# Patient Record
Sex: FEMALE | Race: BLACK OR AFRICAN AMERICAN | Employment: FULL TIME | URBAN - METROPOLITAN AREA
[De-identification: names, ages, dates, MRNs, and addresses within clinical notes are randomized per-mention and may not be internally consistent; named-entity substitution may affect disease eponyms.]

---

## 2017-06-28 ENCOUNTER — TELEPHONE (OUTPATIENT)
Dept: ORTHOPEDIC SURGERY | Age: 57
End: 2017-06-28

## 2017-06-28 ENCOUNTER — OFFICE VISIT (OUTPATIENT)
Dept: ORTHOPEDIC SURGERY | Age: 57
End: 2017-06-28

## 2017-06-28 VITALS
WEIGHT: 219.6 LBS | RESPIRATION RATE: 14 BRPM | HEIGHT: 66 IN | HEART RATE: 85 BPM | SYSTOLIC BLOOD PRESSURE: 140 MMHG | BODY MASS INDEX: 35.29 KG/M2 | DIASTOLIC BLOOD PRESSURE: 93 MMHG

## 2017-06-28 DIAGNOSIS — M54.16 LUMBAR NEURITIS: ICD-10-CM

## 2017-06-28 DIAGNOSIS — M48.061 LUMBAR SPINAL STENOSIS: Primary | ICD-10-CM

## 2017-06-28 DIAGNOSIS — M51.36 OTHER INTERVERTEBRAL DISC DEGENERATION, LUMBAR REGION: ICD-10-CM

## 2017-06-28 RX ORDER — SUMATRIPTAN 6 MG/.5ML
6 INJECTION, SOLUTION SUBCUTANEOUS AS NEEDED
COMMUNITY

## 2017-06-28 RX ORDER — DOCUSATE SODIUM 100 MG/1
100 CAPSULE, LIQUID FILLED ORAL 2 TIMES DAILY
COMMUNITY

## 2017-06-28 RX ORDER — ADAPALENE 45 G/G
GEL TOPICAL
COMMUNITY

## 2017-06-28 RX ORDER — HYDROCODONE BITARTRATE AND ACETAMINOPHEN 10; 325 MG/1; MG/1
1 TABLET ORAL
COMMUNITY
End: 2017-07-21

## 2017-06-28 RX ORDER — GABAPENTIN 300 MG/1
300 CAPSULE ORAL 3 TIMES DAILY
COMMUNITY
End: 2017-07-21

## 2017-06-28 NOTE — PROGRESS NOTES
[facility]  [address]  [address], [address]  Phone: [phone]  Fax: [phone]        INITIAL CONSULTATION      HISTORY OF PRESENT ILLNESS:  Candis Wright is a 49 y.o. female whom is self-referred secondary to lumbar/buttocks pain x 2 years with more recent radicular symptoms into the BLE (R>L) extending to the calf in the RLE and to the knee in the LLE since 4/2017. Pt reports a weakness sensation of the RLE and feels she often has to drag her RLE. No specific injury/trauma. She rates pain 6/10. Pt is a pharmacist. Pt denies h/o lumbar spinal surgery. She underwent lumbar blocks x 2 within the past year without relief as administered by Dr. Camila Ibanez at [address] [place] [address]. Pt received chiropractic treatment in the past without relief. She recently attended physical therapy which provides good relief. Pt completes her HEP regularly. Pt is followed by Dr. Suyapa Melo, neurologist in  [place], who follows her for narcolepsy, insomnia and migraines. He has essentially taken over her chronic pain management. She is currently taking Neurontin 300 mg 2 po qam and 2 po qhs. Pt took Medrol Dosepak in 5/2017 and reports completing two courses of oral steroids prior to that with significant relief. Pt denies change in bowel or bladder habits. Pt denies fever or skin changes. She reports intentional weight-loss. Lumbar spine MRI report dated 8/13/15 was reviewed with the patient. Films were not available for my review. Per report, there was multilevel disc and facet degenerative change with congenital narrowing of the lumbar canal and minimal spondylolisthesis. Severe central canal stenosis and mild right foraminal stenosis at L4-5. Mild central canal stenoses L3-4 and L5-S1. Moderate to severe bilateral foraminal stenoses at L5-S1. Pt is accompanied by her  today.  reviewed.        Past Medical History:   Diagnosis Date    Arthritis     b/l knees, hands,shoulders and feet    Chronic pain     headaches and knees    Depression     Headache     Ill-defined condition     Migraines    Other unknown and unspecified cause of morbidity or mortality     Fibromyalgia per patient          Past Surgical History:   Procedure Laterality Date    HX  SECTION      HX HEENT  1986    [place] Teeth removal    HX MOHS PROCEDURES  May 2009    right    HX ORTHOPAEDIC Right 2017    Torn meniscus repair    HX ORTHOPAEDIC Right 2014    right shoulder surgery         Social History   Substance Use Topics    Smoking status: Never Smoker    Smokeless tobacco: Never Used    Alcohol use No     Work status: The patient is employed. Marital status: The patient is . Current Outpatient Prescriptions   Medication Sig Dispense Refill    SUMAtriptan (IMITREX) 6 mg/0.5 mL injection 6 mg by SubCUTAneous route once.  gabapentin (NEURONTIN) 300 mg capsule Take 300 mg by mouth three (3) times daily.  HYDROcodone-acetaminophen (NORCO)  mg tablet Take 1 Tab by mouth.  docusate sodium (COLACE) 100 mg capsule Take 100 mg by mouth two (2) times a day.  adapalene (DIFFERIN) 0.1 % topical gel Apply  to affected area nightly. use small amount as directed      methylphenidate (RITALIN) 10 mg tablet Take 10 mg by mouth.  buPROPion XL (WELLBUTRIN XL) 300 mg XL tablet Take 300 mg by mouth every morning.  alprazolam (XANAX) 1 mg tablet Take 1 mg by mouth nightly as needed.  valacyclovir (VALTREX) 500 mg tablet Take  by mouth two (2) times a day.  naproxen (NAPROSYN) 500 mg tablet Take 500 mg by mouth two (2) times daily (with meals).  cyclobenzaprine (FLEXERIL) 10 mg tablet Take  by mouth three (3) times daily as needed.  naratriptan (AMERGE) 2.5 mg tablet Take 2.5 mg by mouth once as needed.  2.5 mg at onset of headache, may repeat in 4 hours if needed       ondansetron (ZOFRAN ODT) 8 mg disintegrating tablet Take 8 mg by mouth every eight (8) hours as needed.  simvastatin (ZOCOR) 40 mg tablet Take 1 Tab by mouth nightly. 30 Tab 2    temazepam (RESTORIL) 30 mg capsule Take  by mouth nightly as needed.  omega-3 fatty acids-vitamin e (FISH OIL) 1,000 mg Cap Take 1 Cap by mouth. Allergies   Allergen Reactions    Pcn [Penicillins] Hives, Shortness of Breath and Swelling     itch          History reviewed. No pertinent family history. REVIEW OF SYSTEMS  Constitutional symptoms: Negative  Eyes: Negative  Ears, Nose, Throat, and Mouth: Negative  Cardiovascular: Negative  Respiratory: Negative  Genitourinary: Negative  Integumentary (Skin and/or breast): Negative  Musculoskeletal: Positive for low back/buttock pain into the BLE. Extremities: Negative for edema. Endocrine/Rheumatologic: Negative  Hematologic/Lymphatic: Negative  Allergic/Immunologic: Negative  Psychiatric: Negative       PHYSICAL EXAMINATION    Visit Vitals    BP (!) 140/93    Pulse 85    Resp 14    Ht 5' 6\" (1.676 m)    Wt 219 lb 9.6 oz (99.6 kg)    BMI 35.44 kg/m2       CONSTITUTIONAL: NAD, A&O x 3  HEART: Regular rate and rhythm  ABDOMEN: Positive bowel sounds, soft, nontender, and nondistended  LUNGS: Clear to auscultation bilaterally. SKIN: No rashes noted. RANGE OF MOTION: The patient has full passive range of motion in all four extremities. SENSATION: Sensation is intact to light touch throughout. MOTOR:   Straight Leg Raise: Negative, bilateral  Hoskins: Negative, bilateral  Deep tendon reflexes are 2+ at the brachioradialis, biceps, and triceps. Deep tendon reflexes are 0 at the knees and ankles bilaterally.      Shoulder AB/Flex Elbow Flex Wrist Ext Elbow Ext Wrist Flex Hand Intrin Tone   Right +4/5 +4/5 +4/5 +4/5 +4/5 +4/5 +4/5   Left +4/5 +4/5 +4/5 +4/5 +4/5 +4/5 +4/5              Hip Flex Knee Ext Knee Flex Ankle DF GTE Ankle PF Tone   Right +4/5 +4/5 +4/5 +4/5 +4/5 +4/5 +4/5   Left +4/5 +4/5 +4/5 +4/5 +4/5 +4/5 +4/5 ASSESSMENT   Lon Teran was seen today for back pain, buttocks pain, leg pain and new patient. Diagnoses and all orders for this visit:    Lumbar spinal stenosis  -     MRI LUMB SPINE WO CONT; Future    Lumbar neuritis  -     MRI LUMB SPINE WO CONT; Future    Other intervertebral disc degeneration, lumbar region  -     MRI LUMB SPINE WO CONT; Future           IMPRESSIONS/RECOMMENDATIONS:  The patient presents for low back and buttocks pain with radicular symptoms into the BLE (R>L). I will set her up for a lumbar spine MRI. I advised patient to bring copies of films to next visit. I will have patient sign a release of medical records so I can get her block notes from Dr. Catherine Ochoa at [address] [place] [address]. I recommend she discuss with Dr. Enrique Briseno to possibly increase her Neurontin dose to 600 mg TID if tolerated. I will see the patient back following MRI. Written by Dilip Segundo, as dictated by Angelika Bran MD  I examined the patient, reviewed and agree with the note.

## 2017-06-28 NOTE — MR AVS SNAPSHOT
Visit Information Date & Time Provider Department Dept. Phone Encounter #  
 6/28/2017  3:05 PM Nakia Moctezuma MD [facility] [place] [phone] [id number] Follow-up Instructions Return for following MRI. Upcoming Health Maintenance Date Due Hepatitis C Screening 1960 DTaP/Tdap/Td series (1 - Tdap) 7/29/1983 PAP AKA CERVICAL CYTOLOGY 7/29/1983 FOBT Q 1 YEAR AGE 50-75 7/29/2012 BREAST CANCER SCRN MAMMOGRAM 8/16/2012 INFLUENZA AGE 9 TO ADULT 8/1/2017 Allergies as of 6/28/2017  Review Complete On: 6/28/2017 By: Nakia Moctezuma MD  
  
 Severity Noted Reaction Type Reactions Pcn [Penicillins]  07/27/2011    Hives, Shortness of Breath, Swelling  
 itch Current Immunizations  Never Reviewed No immunizations on file. Not reviewed this visit You Were Diagnosed With   
  
 Codes Comments Lumbar spinal stenosis    -  Primary ICD-10-CM: M48.06 
ICD-9-CM: 724.02 Lumbar neuritis     ICD-10-CM: M54.16 
ICD-9-CM: 724.4 Other intervertebral disc degeneration, lumbar region     ICD-10-CM: M51.36 
ICD-9-CM: 722.52 Vitals BP Pulse Resp Height(growth percentile) Weight(growth percentile) BMI  
 (!) 140/93 85 14 5' 6\" (1.676 m) 219 lb 9.6 oz (99.6 kg) 35.44 kg/m2 OB Status Smoking Status Having regular periods Never Smoker Vitals History BMI and BSA Data Body Mass Index Body Surface Area  
 35.44 kg/m 2 2.15 m 2 Preferred Pharmacy Pharmacy Name Phone [id number] [place], [address] [facility] 1 [phone] Your Updated Medication List  
  
   
This list is accurate as of: 6/28/17  4:43 PM.  Always use your most recent med list.  
  
  
  
  
 AMERGE 2.5 mg Tab Generic drug:  naratriptan Take 2.5 mg by mouth once as needed. 2.5 mg at onset of headache, may repeat in 4 hours if needed COLACE 100 mg capsule Generic drug:  docusate sodium Take 100 mg by mouth two (2) times a day. DIFFERIN 0.1 % topical gel Generic drug:  adapalene Apply  to affected area nightly. use small amount as directed FISH OIL 1,000 mg Cap Generic drug:  omega-3 fatty acids-vitamin e Take 1 Cap by mouth. FLEXERIL 10 mg tablet Generic drug:  cyclobenzaprine Take  by mouth three (3) times daily as needed. gabapentin 300 mg capsule Commonly known as:  NEURONTIN Take 300 mg by mouth three (3) times daily. HYDROcodone-acetaminophen  mg tablet Commonly known as:  Nicole Staple Take 1 Tab by mouth. NAPROSYN 500 mg tablet Generic drug:  naproxen Take 500 mg by mouth two (2) times daily (with meals). RITALIN 10 mg tablet Generic drug:  methylphenidate Take 10 mg by mouth. simvastatin 40 mg tablet Commonly known as:  ZOCOR Take 1 Tab by mouth nightly. SUMAtriptan 6 mg/0.5 mL injection Commonly known as:  IMITREX  
6 mg by SubCUTAneous route once. temazepam 30 mg capsule Commonly known as:  RESTORIL Take  by mouth nightly as needed. VALTREX 500 mg tablet Generic drug:  valACYclovir Take  by mouth two (2) times a day. WELLBUTRIN  mg XL tablet Generic drug:  buPROPion XL Take 300 mg by mouth every morning. XANAX 1 mg tablet Generic drug:  ALPRAZolam  
Take 1 mg by mouth nightly as needed. ZOFRAN ODT 8 mg disintegrating tablet Generic drug:  ondansetron Take 8 mg by mouth every eight (8) hours as needed. Follow-up Instructions Return for following MRI. To-Do List   
 07/05/2017 Imaging:  MRI LUMB SPINE WO CONT Referral Information Referral ID Referred By Referred To  
  
 [id number] DANYEL MCARTHUR III Not Available Visits Status Start Date End Date 1 New Request 6/28/17 6/28/18 If your referral has a status of pending review or denied, additional information will be sent to support the outcome of this decision. Introducing [facility]! [facility] introduces City Notes patient portal. Now you can access parts of your medical record, email your doctor's office, and request medication refills online. 1. In your internet browser, go to [email or web address] 2. Click on the First Time User? Click Here link in the Sign In box. You will see the New Member Sign Up page. 3. Enter your City Notes Access Code exactly as it appears below. You will not need to use this code after youve completed the sign-up process. If you do not sign up before the expiration date, you must request a new code. · City Notes Access Code: [email or web address] Expires: 9/26/2017  2:53 PM 
 
4. Enter the last four digits of your Social Security Number (xxxx) and Date of Birth (mm/dd/yyyy) as indicated and click Submit. You will be taken to the next sign-up page. 5. Create a City Notes ID. This will be your City Notes login ID and cannot be changed, so think of one that is secure and easy to remember. 6. Create a City Notes password. You can change your password at any time. 7. Enter your Password Reset Question and Answer. This can be used at a later time if you forget your password. 8. Enter your e-mail address. You will receive e-mail notification when new information is available in [address]. 9. Click Sign Up. You can now view and download portions of your medical record. 10. Click the Download Summary menu link to download a portable copy of your medical information. If you have questions, please visit the Frequently Asked Questions section of the City Notes website. Remember, City Notes is NOT to be used for urgent needs. For medical emergencies, dial 911. Now available from your iPhone and Android! Please provide this summary of care documentation to your next provider. Your primary care clinician is listed as NONE. If you have any questions after today's visit, please call [phone].

## 2017-06-29 NOTE — TELEPHONE ENCOUNTER
Called and left a message on Dr. Florentino Coronado nurse's VM (Janice) I left my direct line as a call back number.

## 2017-07-07 ENCOUNTER — DOCUMENTATION ONLY (OUTPATIENT)
Dept: ORTHOPEDIC SURGERY | Age: 57
End: 2017-07-07

## 2017-07-07 NOTE — PROGRESS NOTES
MRI Lumbar without is scheduled at [facility] on 07/19/17, arrive 12:30PM, test 1:00PM. [facility] pre-authorization [id number], effective 07/07/17-07/22/17. Patient instructed to obtain MRI disk. I have left a message for Ms Duke Hard to call letting me know she received this information.

## 2017-07-18 NOTE — TELEPHONE ENCOUNTER
Received a VM from Dr. Sharron Rebollar office that it is ok to start the patient on Neurontin 600mg. Called patient to inform her but did not get an answer.

## 2017-07-19 ENCOUNTER — HOSPITAL ENCOUNTER (OUTPATIENT)
Dept: MRI IMAGING | Age: 57
Discharge: HOME | End: 2017-07-19
Payer: COMMERCIAL

## 2017-07-19 DIAGNOSIS — M54.16 LUMBAR NEURITIS: ICD-10-CM

## 2017-07-19 DIAGNOSIS — M51.36 OTHER INTERVERTEBRAL DISC DEGENERATION, LUMBAR REGION: ICD-10-CM

## 2017-07-19 DIAGNOSIS — M48.061 LUMBAR SPINAL STENOSIS: ICD-10-CM

## 2017-07-19 PROCEDURE — 72148 MRI LUMBAR SPINE W/O DYE: CPT

## 2017-07-19 NOTE — TELEPHONE ENCOUNTER
Called patient again. VM is unidentifiable Medication has not been sent to the pharmacy d/t not being able to give the patient the proper ramp instructions. Closing message.

## 2017-07-21 ENCOUNTER — HOSPITAL ENCOUNTER (EMERGENCY)
Age: 57
Discharge: HOME | End: 2017-07-21
Attending: EMERGENCY MEDICINE
Payer: COMMERCIAL

## 2017-07-21 VITALS
HEIGHT: 66 IN | BODY MASS INDEX: 36.32 KG/M2 | WEIGHT: 226 LBS | TEMPERATURE: 98.2 F | RESPIRATION RATE: 16 BRPM | OXYGEN SATURATION: 99 % | DIASTOLIC BLOOD PRESSURE: 89 MMHG | HEART RATE: 87 BPM | SYSTOLIC BLOOD PRESSURE: 131 MMHG

## 2017-07-21 DIAGNOSIS — M48.061 LUMBAR SPINAL STENOSIS: Primary | ICD-10-CM

## 2017-07-21 DIAGNOSIS — M54.31 BILATERAL SCIATICA: ICD-10-CM

## 2017-07-21 DIAGNOSIS — M54.32 BILATERAL SCIATICA: ICD-10-CM

## 2017-07-21 DIAGNOSIS — M51.26 LUMBAR DISC HERNIATION: ICD-10-CM

## 2017-07-21 PROCEDURE — 74011250636 HC RX REV CODE- 250/636: Performed by: EMERGENCY MEDICINE

## 2017-07-21 PROCEDURE — 74011250637 HC RX REV CODE- 250/637: Performed by: EMERGENCY MEDICINE

## 2017-07-21 PROCEDURE — 96372 THER/PROPH/DIAG INJ SC/IM: CPT

## 2017-07-21 PROCEDURE — 99283 EMERGENCY DEPT VISIT LOW MDM: CPT

## 2017-07-21 RX ORDER — DIAZEPAM 5 MG/1
5 TABLET ORAL
Status: COMPLETED | OUTPATIENT
Start: 2017-07-21 | End: 2017-07-21

## 2017-07-21 RX ORDER — PREDNISONE 10 MG/1
TABLET ORAL
Qty: 48 TAB | Refills: 0 | Status: SHIPPED | OUTPATIENT
Start: 2017-07-21 | End: 2017-09-13

## 2017-07-21 RX ORDER — OXYCODONE AND ACETAMINOPHEN 5; 325 MG/1; MG/1
1-2 TABLET ORAL
Qty: 20 TAB | Refills: 0 | Status: SHIPPED | OUTPATIENT
Start: 2017-07-21 | End: 2017-09-13

## 2017-07-21 RX ORDER — HYDROMORPHONE HYDROCHLORIDE 1 MG/ML
1 INJECTION, SOLUTION INTRAMUSCULAR; INTRAVENOUS; SUBCUTANEOUS
Status: COMPLETED | OUTPATIENT
Start: 2017-07-21 | End: 2017-07-21

## 2017-07-21 RX ORDER — KETOROLAC TROMETHAMINE 30 MG/ML
60 INJECTION, SOLUTION INTRAMUSCULAR; INTRAVENOUS
Status: COMPLETED | OUTPATIENT
Start: 2017-07-21 | End: 2017-07-21

## 2017-07-21 RX ADMIN — DIAZEPAM 5 MG: 5 TABLET ORAL at 18:47

## 2017-07-21 RX ADMIN — KETOROLAC TROMETHAMINE 60 MG: 30 INJECTION, SOLUTION INTRAMUSCULAR at 18:47

## 2017-07-21 RX ADMIN — HYDROMORPHONE HYDROCHLORIDE 1 MG: 1 INJECTION, SOLUTION INTRAMUSCULAR; INTRAVENOUS; SUBCUTANEOUS at 18:47

## 2017-07-21 NOTE — ED PROVIDER NOTES
HPI Comments: 48 yo female presents to ER for evaluation of low back pain. Pain has been ongoing since April, 3 months. Pain has slowly gotten worse. Pt has had physical therapy as well as steroid injections to back. Pt taking gabapentin and hydrocodone for pain. No relief. Pain worse with movement and positional change. Pt reports that pain is located across lower back right greater than left. Pain radiates down front of left leg to mid lower leg. No new numbness/tingling of her extremities. Tingling goes to midcalf. No numbness/tingling to feet. No saddle anesthesia, no loss of bowel or bladder control, no difficulty urinating or urinary retention. No fever or chills. No dysuria, frequency or urgency. Pt reports a weakness to right leg. Pain rated 8/10. Pain described as sharp. Last medrol dose ibeth was approximately 1 month ago. Pt with MRI performed on , 2 days ago. Results show:  1. Large central-right paracentral disc herniation at L2-3 with severe canal  stenosis. 2. Mild canal stenosis at L3-4.  3. Moderate to severe canal stenosis at L4-5.  4. Mild canal, moderate to severe left foraminal, and moderate right foraminal  stenosis at L5-S1. Social hx  +  Nonsmoker  No alcohol    The history is provided by the patient. Past Medical History:   Diagnosis Date    Arthritis     b/l knees, hands,shoulders and feet    Chronic pain     headaches and knees    Depression     Headache     Ill-defined condition     Migraines    Other unknown and unspecified cause of morbidity or mortality     Fibromyalgia per patient       Past Surgical History:   Procedure Laterality Date    HX  SECTION      HX HEENT  1986    [place] Teeth removal    HX MOHS PROCEDURES  May 2009    right    HX ORTHOPAEDIC Right 2017    Torn meniscus repair    HX ORTHOPAEDIC Right 2014    right shoulder surgery         No family history on file.     Social History     Social History    Marital status:      Spouse name: N/A    Number of children: N/A    Years of education: N/A     Occupational History    Not on file. Social History Main Topics    Smoking status: Never Smoker    Smokeless tobacco: Never Used    Alcohol use No    Drug use: No    Sexual activity: Not Currently     Other Topics Concern    Not on file     Social History Narrative         ALLERGIES: Pcn [penicillins]    Review of Systems   Constitutional: Negative for fever. Respiratory: Negative for shortness of breath. Cardiovascular: Negative for chest pain. Gastrointestinal: Negative for abdominal distention, abdominal pain, diarrhea, nausea and vomiting. Genitourinary: Negative for decreased urine volume, difficulty urinating, dysuria, flank pain, frequency, hematuria, pelvic pain and urgency. Musculoskeletal: Positive for back pain. Negative for arthralgias, myalgias, neck pain and neck stiffness. Skin: Negative for color change, pallor, rash and wound. Neurological: Positive for numbness. Negative for dizziness and weakness. All other systems reviewed and are negative. Vitals:    07/21/17 1756   BP: 151/86   Pulse: (!) 102   Resp: 16   Temp: 97.8 °F (36.6 °C)   SpO2: 99%   Weight: 102.5 kg (226 lb)   Height: 5' 6\" (1.676 m)            Physical Exam   Constitutional: She is oriented to person, place, and time. She appears well-developed and well-nourished. HENT:   Head: Normocephalic and atraumatic. Eyes: Conjunctivae are normal. Pupils are equal, round, and reactive to light. Neck: Normal range of motion. Neck supple. Cardiovascular: Normal rate, regular rhythm and normal heart sounds. Pulmonary/Chest: Effort normal and breath sounds normal. She has no wheezes. She exhibits no tenderness. Abdominal: Soft. Bowel sounds are normal. She exhibits no distension and no mass. There is no tenderness. There is no rebound and no guarding.    SOFT NO AORTIC BRUITS, NO FEMORAL BRUITS,   2+ FEMORAL PULSES,   Musculoskeletal: Normal range of motion. She exhibits no edema or tenderness. NO TS SPINE PAIN WITH PALPATION. NO REDNESS, RASHES, WARMTH, NO CELLULITIS  MILD L SPINE PAIN WITH PALPATION. NO EDEMA, NO ECCHYMOSIS,   NO STEPOFFS, NO DEFORMITY. NO CVA TENDERNESS BILATERALLY  5/5 FLEXION/EXTENSION OF HIPS BILATERALLY  5/5 GREAT TOE STRENGTH BILATERALLY  2+ PATELLAR REFLEXES BILATERALLY   Neurological: She is alert and oriented to person, place, and time. She has normal reflexes. No cranial nerve deficit. She exhibits normal muscle tone. Coordination normal.   Skin: Skin is warm and dry. No rash noted. No erythema. No pallor. Psychiatric: She has a normal mood and affect. Her behavior is normal. Judgment and thought content normal.   Nursing note and vitals reviewed. MDM  Number of Diagnoses or Management Options  Bilateral sciatica:   Lumbar disc herniation:   Lumbar spinal stenosis:   Diagnosis management comments: 50 yo female with low back pain. no fever. No neuro deficits. P: review MRI, pain control. 7:23 PM  Discussed with Dr. Hunter Mcgraw on call for Dr. Maribel Wilburn. He is ok with pain medicine and another round of steroids. The patient is in overall good health. The patient denies a history of IV drug abuse, skin infections, or chronic back problems. The patient has low back pain without signs of spinal cord compression, cauda equina syndrome, infection, abscess, aneurysm, or other medically serious etiology. The patient is neurologically intact. Given the low risk of these diagnoses further testing and evaluation for these possibilities does not appear to be indicated at this time. The patient has been instructed to return if the symptoms worsen or change in any way. Standard narcotic and sedating medication warnings given  Patient's results have been reviewed with them.   Patient and/or family have verbally conveyed their understanding and agreement of the patient's signs, symptoms, diagnosis, treatment and prognosis and additionally agree to follow up as recommended or return to the Emergency Room should their condition change prior to follow-up. Discharge instructions have also been provided to the patient with some educational information regarding their diagnosis as well a list of reasons why they would want to return to the ER prior to their follow-up appointment should their condition change. Amount and/or Complexity of Data Reviewed  Discuss the patient with other providers: yes (ER attending-Jose)    Patient Progress  Patient progress: stable    ED Course       Procedures         Pt case including HPI, PE, and all available lab and radiology results has been discussed with attending physician. Opportunity to evaluate patient has been provided to ER attending. Discharge and prescription plan has been agreed upon.         \

## 2017-07-21 NOTE — DISCHARGE INSTRUCTIONS
We hope that we have addressed all of your medical concerns. The examination and treatment you received in the Emergency Department were for an emergent problem and were not intended as complete care. It is important that you follow up with your healthcare provider(s) for ongoing care. If your symptoms worsen or do not improve as expected, and you are unable to reach your usual health care provider(s), you should return to the Emergency Department. Today's healthcare is undergoing tremendous change, and patient satisfaction surveys are one of the many tools to assess the quality of medical care. You may receive a survey from the [employer] organization regarding your experience in the Emergency Department. I hope that your experience has been completely positive, particularly the medical care that I provided. As such, please participate in the survey; anything less than excellent does not meet my expectations or intentions. Thank you for allowing us to provide you with medical care today. We realize that you have many choices for your emergency care needs. Please choose us in the future for any continued health care needs. Lance Vilchis, [facility] Albert Herrmann: [phone]            No results found for this or any previous visit (from the past 24 hour(s)). No results found. Herniated Disc: Care Instructions  Your Care Instructions    The bones that form the spine in your back are cushioned by small discs. If a disc is damaged, it may bulge or break open (herniate). A herniated disc can result from normal wear and tear as we age or from an injury or disease. If a herniated disc presses on a nerve, it can cause pain and numbness in your leg (sciatica) and/or back pain. You may be able to heal your herniated disc with a few weeks or months of rest, medicine, and exercises. In some cases, you may need surgery.   Follow-up care is a key part of your treatment and safety. Be sure to make and go to all appointments, and call your doctor if you are having problems. It's also a good idea to know your test results and keep a list of the medicines you take. How can you care for yourself at home? · Take your medicines exactly as prescribed. Call your doctor if you think you are having a problem with your medicine. · Ask your doctor if you can take an over-the-counter pain medicine, such as acetaminophen (Tylenol), ibuprofen (Advil, Motrin), or naproxen (Aleve). Read and follow all instructions on the label. · Do not take two or more pain medicines at the same time unless the doctor told you to. Many pain medicines have acetaminophen, which is Tylenol. Too much acetaminophen (Tylenol) can be harmful. · Rest your back if your pain is severe. · Avoid movements and positions that increase your pain or numbness. · Try taking short walks and doing light activities that do not cause pain. Even if you are feeling some pain, it is important to keep your muscles active and strong. · Use heat or ice to relieve pain. ¨ To apply heat, put a warm water bottle, heating pad set on low, or warm cloth on your back. Do not go to sleep with a heating pad on your skin. ¨ To use ice, put ice or a cold pack on the area for 10 to 20 minutes at a time. Put a thin cloth between the ice and your skin. · Your doctor may recommend a physical therapy program, where you learn exercises to do at home. These exercises strengthen the muscles that support your lower back and prevent reinjury. · Stay at a healthy weight. This may reduce the load on your back. · Quit smoking if you smoke. If you need help quitting, talk to your doctor about stop-smoking programs and medicines. These can increase your chances of quitting for good. · To avoid hurting your back when lifting:  ¨ Lift with your legs, not your back, by squatting and bending your knees.  Avoid bending forward at the waist when lifting. ¨ Rise slowly. ¨ Keep the load as close to your body as possible, at the level of your navel. ¨ Avoid turning or twisting your body while holding a heavy object. ¨ Get help if you need to lift a heavy object. Never lift a heavy object above shoulder level. When should you call for help? Call 911 anytime you think you may need emergency care. For example, call if:  · You are unable to move a leg at all. Call your doctor now or seek immediate medical care if:  · You have new or worse symptoms in your arms, legs, chest, belly, or buttocks. Symptoms may include:  ¨ Numbness or tingling. ¨ Weakness. ¨ Pain. · You lose bladder or bowel control. Watch closely for changes in your health, and be sure to contact your doctor if:  · You are not getting better as expected. Where can you learn more? Go to http://sarai-veena.info/. Enter F534 in the search box to learn more about \"Herniated Disc: Care Instructions. \"  Current as of: March 21, 2017  Content Version: 11.3  © 3298-2264 [employer]. Care instructions adapted under license by [employer] (which disclaims liability or warranty for this information). If you have questions about a medical condition or this instruction, always ask your healthcare professional. [address] any warranty or liability for your use of this information. Sciatica: Care Instructions  Your Care Instructions    Sciatica (say \"ldr-JP-ob-kuh\") is an irritation of one of the sciatic nerves, which come from the spinal cord in the lower back. The sciatic nerves and their branches extend down through the buttock to the foot. Sciatica can develop when an injured disc in the back presses against a spinal nerve root. Its main symptom is pain, numbness, or weakness that is often worse in the leg or foot than in the back. Sciatica often will improve and go away with time.  Early treatment usually includes medicines and exercises to relieve pain. Follow-up care is a key part of your treatment and safety. Be sure to make and go to all appointments, and call your doctor if you are having problems. It's also a good idea to know your test results and keep a list of the medicines you take. How can you care for yourself at home? · Take pain medicines exactly as directed. ¨ If the doctor gave you a prescription medicine for pain, take it as prescribed. ¨ If you are not taking a prescription pain medicine, ask your doctor if you can take an over-the-counter medicine. · Use heat or ice to relieve pain. ¨ To apply heat, put a warm water bottle, heating pad set on low, or warm cloth on your back. Do not go to sleep with a heating pad on your skin. ¨ To use ice, put ice or a cold pack on the area for 10 to 20 minutes at a time. Put a thin cloth between the ice and your skin. · Avoid sitting if possible, unless it feels better than standing. · Alternate lying down with short walks. Increase your walking distance as you are able to without making your symptoms worse. · Do not do anything that makes your symptoms worse. When should you call for help? Call 911 anytime you think you may need emergency care. For example, call if:  · You are unable to move a leg at all. Call your doctor now or seek immediate medical care if:  · You have new or worse symptoms in your legs or buttocks. Symptoms may include:  ¨ Numbness or tingling. ¨ Weakness. ¨ Pain. · You lose bladder or bowel control. Watch closely for changes in your health, and be sure to contact your doctor if:  · You are not getting better as expected. Where can you learn more? Go to http://sarai-veena.info/. Enter [phone] in the search box to learn more about \"Sciatica: Care Instructions. \"  Current as of: March 21, 2017  Content Version: 11.3  © 9827-6536 [employer].  Care instructions adapted under license by [employer] (which disclaims liability or warranty for this information). If you have questions about a medical condition or this instruction, always ask your healthcare professional. [address] any warranty or liability for your use of this information.

## 2017-07-21 NOTE — ED TRIAGE NOTES
Pt states that she has had pain to her lower back into both butt cheeks and into her front and back right leg down to her calf. Pt states that she has pain to both sides of her lower abd and little strength in her right leg with poor balance and falls recently. Pt states that this has been going on since April but the pain has become more severe in the past month. Pt had an MRI done recently.

## 2017-07-24 ENCOUNTER — DOCUMENTATION ONLY (OUTPATIENT)
Dept: ORTHOPEDIC SURGERY | Age: 57
End: 2017-07-24

## 2017-07-28 ENCOUNTER — OFFICE VISIT (OUTPATIENT)
Dept: ORTHOPEDIC SURGERY | Age: 57
End: 2017-07-28

## 2017-07-28 VITALS
SYSTOLIC BLOOD PRESSURE: 139 MMHG | DIASTOLIC BLOOD PRESSURE: 95 MMHG | WEIGHT: 223 LBS | HEIGHT: 66 IN | HEART RATE: 101 BPM | BODY MASS INDEX: 35.84 KG/M2

## 2017-07-28 DIAGNOSIS — M51.26 LUMBAR HERNIATED DISC: Primary | ICD-10-CM

## 2017-07-28 DIAGNOSIS — M51.36 OTHER INTERVERTEBRAL DISC DEGENERATION, LUMBAR REGION: ICD-10-CM

## 2017-07-28 DIAGNOSIS — M54.16 RADICULOPATHY, LUMBAR REGION: ICD-10-CM

## 2017-07-28 DIAGNOSIS — M48.061 LUMBAR SPINAL STENOSIS: ICD-10-CM

## 2017-07-28 NOTE — PROGRESS NOTES
[facility]  [address], [address]  Phone: [phone]  Fax: [phone]        PROGRESS NOTE      HISTORY OF PRESENT ILLNESS:  The patient is a 49 y.o. female and was seen today for follow up of lumbar/buttocks pain x 2 years with more recent radicular symptoms into the BLE (R>L) extending to the calf in the RLE and to the knee in the LLE since 4/2017. Pt reports a weakness sensation of the RLE and feels she often has to drag her RLE. No specific injury/trauma. Pt is a pharmacist. Pt denies h/o lumbar spinal surgery. She underwent lumbar blocks x 1 within the past 12 months (possible in August of 2016) without relief as administered by Dr. Darci Valle at [place], [place]. Pt received chiropractic treatment in the past without relief. She recently attended physical therapy which provided good relief. Pt completes her HEP regularly. Pt is followed by Dr. Zeus Gayel, neurolog[place] in [place], who follows her for narcolepsy, insomnia and migraines. He has essentially taken over her chronic pain management. She is currently taking Neurontin 300 mg 2 po qam and 2 po qhs. Pt took Medrol Dosepak in 5/2017 and reports completing two courses of oral steroids prior to that with significant relief. Pt denies change in bowel or bladder habits. Pt denies fever or skin changes. She reports intentional weight-loss. Lumbar spine MRI report dated 8/13/15 was reviewed with the patient. Films were not available for my review. Per report, there was multilevel disc and facet degenerative change with congenital narrowing of the lumbar canal and minimal spondylolisthesis. Severe central canal stenosis and mild right foraminal stenosis at L4-5. Mild central canal stenoses L3-4 and L5-S1. Moderate to severe bilateral foraminal stenoses at L5-S1. At her last clinical appointment, the patient presented for low back and buttocks pain with radicular symptoms into the BLE (R>L).  I set her up for a lumbar spine MRI. I had patient sign a release of medical records so I could get her block notes from Dr. Nakita Bhandari at [facility]. I recommended she discuss with Dr. John Chahal to possibly increase her Neurontin dose to 600 mg TID if tolerated. The patient returns today with pain location and distribution remain unchanged. Pt called our office on 7/21/17 reporting several falls and a weakness sensation of the BLE. She rates pain 3-6/10, a slight improvement since her last visit (6/10). Pt is accompanied by her  today. She is tolerating increased Neurontin 600 mg TID with slight benefit. ER note from Seema Gamoba PA-C dated 7/21/17 ndicating patient presented for low back pain into the LLE. Her pain was rated to be an 8/10 at that time. At that time, she was prescribed Prednisone and Percocet which provide significant relief. Lumbar spine MRI dated 7/19/18 reviewed. Per report, large cental-right paracentral disc herniation at L2-3 with severe canal stenosis. Mild canal stenosis at L3-4. Moderate to severe canal stenosis at L4-5. Mild, canal, moderate to severe left foraminal, and moderate right foraminal stenosis at L5-S1. Gisela Fischer  reviewed. Past Medical History:   Diagnosis Date    Arthritis     b/l knees, hands,shoulders and feet    Chronic pain     headaches and knees    Depression     Headache     Ill-defined condition     Migraines    Other unknown and unspecified cause of morbidity or mortality     Fibromyalgia per patient        Social History     Social History    Marital status:      Spouse name: N/A    Number of children: N/A    Years of education: N/A     Occupational History    Not on file.      Social History Main Topics    Smoking status: Never Smoker    Smokeless tobacco: Never Used    Alcohol use No    Drug use: No    Sexual activity: Not Currently     Other Topics Concern    Not on file     Social History Narrative       Current Outpatient Prescriptions   Medication Sig Dispense Refill    predniSONE (STERAPRED DS) 10 mg dose pack Take as directed on package 48 Tab 0    oxyCODONE-acetaminophen (PERCOCET) 5-325 mg per tablet Take 1-2 Tabs by mouth every six (6) hours as needed for Pain. Max Daily Amount: 8 Tabs. 20 Tab 0    gabapentin (NEURONTIN) 600 mg tablet Take 1 Tab by mouth three (3) times daily. 90 Tab 1    SUMAtriptan (IMITREX) 6 mg/0.5 mL injection 6 mg by SubCUTAneous route once.  docusate sodium (COLACE) 100 mg capsule Take 100 mg by mouth two (2) times a day.  adapalene (DIFFERIN) 0.1 % topical gel Apply  to affected area nightly. use small amount as directed      methylphenidate (RITALIN) 10 mg tablet Take 10 mg by mouth.  buPROPion XL (WELLBUTRIN XL) 300 mg XL tablet Take 300 mg by mouth every morning.  alprazolam (XANAX) 1 mg tablet Take 1 mg by mouth nightly as needed.  valacyclovir (VALTREX) 500 mg tablet Take  by mouth two (2) times a day.  naproxen (NAPROSYN) 500 mg tablet Take 500 mg by mouth two (2) times daily (with meals).  cyclobenzaprine (FLEXERIL) 10 mg tablet Take  by mouth three (3) times daily as needed.  naratriptan (AMERGE) 2.5 mg tablet Take 2.5 mg by mouth once as needed. 2.5 mg at onset of headache, may repeat in 4 hours if needed       ondansetron (ZOFRAN ODT) 8 mg disintegrating tablet Take 8 mg by mouth every eight (8) hours as needed.  simvastatin (ZOCOR) 40 mg tablet Take 1 Tab by mouth nightly. (Patient not taking: Reported on 7/28/2017) 30 Tab 2    temazepam (RESTORIL) 30 mg capsule Take  by mouth nightly as needed.  omega-3 fatty acids-vitamin e (FISH OIL) 1,000 mg Cap Take 1 Cap by mouth.          Allergies   Allergen Reactions    Pcn [Penicillins] Hives, Shortness of Breath and Swelling     itch          PHYSICAL EXAMINATION    Visit Vitals    BP (!) 139/95    Pulse (!) 101    Ht 5' 6\" (1.676 m)    Wt 223 lb (101.2 kg)    BMI 35.99 kg/m2 CONSTITUTIONAL: NAD, A&O x 3  SENSATION: Decreased sensation to light touch at left anterior thigh. Sensation to light touch otherwise intact. RANGE OF MOTION: The patient has full passive range of motion in all four extremities. MOTOR:  Straight Leg Raise: Negative, bilateral               Hip Flex Knee Ext Knee Flex Ankle DF GTE Ankle PF Tone   Right +4/5 +4/5 +4/5 +4/5 +4/5 +4/5 +4/5   Left +4/5 +4/5 +4/5 +4/5 +4/5 +4/5 +4/5       ASSESSMENT   Diagnoses and all orders for this visit:    1. Lumbar herniated disc    2. Lumbar spinal stenosis    3. Radiculopathy, lumbar region    4. Other intervertebral disc degeneration, lumbar region          IMPRESSION AND PLAN:  There is a large paracentral disc herniation at L3-4 with severe central canal stenosis along with moderate to severe central canal stenosis at L4-5 to account for her pain complaints. Patient is neurologically intact  There were no apparent signs of BLE weakness upon physical examination. Patient is not yet interested in considering surgical intervention or lumbar blocks at this time. She should continue on Neurontin 600 mg TID as it is too early to increase the dose at this point. I will see the patient back in 1 month's time or earlier if needed. Written by Messi Granda, as dictated by Enrique Calix MD  I examined the patient, reviewed and agree with the note.

## 2017-07-28 NOTE — MR AVS SNAPSHOT
Visit Information Date & Time Provider Department Dept. Phone Encounter #  
 7/28/2017  3:15 PM Javi Pitts MD [address] [place] [address], [address] [facility] [phone] [id number] Follow-up Instructions Return in about 4 weeks (around 8/25/2017). Upcoming Health Maintenance Date Due Hepatitis C Screening 1960 DTaP/Tdap/Td series (1 - Tdap) 7/29/1983 PAP AKA CERVICAL CYTOLOGY 7/29/1983 FOBT Q 1 YEAR AGE 50-75 7/29/2012 BREAST CANCER SCRN MAMMOGRAM 8/16/2012 INFLUENZA AGE 9 TO ADULT 8/1/2017 Allergies as of 7/28/2017  Review Complete On: 7/28/2017 By: Javi Pitts MD  
  
 Severity Noted Reaction Type Reactions Pcn [Penicillins]  07/27/2011    Hives, Shortness of Breath, Swelling  
 itch Current Immunizations  Never Reviewed No immunizations on file. Not reviewed this visit You Were Diagnosed With   
  
 Codes Comments Lumbar herniated disc    -  Primary ICD-10-CM: M51.26 
ICD-9-CM: 722.10 Lumbar spinal stenosis     ICD-10-CM: M48.06 
ICD-9-CM: 724.02 Radiculopathy, lumbar region     ICD-10-CM: M54.16 
ICD-9-CM: 724.4 Other intervertebral disc degeneration, lumbar region     ICD-10-CM: M51.36 
ICD-9-CM: 722.52 Vitals BP Pulse Height(growth percentile) Weight(growth percentile) BMI OB Status (!) 139/95 (!) 101 5' 6\" (1.676 m) 223 lb (101.2 kg) 35.99 kg/m2 Having regular periods Smoking Status Never Smoker Vitals History BMI and BSA Data Body Mass Index Body Surface Area 35.99 kg/m 2 2.17 m 2 Preferred Pharmacy Pharmacy Name Phone [id number] [place] [address] [address] [phone] Your Updated Medication List  
  
   
This list is accurate as of: 7/28/17  3:42 PM.  Always use your most recent med list.  
  
  
  
  
 AMERGE 2.5 mg Tab Generic drug:  naratriptan Take 2.5 mg by mouth once as needed. 2.5 mg at onset of headache, may repeat in 4 hours if needed COLACE 100 mg capsule Generic drug:  docusate sodium Take 100 mg by mouth two (2) times a day. DIFFERIN 0.1 % topical gel Generic drug:  adapalene Apply  to affected area nightly. use small amount as directed FISH OIL 1,000 mg Cap Generic drug:  omega-3 fatty acids-vitamin e Take 1 Cap by mouth. FLEXERIL 10 mg tablet Generic drug:  cyclobenzaprine Take  by mouth three (3) times daily as needed. gabapentin 600 mg tablet Commonly known as:  NEURONTIN Take 1 Tab by mouth three (3) times daily. NAPROSYN 500 mg tablet Generic drug:  naproxen Take 500 mg by mouth two (2) times daily (with meals). oxyCODONE-acetaminophen 5-325 mg per tablet Commonly known as:  PERCOCET Take 1-2 Tabs by mouth every six (6) hours as needed for Pain. Max Daily Amount: 8 Tabs. predniSONE 10 mg dose pack Commonly known as:  STERAPRED DS Take as directed on package RITALIN 10 mg tablet Generic drug:  methylphenidate Take 10 mg by mouth. simvastatin 40 mg tablet Commonly known as:  ZOCOR Take 1 Tab by mouth nightly. SUMAtriptan 6 mg/0.5 mL injection Commonly known as:  IMITREX  
6 mg by SubCUTAneous route once. temazepam 30 mg capsule Commonly known as:  RESTORIL Take  by mouth nightly as needed. VALTREX 500 mg tablet Generic drug:  valACYclovir Take  by mouth two (2) times a day. WELLBUTRIN  mg XL tablet Generic drug:  buPROPion XL Take 300 mg by mouth every morning. XANAX 1 mg tablet Generic drug:  ALPRAZolam  
Take 1 mg by mouth nightly as needed. ZOFRAN ODT 8 mg disintegrating tablet Generic drug:  ondansetron Take 8 mg by mouth every eight (8) hours as needed. Follow-up Instructions Return in about 4 weeks (around 8/25/2017). Introducing [facility]! Rachna gabriel Re[facility] patient portal. Now you can access parts of your medical record, email your doctor's office, and request medication refills online. 1. In your internet browser, go to [email or web address] 2. Click on the First Time User? Click Here link in the Sign In box. You will see the New Member Sign Up page. 3. Enter your Reppler Access Code exactly as it appears below. You will not need to use this code after youve completed the sign-up process. If you do not sign up before the expiration date, you must request a new code. · Reppler Access Code: [email or web address] Expires: 9/26/2017  2:53 PM 
 
4. Enter the last four digits of your Social Security Number (xxxx) and Date of Birth (mm/dd/yyyy) as indicated and click Submit. You will be taken to the next sign-up page. 5. Create a Reppler ID. This will be your Reppler login ID and cannot be changed, so think of one that is secure and easy to remember. 6. Create a Reppler password. You can change your password at any time. 7. Enter your Password Reset Question and Answer. This can be used at a later time if you forget your password. 8. Enter your e-mail address. You will receive e-mail notification when new information is available in [address] 9. Click Sign Up. You can now view and download portions of your medical record. 10. Click the Download Summary menu link to download a portable copy of your medical information. If you have questions, please visit the Frequently Asked Questions section of the Reppler website. Remember, Reppler is NOT to be used for urgent needs. For medical emergencies, dial 911. Now available from your iPhone and Android! Please provide this summary of care documentation to your next provider. Your primary care clinician is listed as BARBIE Butler. If you have any questions after today's visit, please call [phone].

## 2017-08-24 ENCOUNTER — TELEPHONE (OUTPATIENT)
Dept: ORTHOPEDIC SURGERY | Age: 57
End: 2017-08-24

## 2017-08-24 NOTE — TELEPHONE ENCOUNTER
According to the last OV note in July per Dr Estee Simmons:   Patient is not yet interested in considering surgical intervention or lumbar blocks at this time.     She can try her PCP

## 2017-08-24 NOTE — TELEPHONE ENCOUNTER
Patient called to request a letter from Dr. Patricio Stone to excuse her from jury duty that starts 9/11 and ends 11/10/17 due to possible upcoming back surgery. Please advise patient at [phone] ( please leave msg if voicemail ) once prepared, she has an 8/28 deadline.

## 2017-08-24 NOTE — TELEPHONE ENCOUNTER
Dr. Mellie Bernheim stated in his last OV not on 07/28 that she was not interested in spinal injections or surgery. She has a follow-up on 09/13 with Dr. Mellie Bernheim. We can write her note to miss jury duty for her appointment.  Otherwise, address this with Dr. Mellie Bernheim

## 2017-09-13 ENCOUNTER — HOSPITAL ENCOUNTER (EMERGENCY)
Age: 57
Discharge: HOME | End: 2017-09-13
Attending: FAMILY MEDICINE

## 2017-09-13 ENCOUNTER — APPOINTMENT (OUTPATIENT)
Dept: GENERAL RADIOLOGY | Age: 57
End: 2017-09-13
Attending: PHYSICIAN ASSISTANT

## 2017-09-13 VITALS
BODY MASS INDEX: 36.19 KG/M2 | TEMPERATURE: 98.9 F | RESPIRATION RATE: 20 BRPM | HEART RATE: 110 BPM | HEIGHT: 66 IN | SYSTOLIC BLOOD PRESSURE: 144 MMHG | DIASTOLIC BLOOD PRESSURE: 92 MMHG | OXYGEN SATURATION: 100 % | WEIGHT: 225.2 LBS

## 2017-09-13 DIAGNOSIS — M54.2 NECK PAIN: ICD-10-CM
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
## 2017-09-13 RX ORDER — CYCLOBENZAPRINE HCL 10 MG
10 TABLET ORAL
Qty: 30 TAB | Refills: 0 | Status: ON HOLD | OUTPATIENT
Start: 2017-09-13 | End: 2017-09-25

## 2017-09-13 RX ORDER — HYDROCODONE BITARTRATE AND ACETAMINOPHEN 7.5; 325 MG/1; MG/1
1 TABLET ORAL
Qty: 20 TAB | Refills: 0 | Status: SHIPPED | OUTPATIENT
Start: 2017-09-13 | End: 2017-09-20
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
## 2017-09-13 NOTE — LETTER
[address] [place] [address] 
[phone] Work/School Note Date: 9/13/2017 To Whom It May concern: 
 
Glo Saravia was seen and treated today in the emergency room by the following provider(s): 
Attending Provider: Kathy Huntley MD 
Physician Assistant: Venkata Bartlett. Glo Saravia may return to work on 09/15/17. Sincerely, Venkata Bartlett
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
## 2017-09-20 ENCOUNTER — HOSPITAL ENCOUNTER (OUTPATIENT)
Dept: GENERAL RADIOLOGY | Age: 57
Discharge: HOME | End: 2017-09-20
Attending: ORTHOPAEDIC SURGERY
Payer: COMMERCIAL

## 2017-09-20 ENCOUNTER — HOSPITAL ENCOUNTER (OUTPATIENT)
Dept: PREADMISSION TESTING | Age: 57
Discharge: HOME | End: 2017-09-20
Attending: ORTHOPAEDIC SURGERY
Payer: COMMERCIAL

## 2017-09-20 VITALS
HEART RATE: 107 BPM | WEIGHT: 229.28 LBS | RESPIRATION RATE: 20 BRPM | TEMPERATURE: 99 F | BODY MASS INDEX: 36.85 KG/M2 | OXYGEN SATURATION: 96 % | SYSTOLIC BLOOD PRESSURE: 146 MMHG | DIASTOLIC BLOOD PRESSURE: 90 MMHG | HEIGHT: 66 IN

## 2017-09-20 LAB
25(OH)D3 SERPL-MCNC: 13.7 NG/ML (ref 30–100)
ALBUMIN SERPL-MCNC: 3.6 G/DL (ref 3.5–5)
ALBUMIN/GLOB SERPL: 0.9 {RATIO} (ref 1.1–2.2)
ALP SERPL-CCNC: 76 U/L (ref 45–117)
ALT SERPL-CCNC: 18 U/L (ref 12–78)
ANION GAP SERPL CALC-SCNC: 5 MMOL/L (ref 5–15)
APPEARANCE UR: CLEAR
AST SERPL-CCNC: 12 U/L (ref 15–37)
ATRIAL RATE: 92 BPM
BACTERIA URNS QL MICRO: ABNORMAL /HPF
BILIRUB SERPL-MCNC: 0.4 MG/DL (ref 0.2–1)
BILIRUB UR QL: NEGATIVE
BUN SERPL-MCNC: 15 MG/DL (ref 6–20)
BUN/CREAT SERPL: 19 (ref 12–20)
CALCIUM SERPL-MCNC: 9 MG/DL (ref 8.5–10.1)
CALCULATED P AXIS, ECG09: 58 DEGREES
CALCULATED R AXIS, ECG10: -4 DEGREES
CALCULATED T AXIS, ECG11: 16 DEGREES
CHLORIDE SERPL-SCNC: 102 MMOL/L (ref 97–108)
CO2 SERPL-SCNC: 31 MMOL/L (ref 21–32)
COLOR UR: ABNORMAL
CREAT SERPL-MCNC: 0.81 MG/DL (ref 0.55–1.02)
DIAGNOSIS, 93000: NORMAL
EPITH CASTS URNS QL MICRO: ABNORMAL /LPF
ERYTHROCYTE [DISTWIDTH] IN BLOOD BY AUTOMATED COUNT: 15.7 % (ref 11.5–14.5)
EST. AVERAGE GLUCOSE BLD GHB EST-MCNC: 120 MG/DL
GLOBULIN SER CALC-MCNC: 4 G/DL (ref 2–4)
GLUCOSE SERPL-MCNC: 80 MG/DL (ref 65–100)
GLUCOSE UR STRIP.AUTO-MCNC: NEGATIVE MG/DL
HBA1C MFR BLD: 5.8 % (ref 4.2–6.3)
HCT VFR BLD AUTO: 42.8 % (ref 35–47)
HGB BLD-MCNC: 14 G/DL (ref 11.5–16)
HGB UR QL STRIP: ABNORMAL
HYALINE CASTS URNS QL MICRO: ABNORMAL /LPF (ref 0–5)
INR PPP: 1 (ref 0.9–1.1)
KETONES UR QL STRIP.AUTO: NEGATIVE MG/DL
LEUKOCYTE ESTERASE UR QL STRIP.AUTO: NEGATIVE
MCH RBC QN AUTO: 28.2 PG (ref 26–34)
MCHC RBC AUTO-ENTMCNC: 32.7 G/DL (ref 30–36.5)
MCV RBC AUTO: 86.1 FL (ref 80–99)
NITRITE UR QL STRIP.AUTO: NEGATIVE
P-R INTERVAL, ECG05: 138 MS
PH UR STRIP: 6 [PH] (ref 5–8)
PLATELET # BLD AUTO: 229 K/UL (ref 150–400)
POTASSIUM SERPL-SCNC: 3.7 MMOL/L (ref 3.5–5.1)
PREALB SERPL-MCNC: 29.1 MG/DL (ref 20–40)
PROT SERPL-MCNC: 7.6 G/DL (ref 6.4–8.2)
PROT UR STRIP-MCNC: NEGATIVE MG/DL
PROTHROMBIN TIME: 10.5 SEC (ref 9–11.1)
Q-T INTERVAL, ECG07: 344 MS
QRS DURATION, ECG06: 84 MS
QTC CALCULATION (BEZET), ECG08: 425 MS
RBC # BLD AUTO: 4.97 M/UL (ref 3.8–5.2)
RBC #/AREA URNS HPF: ABNORMAL /HPF (ref 0–5)
SODIUM SERPL-SCNC: 138 MMOL/L (ref 136–145)
SP GR UR REFRACTOMETRY: 1.02 (ref 1–1.03)
UA: UC IF INDICATED,UAUC: ABNORMAL
UROBILINOGEN UR QL STRIP.AUTO: 0.2 EU/DL (ref 0.2–1)
VENTRICULAR RATE, ECG03: 92 BPM
WBC # BLD AUTO: 5.5 K/UL (ref 3.6–11)
WBC URNS QL MICRO: ABNORMAL /HPF (ref 0–4)

## 2017-09-20 PROCEDURE — 83036 HEMOGLOBIN GLYCOSYLATED A1C: CPT | Performed by: ORTHOPAEDIC SURGERY

## 2017-09-20 PROCEDURE — 85027 COMPLETE CBC AUTOMATED: CPT | Performed by: ORTHOPAEDIC SURGERY

## 2017-09-20 PROCEDURE — 87086 URINE CULTURE/COLONY COUNT: CPT | Performed by: ORTHOPAEDIC SURGERY

## 2017-09-20 PROCEDURE — 80053 COMPREHEN METABOLIC PANEL: CPT | Performed by: ORTHOPAEDIC SURGERY

## 2017-09-20 PROCEDURE — 71020 XR CHEST PA LAT: CPT

## 2017-09-20 PROCEDURE — 86900 BLOOD TYPING SEROLOGIC ABO: CPT | Performed by: ORTHOPAEDIC SURGERY

## 2017-09-20 PROCEDURE — 85610 PROTHROMBIN TIME: CPT | Performed by: ORTHOPAEDIC SURGERY

## 2017-09-20 PROCEDURE — 84134 ASSAY OF PREALBUMIN: CPT | Performed by: ORTHOPAEDIC SURGERY

## 2017-09-20 PROCEDURE — 81001 URINALYSIS AUTO W/SCOPE: CPT | Performed by: ORTHOPAEDIC SURGERY

## 2017-09-20 PROCEDURE — 93005 ELECTROCARDIOGRAM TRACING: CPT

## 2017-09-20 PROCEDURE — 82306 VITAMIN D 25 HYDROXY: CPT | Performed by: ORTHOPAEDIC SURGERY

## 2017-09-20 PROCEDURE — 36415 COLL VENOUS BLD VENIPUNCTURE: CPT | Performed by: ORTHOPAEDIC SURGERY

## 2017-09-20 RX ORDER — ACETAMINOPHEN 500 MG
1000 TABLET ORAL ONCE
Status: CANCELLED | OUTPATIENT
Start: 2017-09-25 | End: 2017-09-25

## 2017-09-20 RX ORDER — METHYLPHENIDATE HYDROCHLORIDE 40 MG/1
40 CAPSULE, EXTENDED RELEASE ORAL
COMMUNITY

## 2017-09-20 RX ORDER — LEVOFLOXACIN 5 MG/ML
500 INJECTION, SOLUTION INTRAVENOUS ONCE
Status: CANCELLED | OUTPATIENT
Start: 2017-09-25 | End: 2017-09-25

## 2017-09-20 RX ORDER — PREGABALIN 150 MG/1
150 CAPSULE ORAL ONCE
Status: CANCELLED | OUTPATIENT
Start: 2017-09-25 | End: 2017-09-25

## 2017-09-20 RX ORDER — HYDROCODONE BITARTRATE AND ACETAMINOPHEN 10; 325 MG/1; MG/1
1 TABLET ORAL AS NEEDED
COMMUNITY

## 2017-09-20 RX ORDER — VANCOMYCIN/0.9 % SOD CHLORIDE 1.5G/250ML
1500 PLASTIC BAG, INJECTION (ML) INTRAVENOUS ONCE
Status: CANCELLED | OUTPATIENT
Start: 2017-09-25 | End: 2017-09-25

## 2017-09-20 RX ORDER — MELATONIN
1000 DAILY
COMMUNITY
End: 2017-09-22

## 2017-09-20 RX ORDER — SODIUM CHLORIDE, SODIUM LACTATE, POTASSIUM CHLORIDE, CALCIUM CHLORIDE 600; 310; 30; 20 MG/100ML; MG/100ML; MG/100ML; MG/100ML
25 INJECTION, SOLUTION INTRAVENOUS CONTINUOUS
Status: CANCELLED | OUTPATIENT
Start: 2017-09-25

## 2017-09-20 NOTE — PERIOP NOTES
[facility]  Preoperative Instructions        Surgery Date 09/25/17          Time of Arrival 1030  Contact # [phone] cell    1. On the day of your surgery, please report to the Surgical Services Registration Desk and sign in at your designated time. The Surgery Center is located to the right of the Emergency Room. 2. You must have someone with you to drive you home. You should not drive a car for 24 hours following surgery. Please make arrangements for a friend or family member to stay with you for the first 24 hours after your surgery. 3. Do not have anything to eat or drink (including water, gum, mints, coffee, juice) after midnight ?09/24/17  . ? This may not apply to medications prescribed by your physician. ?(Please note below the special instructions with medications to take the morning of your procedure.)    4. We recommend you do not drink any alcoholic beverages for 24 hours before and after your surgery. 5. Contact your surgeons office for instructions on the following medications: non-steroidal anti-inflammatory drugs (i.e. Advil, Aleve), vitamins, and supplements. (Some surgeons will want you to stop these medications prior to surgery and others may allow you to take them)  **If you are currently taking Plavix, Coumadin, Aspirin and/or other blood-thinning agents, contact your surgeon for instructions. ** Your surgeon will partner with the physician prescribing these medications to determine if it is safe to stop or if you need to continue taking. Please do not stop taking these medications without instructions from your surgeon    6. Wear comfortable clothes. Wear glasses instead of contacts. Do not bring any money or jewelry. Please bring picture ID, insurance card, and any prearranged co-payment or hospital payment. Do not wear make-up, particularly mascara the morning of your surgery.   Do not wear nail polish, particularly if you are having foot /hand surgery. Wear your hair loose or down, no ponytails, buns, alma pins or clips. All body piercings must be removed. Please shower with antibacterial soap for three consecutive days before and on the morning of surgery, but do not apply any lotions, powders or deodorants after the shower on the day of surgery. Please use a fresh towels after each shower. Please sleep in clean clothes and change bed linens the night before surgery. Please do not shave for 48 hours prior to surgery. Shaving of the face is acceptable. 7. You should understand that if you do not follow these instructions your surgery may be cancelled. If your physical condition changes (I.e. fever, cold or flu) please contact your surgeon as soon as possible. 8. It is important that you be on time. If a situation occurs where you may be late, please call [phone] (OR Holding Area). 9. If you have any questions and or problems, please call [phone] (Pre-admission Testing). 10. Your surgery time may be subject to change. You will receive a phone call the evening prior if your time changes. 11.  If having outpatient surgery, you must have someone to drive you here, stay with you during the duration of your stay, and to drive you home at time of discharge. 12.   In an effort to improve the efficiency, privacy, and safety for all of our Pre-op patients visitors are not allowed in the Holding area. Once you arrive and are registered your family/visitors will be asked to remain in the waiting room. The Pre-op staff will get you from the Surgical Waiting Area and will explain to you and your family/visitors that the Pre-op phase is beginning. The staff will answer any questions and provide instructions for tracking of the patient, by use of the existing tracking number and color-coded status board in the waiting room.   At this time the staff will also ask for your designated spokesperson information in the event that the physician or staff need to provide an update or obtain any pertinent information. The designated spokesperson will be notified if the physician needs to speak to family during the pre-operative phase. If at any time your family/visitors has questions or concerns they may approach the volunteer desk in the waiting area for assistance. Special Instructions: Follow instructions for hibiclens/antibacterial soap given with pre-op appointment    MEDICATIONS TO TAKE THE MORNING OF SURGERY WITH A SIP OF WATER:wellbutrin,xanax,flexeril as needed,gabapentin,hydrocodone as needed--migraine medication as needed--zofran as needed      I understand a pre-operative phone call will be made to verify my surgery time. In the event that I am not available, I give permission for a message to be left on my answering service and/or with another person?   yes          ___________________      __________   _________    (Signature of Patient)             (Witness)                (Date and Time)

## 2017-09-20 NOTE — ADVANCED PRACTICE NURSE
ESTEVAN score of 3 in PAT. Pt denies snoring or having been advised that she has periods of apnea while sleeping. Has had 2 prior sleep studies for sleep disorder (\"increased daytime somnolence\") but has not been advised that she requires treatment for sleep apnea. Has no decreased cervical ROM, no removable or loose teeth.

## 2017-09-21 LAB
ABO + RH BLD: NORMAL
BACTERIA SPEC CULT: NORMAL
BACTERIA SPEC CULT: NORMAL
BLOOD GROUP ANTIBODIES SERPL: NORMAL
SERVICE CMNT-IMP: NORMAL
SPECIMEN EXP DATE BLD: NORMAL

## 2017-09-22 LAB
BACTERIA SPEC CULT: NORMAL
CC UR VC: NORMAL
SERVICE CMNT-IMP: NORMAL

## 2017-09-22 RX ORDER — CHOLECALCIFEROL (VITAMIN D3) 125 MCG
1 CAPSULE ORAL DAILY
COMMUNITY

## 2017-09-25 ENCOUNTER — APPOINTMENT (OUTPATIENT)
Dept: GENERAL RADIOLOGY | Age: 57
DRG: 460 | End: 2017-09-25
Attending: ORTHOPAEDIC SURGERY
Payer: COMMERCIAL

## 2017-09-25 ENCOUNTER — ANESTHESIA (OUTPATIENT)
Dept: SURGERY | Age: 57
DRG: 460 | End: 2017-09-25
Payer: COMMERCIAL

## 2017-09-25 ENCOUNTER — HOSPITAL ENCOUNTER (INPATIENT)
Age: 57
LOS: 2 days | Discharge: HOME HEALTH | DRG: 460 | End: 2017-09-27
Attending: ORTHOPAEDIC SURGERY | Admitting: ORTHOPAEDIC SURGERY
Payer: COMMERCIAL

## 2017-09-25 ENCOUNTER — ANESTHESIA EVENT (OUTPATIENT)
Dept: SURGERY | Age: 57
DRG: 460 | End: 2017-09-25
Payer: COMMERCIAL

## 2017-09-25 PROBLEM — M48.062 SPINAL STENOSIS OF LUMBAR REGION WITH NEUROGENIC CLAUDICATION: Status: ACTIVE | Noted: 2017-09-25

## 2017-09-25 LAB — HCG UR QL: NEGATIVE

## 2017-09-25 PROCEDURE — 77030035129: Performed by: ORTHOPAEDIC SURGERY

## 2017-09-25 PROCEDURE — 74011000250 HC RX REV CODE- 250

## 2017-09-25 PROCEDURE — 76001 XR FLUOROSCOPY OVER 60 MINUTES: CPT

## 2017-09-25 PROCEDURE — 77030029099 HC BN WAX SSPC -A: Performed by: ORTHOPAEDIC SURGERY

## 2017-09-25 PROCEDURE — 74011000250 HC RX REV CODE- 250: Performed by: ORTHOPAEDIC SURGERY

## 2017-09-25 PROCEDURE — 77030016570 HC BLNKT BAIR HGGR 3M -B: Performed by: ORTHOPAEDIC SURGERY

## 2017-09-25 PROCEDURE — 0SG10AJ FUSION OF 2 OR MORE LUMBAR VERTEBRAL JOINTS WITH INTERBODY FUSION DEVICE, POSTERIOR APPROACH, ANTERIOR COLUMN, OPEN APPROACH: ICD-10-PCS | Performed by: ORTHOPAEDIC SURGERY

## 2017-09-25 PROCEDURE — 77030008467 HC STPLR SKN COVD -B: Performed by: ORTHOPAEDIC SURGERY

## 2017-09-25 PROCEDURE — P9045 ALBUMIN (HUMAN), 5%, 250 ML: HCPCS

## 2017-09-25 PROCEDURE — 65270000029 HC RM PRIVATE

## 2017-09-25 PROCEDURE — 77030018846 HC SOL IRR STRL H20 ICUM -A: Performed by: ORTHOPAEDIC SURGERY

## 2017-09-25 PROCEDURE — 74011250637 HC RX REV CODE- 250/637

## 2017-09-25 PROCEDURE — 77030014007 HC SPNG HEMSTAT J&J -B: Performed by: ORTHOPAEDIC SURGERY

## 2017-09-25 PROCEDURE — 77030003029 HC SUT VCRL J&J -B: Performed by: ORTHOPAEDIC SURGERY

## 2017-09-25 PROCEDURE — 0SG3071 FUSION OF LUMBOSACRAL JOINT WITH AUTOLOGOUS TISSUE SUBSTITUTE, POSTERIOR APPROACH, POSTERIOR COLUMN, OPEN APPROACH: ICD-10-PCS | Performed by: ORTHOPAEDIC SURGERY

## 2017-09-25 PROCEDURE — 74011250636 HC RX REV CODE- 250/636

## 2017-09-25 PROCEDURE — 0SB20ZZ EXCISION OF LUMBAR VERTEBRAL DISC, OPEN APPROACH: ICD-10-PCS | Performed by: ORTHOPAEDIC SURGERY

## 2017-09-25 PROCEDURE — 77030019908 HC STETH ESOPH SIMS -A: Performed by: NURSE ANESTHETIST, CERTIFIED REGISTERED

## 2017-09-25 PROCEDURE — 77030003666 HC NDL SPINAL BD -A: Performed by: ORTHOPAEDIC SURGERY

## 2017-09-25 PROCEDURE — 77030033138 HC SUT PGA STRATFX J&J -B: Performed by: ORTHOPAEDIC SURGERY

## 2017-09-25 PROCEDURE — C1713 ANCHOR/SCREW BN/BN,TIS/BN: HCPCS | Performed by: ORTHOPAEDIC SURGERY

## 2017-09-25 PROCEDURE — 77030022704 HC SUT VLOC COVD -B: Performed by: ORTHOPAEDIC SURGERY

## 2017-09-25 PROCEDURE — 07DR3ZZ EXTRACTION OF ILIAC BONE MARROW, PERCUTANEOUS APPROACH: ICD-10-PCS | Performed by: ORTHOPAEDIC SURGERY

## 2017-09-25 PROCEDURE — 72100 X-RAY EXAM L-S SPINE 2/3 VWS: CPT

## 2017-09-25 PROCEDURE — 0SB40ZZ EXCISION OF LUMBOSACRAL DISC, OPEN APPROACH: ICD-10-PCS | Performed by: ORTHOPAEDIC SURGERY

## 2017-09-25 PROCEDURE — 77030032490 HC SLV COMPR SCD KNE COVD -B: Performed by: ORTHOPAEDIC SURGERY

## 2017-09-25 PROCEDURE — 77030011266 HC ELECTRD BLD INSL COVD -A: Performed by: ORTHOPAEDIC SURGERY

## 2017-09-25 PROCEDURE — 0SG0071 FUSION OF LUMBAR VERTEBRAL JOINT WITH AUTOLOGOUS TISSUE SUBSTITUTE, POSTERIOR APPROACH, POSTERIOR COLUMN, OPEN APPROACH: ICD-10-PCS | Performed by: ORTHOPAEDIC SURGERY

## 2017-09-25 PROCEDURE — 77030034849: Performed by: ORTHOPAEDIC SURGERY

## 2017-09-25 PROCEDURE — 74011250637 HC RX REV CODE- 250/637: Performed by: ORTHOPAEDIC SURGERY

## 2017-09-25 PROCEDURE — 74011000258 HC RX REV CODE- 258: Performed by: ORTHOPAEDIC SURGERY

## 2017-09-25 PROCEDURE — 77030034479 HC ADH SKN CLSR PRINEO J&J -B: Performed by: ORTHOPAEDIC SURGERY

## 2017-09-25 PROCEDURE — 76060000038 HC ANESTHESIA 3.5 TO 4 HR: Performed by: ORTHOPAEDIC SURGERY

## 2017-09-25 PROCEDURE — 77030008684 HC TU ET CUF COVD -B: Performed by: NURSE ANESTHETIST, CERTIFIED REGISTERED

## 2017-09-25 PROCEDURE — 77030037914 HC SPCR SPN ALTERA GLBM -K1: Performed by: ORTHOPAEDIC SURGERY

## 2017-09-25 PROCEDURE — 77030018836 HC SOL IRR NACL ICUM -A: Performed by: ORTHOPAEDIC SURGERY

## 2017-09-25 PROCEDURE — 76210000016 HC OR PH I REC 1 TO 1.5 HR: Performed by: ORTHOPAEDIC SURGERY

## 2017-09-25 PROCEDURE — 77030034475 HC MISC IMPL SPN: Performed by: ORTHOPAEDIC SURGERY

## 2017-09-25 PROCEDURE — 74011250636 HC RX REV CODE- 250/636: Performed by: ORTHOPAEDIC SURGERY

## 2017-09-25 PROCEDURE — 77030036946 HC GRFT BN FBR CORT 3DEMIN 10CC BACT -G: Performed by: ORTHOPAEDIC SURGERY

## 2017-09-25 PROCEDURE — 74011250636 HC RX REV CODE- 250/636: Performed by: ANESTHESIOLOGY

## 2017-09-25 PROCEDURE — 77030004391 HC BUR FLUT MEDT -C: Performed by: ORTHOPAEDIC SURGERY

## 2017-09-25 PROCEDURE — 77030012961 HC IRR KT CYSTO/TUR ICUM -A: Performed by: ORTHOPAEDIC SURGERY

## 2017-09-25 PROCEDURE — 77030013079 HC BLNKT BAIR HGGR 3M -A: Performed by: NURSE ANESTHETIST, CERTIFIED REGISTERED

## 2017-09-25 PROCEDURE — 76010000134 HC OR TIME 3.5 TO 4 HR: Performed by: ORTHOPAEDIC SURGERY

## 2017-09-25 PROCEDURE — 77030026438 HC STYL ET INTUB CARD -A: Performed by: NURSE ANESTHETIST, CERTIFIED REGISTERED

## 2017-09-25 PROCEDURE — 74011000272 HC RX REV CODE- 272: Performed by: ORTHOPAEDIC SURGERY

## 2017-09-25 PROCEDURE — 81025 URINE PREGNANCY TEST: CPT

## 2017-09-25 DEVICE — GRAFT BNE SUB M W20XH7XL30MM COMPRESSIBLE SPNG STRP PROVIDE: Type: IMPLANTABLE DEVICE | Site: SPINE LUMBAR | Status: FUNCTIONAL

## 2017-09-25 DEVICE — ALTERA SPACER, 10 X 31, 8-12MM, 8&DEG;
Type: IMPLANTABLE DEVICE | Site: SPINE LUMBAR | Status: FUNCTIONAL
Brand: ALTERA

## 2017-09-25 DEVICE — ROD SPNL L40MM DIA55MM RAD 100MM POST PEDCL PURE TI HRD: Type: IMPLANTABLE DEVICE | Site: SPINE LUMBAR | Status: FUNCTIONAL

## 2017-09-25 DEVICE — SCREW SPNL L40MM DIA7MM PEDCL TI ALLY POLYAX CANN THRD MTRX: Type: IMPLANTABLE DEVICE | Site: SPINE LUMBAR | Status: FUNCTIONAL

## 2017-09-25 DEVICE — IMPLANTABLE DEVICE: Type: IMPLANTABLE DEVICE | Site: SPINE LUMBAR | Status: FUNCTIONAL

## 2017-09-25 RX ORDER — HYDROXYZINE HYDROCHLORIDE 10 MG/1
10 TABLET, FILM COATED ORAL
Status: DISCONTINUED | OUTPATIENT
Start: 2017-09-25 | End: 2017-09-27 | Stop reason: HOSPADM

## 2017-09-25 RX ORDER — LIDOCAINE HYDROCHLORIDE 10 MG/ML
0.1 INJECTION, SOLUTION EPIDURAL; INFILTRATION; INTRACAUDAL; PERINEURAL AS NEEDED
Status: DISCONTINUED | OUTPATIENT
Start: 2017-09-25 | End: 2017-09-25 | Stop reason: HOSPADM

## 2017-09-25 RX ORDER — SODIUM CHLORIDE 0.9 % (FLUSH) 0.9 %
5-10 SYRINGE (ML) INJECTION AS NEEDED
Status: DISCONTINUED | OUTPATIENT
Start: 2017-09-25 | End: 2017-09-27 | Stop reason: HOSPADM

## 2017-09-25 RX ORDER — VALACYCLOVIR HYDROCHLORIDE 500 MG/1
500 TABLET, FILM COATED ORAL DAILY
Status: DISCONTINUED | OUTPATIENT
Start: 2017-09-26 | End: 2017-09-27 | Stop reason: HOSPADM

## 2017-09-25 RX ORDER — VANCOMYCIN/0.9 % SOD CHLORIDE 1.5G/250ML
1500 PLASTIC BAG, INJECTION (ML) INTRAVENOUS ONCE
Status: COMPLETED | OUTPATIENT
Start: 2017-09-25 | End: 2017-09-25

## 2017-09-25 RX ORDER — HYDROMORPHONE HYDROCHLORIDE 2 MG/ML
1 INJECTION, SOLUTION INTRAMUSCULAR; INTRAVENOUS; SUBCUTANEOUS
Status: ACTIVE | OUTPATIENT
Start: 2017-09-25 | End: 2017-09-26

## 2017-09-25 RX ORDER — SUMATRIPTAN 6 MG/.5ML
6 INJECTION, SOLUTION SUBCUTANEOUS AS NEEDED
Status: DISCONTINUED | OUTPATIENT
Start: 2017-09-25 | End: 2017-09-27 | Stop reason: HOSPADM

## 2017-09-25 RX ORDER — ONDANSETRON 4 MG/1
8 TABLET, ORALLY DISINTEGRATING ORAL
Status: DISCONTINUED | OUTPATIENT
Start: 2017-09-25 | End: 2017-09-27 | Stop reason: HOSPADM

## 2017-09-25 RX ORDER — SODIUM CHLORIDE 0.9 % (FLUSH) 0.9 %
5-10 SYRINGE (ML) INJECTION EVERY 8 HOURS
Status: DISCONTINUED | OUTPATIENT
Start: 2017-09-26 | End: 2017-09-27 | Stop reason: HOSPADM

## 2017-09-25 RX ORDER — HYDROMORPHONE HYDROCHLORIDE 1 MG/ML
.2-.5 INJECTION, SOLUTION INTRAMUSCULAR; INTRAVENOUS; SUBCUTANEOUS
Status: DISCONTINUED | OUTPATIENT
Start: 2017-09-25 | End: 2017-09-25 | Stop reason: HOSPADM

## 2017-09-25 RX ORDER — SODIUM CHLORIDE, SODIUM LACTATE, POTASSIUM CHLORIDE, CALCIUM CHLORIDE 600; 310; 30; 20 MG/100ML; MG/100ML; MG/100ML; MG/100ML
25 INJECTION, SOLUTION INTRAVENOUS CONTINUOUS
Status: DISCONTINUED | OUTPATIENT
Start: 2017-09-25 | End: 2017-09-25 | Stop reason: HOSPADM

## 2017-09-25 RX ORDER — DEXAMETHASONE SODIUM PHOSPHATE 4 MG/ML
INJECTION, SOLUTION INTRA-ARTICULAR; INTRALESIONAL; INTRAMUSCULAR; INTRAVENOUS; SOFT TISSUE AS NEEDED
Status: DISCONTINUED | OUTPATIENT
Start: 2017-09-25 | End: 2017-09-25 | Stop reason: HOSPADM

## 2017-09-25 RX ORDER — METHOCARBAMOL 750 MG/1
750 TABLET, FILM COATED ORAL 4 TIMES DAILY
Status: DISCONTINUED | OUTPATIENT
Start: 2017-09-25 | End: 2017-09-27 | Stop reason: HOSPADM

## 2017-09-25 RX ORDER — FACIAL-BODY WIPES
10 EACH TOPICAL DAILY PRN
Status: DISCONTINUED | OUTPATIENT
Start: 2017-09-27 | End: 2017-09-27 | Stop reason: HOSPADM

## 2017-09-25 RX ORDER — OXYCODONE HYDROCHLORIDE 5 MG/1
5 TABLET ORAL
Status: DISCONTINUED | OUTPATIENT
Start: 2017-09-25 | End: 2017-09-26

## 2017-09-25 RX ORDER — SIMVASTATIN 40 MG/1
TABLET, FILM COATED ORAL
COMMUNITY

## 2017-09-25 RX ORDER — MIDAZOLAM HYDROCHLORIDE 1 MG/ML
INJECTION, SOLUTION INTRAMUSCULAR; INTRAVENOUS AS NEEDED
Status: DISCONTINUED | OUTPATIENT
Start: 2017-09-25 | End: 2017-09-25 | Stop reason: HOSPADM

## 2017-09-25 RX ORDER — DIPHENHYDRAMINE HYDROCHLORIDE 50 MG/ML
12.5 INJECTION, SOLUTION INTRAMUSCULAR; INTRAVENOUS AS NEEDED
Status: DISCONTINUED | OUTPATIENT
Start: 2017-09-25 | End: 2017-09-25 | Stop reason: HOSPADM

## 2017-09-25 RX ORDER — METHYLPHENIDATE HYDROCHLORIDE 40 MG/1
40 CAPSULE, EXTENDED RELEASE ORAL
Status: DISCONTINUED | OUTPATIENT
Start: 2017-09-26 | End: 2017-09-26

## 2017-09-25 RX ORDER — ROCURONIUM BROMIDE 10 MG/ML
INJECTION, SOLUTION INTRAVENOUS AS NEEDED
Status: DISCONTINUED | OUTPATIENT
Start: 2017-09-25 | End: 2017-09-25 | Stop reason: HOSPADM

## 2017-09-25 RX ORDER — METHYLPHENIDATE HYDROCHLORIDE 10 MG/1
10 TABLET ORAL
COMMUNITY

## 2017-09-25 RX ORDER — PROPOFOL 10 MG/ML
INJECTION, EMULSION INTRAVENOUS AS NEEDED
Status: DISCONTINUED | OUTPATIENT
Start: 2017-09-25 | End: 2017-09-25 | Stop reason: HOSPADM

## 2017-09-25 RX ORDER — ATORVASTATIN CALCIUM 40 MG/1
40 TABLET, FILM COATED ORAL
Status: DISCONTINUED | OUTPATIENT
Start: 2017-09-25 | End: 2017-09-27 | Stop reason: HOSPADM

## 2017-09-25 RX ORDER — METHYLPHENIDATE HYDROCHLORIDE 5 MG/1
10 TABLET ORAL
Status: DISCONTINUED | OUTPATIENT
Start: 2017-09-25 | End: 2017-09-27 | Stop reason: HOSPADM

## 2017-09-25 RX ORDER — ALBUMIN HUMAN 50 G/1000ML
SOLUTION INTRAVENOUS AS NEEDED
Status: DISCONTINUED | OUTPATIENT
Start: 2017-09-25 | End: 2017-09-25 | Stop reason: HOSPADM

## 2017-09-25 RX ORDER — PREGABALIN 75 MG/1
150 CAPSULE ORAL ONCE
Status: COMPLETED | OUTPATIENT
Start: 2017-09-25 | End: 2017-09-25

## 2017-09-25 RX ORDER — FENTANYL CITRATE 50 UG/ML
25 INJECTION, SOLUTION INTRAMUSCULAR; INTRAVENOUS
Status: DISCONTINUED | OUTPATIENT
Start: 2017-09-25 | End: 2017-09-25 | Stop reason: HOSPADM

## 2017-09-25 RX ORDER — ALPRAZOLAM 0.5 MG/1
1 TABLET ORAL
Status: DISCONTINUED | OUTPATIENT
Start: 2017-09-25 | End: 2017-09-27 | Stop reason: HOSPADM

## 2017-09-25 RX ORDER — POLYETHYLENE GLYCOL 3350 17 G/17G
17 POWDER, FOR SOLUTION ORAL DAILY
Status: DISCONTINUED | OUTPATIENT
Start: 2017-09-26 | End: 2017-09-27 | Stop reason: HOSPADM

## 2017-09-25 RX ORDER — ONDANSETRON 2 MG/ML
4 INJECTION INTRAMUSCULAR; INTRAVENOUS AS NEEDED
Status: DISCONTINUED | OUTPATIENT
Start: 2017-09-25 | End: 2017-09-25 | Stop reason: HOSPADM

## 2017-09-25 RX ORDER — ONDANSETRON 2 MG/ML
4 INJECTION INTRAMUSCULAR; INTRAVENOUS
Status: ACTIVE | OUTPATIENT
Start: 2017-09-25 | End: 2017-09-26

## 2017-09-25 RX ORDER — AMOXICILLIN 250 MG
1 CAPSULE ORAL 2 TIMES DAILY
Status: DISCONTINUED | OUTPATIENT
Start: 2017-09-25 | End: 2017-09-27 | Stop reason: HOSPADM

## 2017-09-25 RX ORDER — ACETAMINOPHEN 500 MG
1000 TABLET ORAL ONCE
Status: COMPLETED | OUTPATIENT
Start: 2017-09-25 | End: 2017-09-25

## 2017-09-25 RX ORDER — VANCOMYCIN/0.9 % SOD CHLORIDE 1.5G/250ML
1500 PLASTIC BAG, INJECTION (ML) INTRAVENOUS EVERY 12 HOURS
Status: COMPLETED | OUTPATIENT
Start: 2017-09-26 | End: 2017-09-26

## 2017-09-25 RX ORDER — FAMOTIDINE 20 MG/1
20 TABLET, FILM COATED ORAL 2 TIMES DAILY
Status: DISCONTINUED | OUTPATIENT
Start: 2017-09-25 | End: 2017-09-27 | Stop reason: HOSPADM

## 2017-09-25 RX ORDER — OXYCODONE HYDROCHLORIDE 5 MG/1
10-15 TABLET ORAL
Status: DISCONTINUED | OUTPATIENT
Start: 2017-09-25 | End: 2017-09-26

## 2017-09-25 RX ORDER — ACETAMINOPHEN 500 MG
1000 TABLET ORAL EVERY 6 HOURS
Status: DISCONTINUED | OUTPATIENT
Start: 2017-09-25 | End: 2017-09-26

## 2017-09-25 RX ORDER — ONDANSETRON 2 MG/ML
INJECTION INTRAMUSCULAR; INTRAVENOUS AS NEEDED
Status: DISCONTINUED | OUTPATIENT
Start: 2017-09-25 | End: 2017-09-25 | Stop reason: HOSPADM

## 2017-09-25 RX ORDER — LEVOFLOXACIN 5 MG/ML
500 INJECTION, SOLUTION INTRAVENOUS ONCE
Status: COMPLETED | OUTPATIENT
Start: 2017-09-25 | End: 2017-09-25

## 2017-09-25 RX ORDER — SODIUM CHLORIDE 9 MG/ML
125 INJECTION, SOLUTION INTRAVENOUS CONTINUOUS
Status: DISPENSED | OUTPATIENT
Start: 2017-09-25 | End: 2017-09-26

## 2017-09-25 RX ORDER — DIAZEPAM 5 MG/1
5 TABLET ORAL
Status: DISCONTINUED | OUTPATIENT
Start: 2017-09-25 | End: 2017-09-27 | Stop reason: HOSPADM

## 2017-09-25 RX ORDER — MELATONIN
2000 DAILY
Status: DISCONTINUED | OUTPATIENT
Start: 2017-09-26 | End: 2017-09-27 | Stop reason: HOSPADM

## 2017-09-25 RX ORDER — DOCUSATE SODIUM 100 MG/1
100 CAPSULE, LIQUID FILLED ORAL 2 TIMES DAILY
Status: DISCONTINUED | OUTPATIENT
Start: 2017-09-25 | End: 2017-09-27 | Stop reason: HOSPADM

## 2017-09-25 RX ORDER — BUPROPION HYDROCHLORIDE 150 MG/1
300 TABLET ORAL
Status: DISCONTINUED | OUTPATIENT
Start: 2017-09-26 | End: 2017-09-27 | Stop reason: HOSPADM

## 2017-09-25 RX ORDER — PHENYLEPHRINE HCL IN 0.9% NACL 0.4MG/10ML
SYRINGE (ML) INTRAVENOUS AS NEEDED
Status: DISCONTINUED | OUTPATIENT
Start: 2017-09-25 | End: 2017-09-25 | Stop reason: HOSPADM

## 2017-09-25 RX ORDER — HYDROMORPHONE HYDROCHLORIDE 2 MG/ML
INJECTION, SOLUTION INTRAMUSCULAR; INTRAVENOUS; SUBCUTANEOUS AS NEEDED
Status: DISCONTINUED | OUTPATIENT
Start: 2017-09-25 | End: 2017-09-25 | Stop reason: HOSPADM

## 2017-09-25 RX ORDER — MIDAZOLAM HYDROCHLORIDE 1 MG/ML
1 INJECTION, SOLUTION INTRAMUSCULAR; INTRAVENOUS AS NEEDED
Status: DISCONTINUED | OUTPATIENT
Start: 2017-09-25 | End: 2017-09-25 | Stop reason: HOSPADM

## 2017-09-25 RX ORDER — CYCLOBENZAPRINE HCL 10 MG
10 TABLET ORAL
Status: DISCONTINUED | OUTPATIENT
Start: 2017-09-25 | End: 2017-09-27 | Stop reason: HOSPADM

## 2017-09-25 RX ORDER — DIAZEPAM 5 MG/1
TABLET ORAL
Status: COMPLETED
Start: 2017-09-25 | End: 2017-09-25

## 2017-09-25 RX ORDER — VANCOMYCIN HYDROCHLORIDE 1 G/20ML
INJECTION, POWDER, LYOPHILIZED, FOR SOLUTION INTRAVENOUS AS NEEDED
Status: DISCONTINUED | OUTPATIENT
Start: 2017-09-25 | End: 2017-09-25 | Stop reason: HOSPADM

## 2017-09-25 RX ORDER — NALOXONE HYDROCHLORIDE 0.4 MG/ML
0.4 INJECTION, SOLUTION INTRAMUSCULAR; INTRAVENOUS; SUBCUTANEOUS AS NEEDED
Status: DISCONTINUED | OUTPATIENT
Start: 2017-09-25 | End: 2017-09-27 | Stop reason: HOSPADM

## 2017-09-25 RX ORDER — DEXAMETHASONE SODIUM PHOSPHATE 4 MG/ML
10 INJECTION, SOLUTION INTRA-ARTICULAR; INTRALESIONAL; INTRAMUSCULAR; INTRAVENOUS; SOFT TISSUE ONCE
Status: COMPLETED | OUTPATIENT
Start: 2017-09-26 | End: 2017-09-26

## 2017-09-25 RX ORDER — FENTANYL CITRATE 50 UG/ML
INJECTION, SOLUTION INTRAMUSCULAR; INTRAVENOUS AS NEEDED
Status: DISCONTINUED | OUTPATIENT
Start: 2017-09-25 | End: 2017-09-25 | Stop reason: HOSPADM

## 2017-09-25 RX ORDER — LIDOCAINE HYDROCHLORIDE 20 MG/ML
INJECTION, SOLUTION EPIDURAL; INFILTRATION; INTRACAUDAL; PERINEURAL AS NEEDED
Status: DISCONTINUED | OUTPATIENT
Start: 2017-09-25 | End: 2017-09-25 | Stop reason: HOSPADM

## 2017-09-25 RX ORDER — METHOCARBAMOL 750 MG/1
750 TABLET, FILM COATED ORAL 4 TIMES DAILY
COMMUNITY

## 2017-09-25 RX ORDER — FENTANYL CITRATE 50 UG/ML
50 INJECTION, SOLUTION INTRAMUSCULAR; INTRAVENOUS AS NEEDED
Status: DISCONTINUED | OUTPATIENT
Start: 2017-09-25 | End: 2017-09-25 | Stop reason: HOSPADM

## 2017-09-25 RX ORDER — SUCCINYLCHOLINE CHLORIDE 20 MG/ML
INJECTION INTRAMUSCULAR; INTRAVENOUS AS NEEDED
Status: DISCONTINUED | OUTPATIENT
Start: 2017-09-25 | End: 2017-09-25 | Stop reason: HOSPADM

## 2017-09-25 RX ADMIN — FENTANYL CITRATE 25 MCG: 50 INJECTION, SOLUTION INTRAMUSCULAR; INTRAVENOUS at 17:00

## 2017-09-25 RX ADMIN — SUCCINYLCHOLINE CHLORIDE 160 MG: 20 INJECTION INTRAMUSCULAR; INTRAVENOUS at 13:12

## 2017-09-25 RX ADMIN — Medication 80 MCG: at 15:30

## 2017-09-25 RX ADMIN — ACETAMINOPHEN 1000 MG: 500 TABLET, FILM COATED ORAL at 20:33

## 2017-09-25 RX ADMIN — FENTANYL CITRATE 25 MCG: 50 INJECTION, SOLUTION INTRAMUSCULAR; INTRAVENOUS at 17:10

## 2017-09-25 RX ADMIN — SODIUM CHLORIDE 125 ML/HR: 900 INJECTION, SOLUTION INTRAVENOUS at 17:01

## 2017-09-25 RX ADMIN — Medication 80 MCG: at 15:35

## 2017-09-25 RX ADMIN — LEVOFLOXACIN 500 MG: 5 INJECTION, SOLUTION INTRAVENOUS at 13:43

## 2017-09-25 RX ADMIN — FAMOTIDINE 20 MG: 20 TABLET ORAL at 20:35

## 2017-09-25 RX ADMIN — SODIUM CHLORIDE, SODIUM LACTATE, POTASSIUM CHLORIDE, AND CALCIUM CHLORIDE: 600; 310; 30; 20 INJECTION, SOLUTION INTRAVENOUS at 15:06

## 2017-09-25 RX ADMIN — PROPOFOL 150 MG: 10 INJECTION, EMULSION INTRAVENOUS at 13:12

## 2017-09-25 RX ADMIN — VANCOMYCIN HYDROCHLORIDE 1500 MG: 10 INJECTION, POWDER, LYOPHILIZED, FOR SOLUTION INTRAVENOUS at 11:49

## 2017-09-25 RX ADMIN — HYDROMORPHONE HYDROCHLORIDE 1 MG: 2 INJECTION, SOLUTION INTRAMUSCULAR; INTRAVENOUS; SUBCUTANEOUS at 13:44

## 2017-09-25 RX ADMIN — ATORVASTATIN CALCIUM 40 MG: 40 TABLET, FILM COATED ORAL at 22:27

## 2017-09-25 RX ADMIN — DEXAMETHASONE SODIUM PHOSPHATE 4 MG: 4 INJECTION, SOLUTION INTRA-ARTICULAR; INTRALESIONAL; INTRAMUSCULAR; INTRAVENOUS; SOFT TISSUE at 14:26

## 2017-09-25 RX ADMIN — Medication 120 MCG: at 13:58

## 2017-09-25 RX ADMIN — ROCURONIUM BROMIDE 20 MG: 10 INJECTION, SOLUTION INTRAVENOUS at 13:23

## 2017-09-25 RX ADMIN — LIDOCAINE HYDROCHLORIDE 60 MG: 20 INJECTION, SOLUTION EPIDURAL; INFILTRATION; INTRACAUDAL; PERINEURAL at 13:12

## 2017-09-25 RX ADMIN — ACETAMINOPHEN 1000 MG: 500 TABLET ORAL at 11:52

## 2017-09-25 RX ADMIN — OXYCODONE HYDROCHLORIDE 10 MG: 5 TABLET ORAL at 20:33

## 2017-09-25 RX ADMIN — DIAZEPAM 5 MG: 5 TABLET ORAL at 17:05

## 2017-09-25 RX ADMIN — PREGABALIN 150 MG: 75 CAPSULE ORAL at 11:52

## 2017-09-25 RX ADMIN — FENTANYL CITRATE 25 MCG: 50 INJECTION, SOLUTION INTRAMUSCULAR; INTRAVENOUS at 16:55

## 2017-09-25 RX ADMIN — FENTANYL CITRATE 100 MCG: 50 INJECTION, SOLUTION INTRAMUSCULAR; INTRAVENOUS at 13:12

## 2017-09-25 RX ADMIN — FENTANYL CITRATE 25 MCG: 50 INJECTION, SOLUTION INTRAMUSCULAR; INTRAVENOUS at 17:05

## 2017-09-25 RX ADMIN — ONDANSETRON 4 MG: 2 INJECTION INTRAMUSCULAR; INTRAVENOUS at 15:56

## 2017-09-25 RX ADMIN — ROCURONIUM BROMIDE 5 MG: 10 INJECTION, SOLUTION INTRAVENOUS at 13:47

## 2017-09-25 RX ADMIN — METHOCARBAMOL 750 MG: 750 TABLET ORAL at 22:30

## 2017-09-25 RX ADMIN — MIDAZOLAM HYDROCHLORIDE 2 MG: 1 INJECTION, SOLUTION INTRAMUSCULAR; INTRAVENOUS at 13:02

## 2017-09-25 RX ADMIN — Medication 120 MCG: at 13:56

## 2017-09-25 RX ADMIN — HYDROMORPHONE HYDROCHLORIDE 0.5 MG: 2 INJECTION, SOLUTION INTRAMUSCULAR; INTRAVENOUS; SUBCUTANEOUS at 15:14

## 2017-09-25 RX ADMIN — ALBUMIN HUMAN 250 ML: 50 SOLUTION INTRAVENOUS at 15:30

## 2017-09-25 RX ADMIN — SODIUM CHLORIDE, SODIUM LACTATE, POTASSIUM CHLORIDE, AND CALCIUM CHLORIDE 25 ML/HR: 600; 310; 30; 20 INJECTION, SOLUTION INTRAVENOUS at 11:45

## 2017-09-25 RX ADMIN — ALBUMIN HUMAN 250 ML: 50 SOLUTION INTRAVENOUS at 14:39

## 2017-09-25 RX ADMIN — DOCUSATE SODIUM 100 MG: 100 CAPSULE, LIQUID FILLED ORAL at 20:35

## 2017-09-25 RX ADMIN — HYDROMORPHONE HYDROCHLORIDE 0.5 MG: 1 INJECTION, SOLUTION INTRAMUSCULAR; INTRAVENOUS; SUBCUTANEOUS at 16:55

## 2017-09-25 RX ADMIN — HYDROMORPHONE HYDROCHLORIDE 0.5 MG: 1 INJECTION, SOLUTION INTRAMUSCULAR; INTRAVENOUS; SUBCUTANEOUS at 17:15

## 2017-09-25 NOTE — PERIOP NOTES
TRANSFER - OUT REPORT:    Verbal report given to [facility] RN(name) on Jessica Hickman  being transferred to OR(unit) for routine progression of care       Report consisted of patients Situation, Background, Assessment and   Recommendations(SBAR). Information from the following report(s) OR Summary, Procedure Summary, Intake/Output and MAR was reviewed with the receiving nurse. Opportunity for questions and clarification was provided.       Patient transported with:   O2 @ 3 liters  Tech

## 2017-09-25 NOTE — BRIEF OP NOTE
BRIEF OPERATIVE NOTE    Date of Procedure: 9/25/2017   Preoperative Diagnosis: LUMBAGO, RADICULOPATHY, STENOSIS, SPONDYLISIS  Postoperative Diagnosis: LUMBAGO, RADICULOPATHY, STENOSIS, SPONDYLISIS    Procedure(s):  L2-3 DISCECTOMY, L3-5 DECOMPRESSION, L4-5 FUSION, BILATERAL FORAMINAL DECOMPRESSION Cheryl Ortiz  Surgeon(s) and Role:     * Yennifer Fischer MD - Primary         Assistant Staff:  Physician Assistant: Venkata Beckwith    Surgical Staff:  Circ-1: Scott Raphael RN  Circ-Relief: Chrissy Rodney RN; Adryan Matias RN  Physician Assistant: Julisa Beckwithma  Radiology Technician: Doni Lopez RT; RT Lindsay  Scrub Tech-1: Judith Cardenas  Scrub Tech-2: Navid Jackson  Scrub Tech-Relief: Mora Lua  Event Time In   Incision Start 1352   Incision Close      Anesthesia: General   Estimated Blood Loss: 500cc  Specimens:   ID Type Source Tests Collected by Time Destination   1 : lumbar disc and vertebral material Other   Yennifer Fischer MD 9/25/2017 1408 Discarded      Findings: stenosis/hnp/spondylolisthesis   Complications: none  Implants:   Implant Name Type Inv.  Item Serial No.  Lot No. LRB No. Used Action   GRAFT FIBERS BNE KIP 10CC -- 3DEMIN - [id number]  GRAFT FIBERS BNE KIP 10CC -- 3DEMIN [id number] [employer] NA N/A 1 Implanted   GRAFT BNE SPG STRP MED [id number] -- OSTEOAMP - [id number]  GRAFT BNE SPG STRP MED [id number] -- OSTEOAMP [id number] [facility] NA N/A 1 Implanted   SCR SPNE LETI THRD 7X40MM TI -- [employer] - [id number]  SCR SPNE LETI THRD 7X40MM TI -- [employer]X [id number] [employer] [place] NA N/A 4 Implanted   reduction head   [id number] [employer] SPINAL NA N/A 4 Implanted   locking cap   [id number] [employer] SPINAL NA N/A 4 Implanted   SPACER SPNE 8D [id number] -- ALTERA - [id number]  SPACER SPNE 8D [id number] -- Luis Ludell [id number] [employer]  N/A 1 Implanted   ZO SPNE [id number] RAD/40MM -- TI MATRIX MIS - [id number]   ZO SPNE [id number] RAD/40MM -- TI MATRIX MIS [id number] [facility] [place]   N/A 2 Implanted

## 2017-09-25 NOTE — ANESTHESIA POSTPROCEDURE EVALUATION
Post-Anesthesia Evaluation and Assessment    Patient: Eletha Brunner MRN: [id number]  SSN: [id number]    YOB: 1960  Age: 56 y.o. Sex: female       Cardiovascular Function/Vital Signs  Visit Vitals    /61    Pulse 88    Temp 36.8 °C (98.2 °F)    Resp 13    Ht 5' 6\" (1.676 m)    Wt 104.3 kg (229 lb 15 oz)    SpO2 98%    BMI 37.11 kg/m2       Patient is status post general anesthesia for Procedure(s):  L2-3 DISCECTOMY, L3-5 DECOMPRESSION, L4-5 FUSION, BILATERAL FORAMINAL DECOMPRESSION Jair Brazen Solution). Nausea/Vomiting: None    Postoperative hydration reviewed and adequate. Pain:  Pain Scale 1: Numeric (0 - 10) (09/25/17 1715)  Pain Intensity 1: 4 (09/25/17 1715)   Managed    Neurological Status:   Neuro (WDL): Exceptions to WDL (09/25/17 1645)  Neuro  Neurologic State: Drowsy; Eyes open spontaneously;Sleeping (09/25/17 1645)  Orientation Level: Oriented to person;Oriented to place;Oriented to situation (09/25/17 1645)  Cognition: Follows commands (09/25/17 1645)  Speech: Clear (09/25/17 1645)  LUE Motor Response: Purposeful (09/25/17 1645)  LLE Motor Response: Purposeful (09/25/17 1645)  RUE Motor Response: Purposeful (09/25/17 1645)  RLE Motor Response: Purposeful (09/25/17 1645)   At baseline    Mental Status and Level of Consciousness: Arousable    Pulmonary Status:   O2 Device: Nasal cannula (09/25/17 1655)   Adequate oxygenation and airway patent    Complications related to anesthesia: None    Post-anesthesia assessment completed.  No concerns    Signed By: Kirsty Smith MD     September 25, 2017

## 2017-09-25 NOTE — ROUTINE PROCESS
Bedside and Verbal shift change report given to Lorin Malin RN (oncoming nurse) by Heather Wagner RN (offgoing nurse). Report included the following information SBAR, Kardex, Intake/Output, MAR, Recent Results and Med Rec Status.

## 2017-09-25 NOTE — OP NOTES
[facility]    [address]   OP NOTE       Name:  Keyur Bhatt   MR#:  [id number]   :  1960   Account #:  [de-identified]    Surgery Date:  2017   Date of Adm:  2017       PREOPERATIVE DIAGNOSES   1. L2-L3 lumbar disk herniation. 2. L3 through L5 spinal stenosis with claudication. 3. L4-L5 degenerative spondylolisthesis. 4. L5-S1 foraminal stenosis. 5. Bilateral lower extremity sciatica. 6. Lumbago. 7. Obesity with a body mass index (BMI) of 37. POSTOPERATIVE DIAGNOSES   1. L2-L3 lumbar disk herniation. 2. L3 through L5 spinal stenosis with claudication. 3. L4-L5 degenerative spondylolisthesis. 4. L5-S1 foraminal stenosis. 5. Bilateral lower extremity sciatica. 6. Lumbago. 7. Obesity with a body mass index (BMI) of 37. PROCEDURES PERFORMED   1. L2-L3 left-sided microdiskectomy. 2. L3-L4 laminectomy, facetectomy, foraminotomy for purposes of   neural decompression of the thecal sac and the traversing and exiting   nerve roots at L3-L4. 3. L4-L5 laminectomy, facetectomy, foraminotomy for purposes of   neural decompression of the exiting and traversing nerve roots at L4-  L5.   4. L4-L5 posterior fusion with a transforaminal lumbar interbody fusion. 5. L4-L5 insertion of interbody biomechanical device. 6. L4-L5 posterior instrumentation. 7. L5-S1 far lateral decompression on the left side. 8. Use of local autograft bone for spine fusion. 9. Use of allograft bone for spine fusion. 10. Bone marrow aspirate from the right posterior superior iliac spine   for spinal fusion augmentation. SURGEON: Samanta Ren MD.    FIRST ASSISTANT: Cecile Buerger. ESTIMATED BLOOD LOSS: 500 mL. COMPLICATIONS: None. SPECIMENS REMOVED: None. ANESTHESIA: General.    DRAINS: x1. IMPLANTS: The [employer] Matrix pedicle screw system and the   [employer] 64 TLIF cage.     INDICATIONS FOR PROCEDURE: The patient is a very pleasant 56  year-old lady with the above-listed diagnoses resulting in lumbago and   sciatica that has failed to improve with nonoperative treatment. At this   point, she would like to proceed with surgical intervention. I have given   her warnings about possible complications, including but not limited to   pain, scar, bleeding, infection, nonunion, damage to surrounding   structures, death, paralysis, blindness, stroke. She understands and   wants to proceed. DESCRIPTION OF PROCEDURE: After informed consent was   obtained and the operative site was properly marked, the patient was   moved back in the operating room and underwent general   endotracheal anesthesia. She was positioned prone on the operating   room table using the [employer] frame. Her arms were   placed in a 90/90 position and knees were gently bent with pillows. Fluoroscopy was used to gilmar the level of the incision. We then   proceeded to prep and drape in the usual manner. A time-out was   obtained, verifying that this was the correct patient, the correct surgery,   the correct site, as well as that she had received IV antibiotics within   30 minutes of the incision. I then proceeded to perform a standard posterior approach to the   lumbar spine, exposing the area between L2 and S1 on the left and L4-  L5 on the right. Once that area was exposed and hemostasis was   obtained, we used fluoroscopy to verify that we were in the correct   level and then we began placing pedicle screws at L4-L5 with the   following technique. Under fluoroscopy on the AP view, the entry hole   for the pedicle was marked at the 3 o'clock and 9 o'clock positions. I   was able then to enter the pedicle with an awl tap device with   Fluoroscopy on the AP view, hit the medial wall of the pedicle. At that point, the awl   tap was stimulated with electrocautery.  With no response noted it was   removed and the premeasured screw was inserted to the same depth. With this screw in the same location it was stimulated again with   electrocautery with no response noted. I was able then to leave it in   place and once all of them were placed at L4 and L5, fluoroscopy was   turned to the lateral view. We verified that the screw tips were within   the vertebral body, avoiding the medial bridge of the pedicle and the   screw height was then adjusted. Once the screw height was adjusted, I   then turned my attention to the TLIF part of the procedure. At L4-L5 on   the left side I proceeded to drill a small partial facetectomy above the   L5 pedicle screw between the 1 o'clock and 11 o'clock position using   the following borders: The superior border was the superior aspect of   the disk, the inferior border was the superior aspect of the pedicle, the   medial border was the medial aspect of the pedicle, and the lateral   border was the lateral aspect of the pars. Once that window was   completed and we were able to find epidural fat I was able then to   begin instrumenting the disk initially with an opening wedge, followed   by blunt nicho and sharp nicho, straight and curved curettes,   straight and curved pituitaries. Once the disk was prepared and leaving   behind punctate bleeding bone, I aspirated 60 mL of bone marrow from   the right posterior superior iliac spine and used it to augment all the   bone graft used in the surgery from this point forward. This was done   using a Jamshidi needle. I was able then to place my OsteoAMP   sponge in the anterior half of the vertebral body after I soaked it in   bone marrow aspirate and then I proceeded to place my TLIF cage   In the anterior half of the vertebral body vertebral body going across the midline, turning it 90 degrees. Once   it was in the final position, I deployed it to its final height.      At this point, I then turned my attention to the decompression as all   neural structures were visualized. I began by performing a left-sided   laminotomy at L2-L3 and I removed the intervening bone and saved it   for local autograft bone. I removed the ligamentum flavum with a   Kerrison #3 and a Kerrison #4, and I gently retracted the thecal sac,   finding the large disk herniation. I used a 15 blade to perform a linear   annulotomy and I removed the disk in its entirety, removing all the free   fragments and fully decompressing the thecal sac. Once that area was   fully decompressed, I inspected the area with a Aldana ball and   verified that there was no further impingement. Once this was   completed, I proceeded to perform a decompression at L3-L4 and L4-  L5 using a Midas Herminio. I performed a U cut laminectomy at L3, a J cut   at L4, removed the intervening bone with a pituitary and then removed   the ligamentum flavum with the Kerrison, removing also the medial   overhang of the facet, freeing the nerve root in the lateral recess and   following into the neural foramina. This was done on the left at L3-L4   and bilateral at L4-L5 and then I proceeded to move down to L5-S1. Through that L4-L5 decompression I noticed that the neural foramina   on the left was still severely compressed. I proceeded then to drill a far   lateral decompression by drilling with a Midas Herminio inferior lateral to the   L5 pedicle screw, removing the lateral aspect of the pars and the   superior aspect of the facet. Once that area was fully decompressed   with a Midas Herminio, I detached the ligamentum flavum with a curette and   a Kerrison #3 was used to find the nerve root as it wrapped around the   pedicle and follow it distally, fully decompressing the nerve roots at that   level. Once this was completed, the wound was irrigated with a liter of   normal saline. Hemostasis was obtained.  I decorticated the posterior   elements at L4-L5 bilaterally then placed local autograft bone mixed   with cortical bone fibers and bone marrow aspirate. I placed in my hlaey   and locking caps and final tightened the construct in place with good   reduction of the spondylolisthesis. I proceeded then to place a gram of   vancomycin powder in the wound, closed the fascia with #1 Vicryl   figure-of-eight interrupted sutures, irrigated the subcutaneous, closed   subcutaneous with 2-0 Vicryl, and the skin with a 3-0 running   Monocryl. Dermabond was applied. The patient then awakened,   transferred to the PACU in stable condition. Please note that a modifier 22 was used for all codes in this procedure,   as the surgery took 50% longer to complete due to the patient's obesity   with a BMI of 37, requiring 50% more time to complete all aspects of   the procedure. POSTOPERATIVE PLAN: She is going to remain here for 1 or 2 days. We are going to give her SCDs and BERTHA hose for DVT prophylaxis and   Ancef for infection prophylaxis.         MD ALBERTO Nieto / Luis Enrique Mojica   D:  09/25/2017   16:27   T:  09/25/2017   18:44   Job #:  [id number]

## 2017-09-25 NOTE — ROUTINE PROCESS
Patient log rolled from the open frame Obadi Severe table to a stretcher with the assistance of five personnel. Galindo catheter discontinued. Patient transferred to PACU with a patent IV, and wound drain, head of bed elevated.  Patient on [email or web address] nasal cannula at 4L/min., accompanied by OCTAVIO Moser and JenniferCRNA

## 2017-09-25 NOTE — PROGRESS NOTES
Ortho/ NeuroSurgery NP Note    POD# 0  s/p L2-3 DISCECTOMY, L3-5 DECOMPRESSION, L4-5 FUSION, BILATERAL FORAMINAL DECOMPRESSION Courtney Perales)   This note serves as a record of a chart review. Most Recent Labs:   Lab Results   Component Value Date/Time    HGB 14.0 09/20/2017 01:38 PM    INR 1.0 09/20/2017 01:38 PM    Hemoglobin A1c 5.8 09/20/2017 01:38 PM       MRSA Screen Pre-op Negative  U/A Screen Pre-Op Positive No significant growth on culture. Body mass index is 37.11 kg/(m^2). BMI greater than 30 is classified as obesity. STOP BANG Score: 3    Social History: No significant history. Plan:  1) PT BID starting tomorrow. 2) Zelda-op Antibiotics Vancomycin  3) Discharge planning will begin on POD #1.      Hesham Grewal NP

## 2017-09-25 NOTE — ANESTHESIA PREPROCEDURE EVALUATION
Anesthetic History   No history of anesthetic complications            Review of Systems / Medical History  Patient summary reviewed, nursing notes reviewed and pertinent labs reviewed    Pulmonary  Within defined limits                 Neuro/Psych         Headaches (migraines) and psychiatric history     Cardiovascular  Within defined limits                Exercise tolerance: >4 METS     GI/Hepatic/Renal  Within defined limits              Endo/Other        Obesity and arthritis     Other Findings   Comments: Fibromyalgia     Spinal stenosis          Physical Exam    Airway  Mallampati: I  TM Distance: 4 - 6 cm  Neck ROM: normal range of motion   Mouth opening: Normal     Cardiovascular  Regular rate and rhythm,  S1 and S2 normal,  no murmur, click, rub, or gallop             Dental  No notable dental hx       Pulmonary  Breath sounds clear to auscultation               Abdominal  GI exam deferred       Other Findings            Anesthetic Plan    ASA: 3  Anesthesia type: general    Monitoring Plan: BIS      Induction: Intravenous  Anesthetic plan and risks discussed with: Patient      Glide Scope in room

## 2017-09-25 NOTE — H&P
Patient ID: Rob Richmond is a 56 y.o. female.     Chief Complaint: Pain of the Lower Back        HPI:  Rob Richmond is a 56 y.o. female with complaints of lower back pain and left buttock that causes radiating pain, tingling, cramping down right leg diffusely. She feels weakness on the right side. She has fallen, has a raised toilet seat and accommodations at home. The pain has been present over a year. It is described as deep sharp tingling stabbing and is there constantly. It is worse with lying stairs bending and standing and is not better with anything. Rob Richmond has tried prednisone 12 day pack and gabapentin. Oral steroids provided short term relief. The pain is rated 6 out of 10 on the VAS. Went to ER recently. Treated by a physician in [place].  Has done PT and seen a chiropractor in the past.             Patient Active Problem List     Diagnosis Date Noted    Acquired spondylolisthesis 08/30/2017    Spinal stenosis, lumbar region, with neurogenic claudication 08/30/2017    Lumbosacral spondylosis without myelopathy 08/30/2017    Displacement of lumbar intervertebral disc without myelopathy 08/30/2017    Low back pain 08/30/2017    Foraminal stenosis of lumbar region 08/30/2017    Bilateral sciatica 08/30/2017               Family History   Problem Relation Age of Onset    Diabetes Mother                Social History   Substance Use Topics    Smoking status: Never Smoker    Smokeless tobacco: Never Used    Alcohol use Yes          Medical History         Past Medical History:   Diagnosis Date    Anxiety      Depression      Fibromyalgia, primary      Hyperlipidemia      Osteoarthritis               Surgical History          Past Surgical History:   Procedure Laterality Date    KNEE SURGERY        NO RELEVANT SURGERIES        SHOULDER SURGERY                   Current Outpatient Prescriptions:     ALPRAZolam (XANAX) 1 MG tablet, Take 1 mg by mouth 3 (three) times a day as needed. , Disp: , Rfl: 0    cyclobenzaprine (FLEXERIL) 10 MG tablet, TAKE 1 TABLET 3 TIMES A DAY AS NEEDED, Disp: , Rfl: 0    gabapentin (NEURONTIN) 600 MG tablet, TAKE 1 TAB BY MOUTH THREE (3) TIMES DAILY. , Disp: , Rfl: 1    HYDROcodone-acetaminophen (LORCET PLUS)  MG per tablet, TAKE 1/2 TO 1 TABLET BY MOUTH EVERY 4-6 HOURS AS NEEDED, Disp: , Rfl: 0    methylphenidate (RITALIN) 10 MG tablet, Take 10 mg by mouth 3 (three) times a day as needed. , Disp: , Rfl: 0    methylphenidate LA (RITALIN LA) 40 MG 24 hr capsule, Take 40 mg by mouth once daily. , Disp: , Rfl: 0    naratriptan (AMERGE) 2.5 MG tablet, 1 TAB BY MOUTH AT ONSET OF MIGRAINE,REPEAT IN 4 HRS IF NEEDED ONLY 2 PER 24 HOURS AND 2X A WEEK, Disp: , Rfl: 2           Allergies   Allergen Reactions    Penicillins Hives, Shortness of breath and Swelling       itch         ROS:   No new bowel or bladder incontinence. No fever. No saddle anesthesia.          Objective: There were no vitals filed for this visit.     Body mass index is 37.77 kg/(m^2). , a BMI over 30 is considered obese and a BMI over 40 has been associated with a higher risk of surgical complications.     Constitutional: No acute distress. Well nourished. HEENT: Normocephalic. Respiratory:  No labored breathing. Cardiovascular:  No marked cyanosis. Skin:  No marked skin ulcers/lesions on bilateral upper or lower extremities. Psychiatric: Alert and oriented x3. Inspection: No gross deformity of bilateral upper or lower extremities. Musculoskeletal/Neurological:   Gait/balance:   -wobbly  Thoracolumbar spine:  - No tenderness to palpation  - Full range of motion.   Right lower extremity:  - No tenderness to palpation   - Full range of motion  - No pain with internal/external rotation of the hip  - Strength:  - 5 out of 5 to hip flexors  - 5 out of 5 to knee extensors  - 5 out of 5 to ankle dorsiflexors  - 5 out of 5 to great toe extensors  - 5 out of 5 to ankle plantar flexors  - Negative straight leg raise  Left lower extremity:  - No tenderness to palpation   - Full range of motion  - No pain with internal/external rotation of the hip  - Strength:  - 5 out of 5 to hip flexors  - 5 out of 5 to knee extensors  - 5 out of 5 to ankle dorsiflexors  - 5 out of 5 to great toe extensors  - 5 out of 5 to ankle plantar flexors  - Negative straight leg raise  Sensation:  - Intact to light touch  Reflexes:  - +2 Patellar tendon   - +2 Achilles tendon   Downgoing Babinski's            Radiographs:        X-ray Lumbar Spine Complete 4+ Views     Result Date: 8/30/2017  Standing. AP, Flexion, Lat, Extension. Notes Pregnant: no Indication(s): Pain Impression: Degenerative changes and L4-L5 spondylolisthesis No fracture dislocation       I independently reviewed an MRI for lumbar spine in June 2017 which showed Mild degenerative changes through large disc herniation at L2-L3 with severe stenosis   Sevever stenosis at L4-L5 moderate stenosis at L3-L4  foraminal stenosis at L5-S1 bilaterally and L4-L5 spondylolistesis   Assessment:      1. Low back pain, unspecified back pain laterality, unspecified chronicity, with sciatica presence unspecified    2. Spinal stenosis, lumbar region, with neurogenic claudication    3. Displacement of lumbar intervertebral disc without myelopathy    4. Lumbosacral spondylosis without myelopathy    5. Acquired spondylolisthesis    6. Foraminal stenosis of lumbar region    7. Bilateral sciatica            Plan:      She has a known hx of spinal stenosis and an L4-L5 spondylolisthesis. Recent increase in pain is a result of a large disc herniation at L2-L3 causing severe stenosis. Discussed surgical treatment options with her today including a L2-L3 microdiskectomy possibly with a L3-S1 decompression and L4-L5 fusion to address the stenosis at those levels.   She understands that including L3-S1 would be a more invasive surgery requiring a longer recovery whereas the L2-L3 microdiskectomy will involve less pain and a quicker recovery. She wants to proceed with surgery to address the disc herniation and stenosis. Today she was scheduled for a L2-L3 microdiskectomy, L3-L5 decompression with L4-L5 fusion and L5-S1 bilateral foraminal decompression.      I have discussed the procedure in detail with the patient and mentioned complications, including but not limited to: death, permanent disability, heart attack, stroke, lung injury or infection, blindness, ileus, bladder or bowel problems, ureter injury, bleeding, nerve injury (including numbness, pain and weakness), paralysis (which may be permanent), failure to heal, failure to fuse bone together in fusion procedures, failure to relief symptoms, failure to relief pain, increased pain, need for further surgeries, failure or breakage or hardware, malpositioning of hardware, need to fuse or operate on additional levels determined either during or after surgery, destabilization of the spine (which may require fusion or later surgery), infections (which may or may not require additional surgery), dural tears (tears of the sac holding in nerves and spinal fluid), meningitis, voice changes, vocal cord injury, hoarseness, blood clots, pulmonary embolus, Les syndrome, recurrent disc herniation, diaphragm paralysis, and anesthetic complications. Comorbidities such as obesity, smoking, rheumatoid arthritis, chronic steroid use and diabetes increase these risks. The patient understands and wants to proceed.      The patient has been prescribed a LSO spinal orthosis. The orthosis is medically necessary to reduce pain by restricting mobility of the trunk and to otherwise support weak spinal muscles and/or deformed spine.   The patient will meet with our bracing coordinator to be fit for the brace.                   Orders Placed This Encounter    X-ray lumbar spine complete 4+ views      No Follow-up on file.           Charting performed by Carmencita Moore in the presence of Janeen Aase, MD.     I, Janeen Aase, MD, personally performed the services described in this documentation, as recorded by the scribe in my presence and it accurately and completely records my words and actions.     This note has been transcribed electronically using voice recognition and is believed to be accurate, but may contain errors secondary to technological limitations

## 2017-09-25 NOTE — IP AVS SNAPSHOT
[address] [facility] 
[phone] Patient: Josesito Sanders MRN: [id number] YLP:[id number] You are allergic to the following Allergen Reactions Pcn (Penicillins) Hives Shortness of Breath Swelling  
 itch Immunizations Administered for This Admission Name Date Influenza Vaccine (Quad) PF 9/26/2017 Recent Documentation Height Weight BMI OB Status Smoking Status 1.676 m 104.3 kg 37.11 kg/m2 Having regular periods Never Smoker Emergency Contacts Name Discharge Info Relation Home Work Mobile Jr Mariama Landa DISCHARGE CAREGIVER [3] Father [15] [phone] Thalia Maher     [phone] About your hospitalization You were admitted on:  September 25, 2017 You last received care in the:  [facility] ORTHOPEDICS You were discharged on:  September 27, 2017 Unit phone number:  [phone] Why you were hospitalized Your primary diagnosis was:  Not on File Your diagnoses also included:  Spinal Stenosis Of Lumbar Region With Neurogenic Claudication Providers Seen During Your Hospitalizations Provider Role Specialty Primary office phone Shaq Grant MD Attending Provider Orthopedic Surgery [phone] Your Primary Care Physician (PCP) Primary Care Physician Office Phone Office Fax Rochelle Sims  Follow-up Information Follow up With Details Comments Contact Info Shaq Grant MD Schedule an appointment as soon as possible for a visit in 2 weeks  [address] 
[phone] AT HOME CARE On 9/27/2017 This is your home health provider. If you do not hear from them with in 24 hourse please contact them. [address] [place] [address] 
[phone] [place] [facility] [place] This is your rolling walker provider. [address] [place] [address] [facility] [address] 
[phone] Gely Krishnan MD   Isid[address] Starkey[address] [place] [address] 
[phone] Current Discharge Medication List  
  
START taking these medications Dose & Instructions Dispensing Information Comments Morning Noon Evening Bedtime  
 cyclobenzaprine 10 mg tablet Commonly known as:  FLEXERIL Your last dose was: Your next dose is:    
   
   
 Dose:  10 mg Take 1 Tab by mouth two (2) times daily as needed for Muscle Spasm(s). Quantity:  20 Tab Refills:  0  
     
   
   
   
  
 naloxone 4 mg/actuation nasal spray Commonly known as:  [employer] Your last dose was: Your next dose is:    
   
   
 Dose:  1 Spray 1 [place] by IntraNASal route as needed for Other (Respiratory depression). Give single spray into one nostril. Call 911. Give doses every 2 to 3 minutes, alternating nostrils, until assistance arrives using a new nasal spray with each dose, if patient does not respond or responds and then relapses Quantity:  1 Each Refills:  1  
     
   
   
   
  
 polyethylene glycol 17 gram/dose powder Commonly known as:  Samantha May Your last dose was: Your next dose is:    
   
   
 Dose:  17 g Take 17 g by mouth daily for 15 days. Quantity:  255 g Refills:  0 CONTINUE these medications which have CHANGED Dose & Instructions Dispensing Information Comments Morning Noon Evening Bedtime * HYDROcodone-acetaminophen  mg tablet Commonly known as:  Eliza Portillo What changed:  Another medication with the same name was added. Make sure you understand how and when to take each. Your last dose was: Your next dose is:    
   
   
 Dose:  1 Tab Take 1 Tab by mouth as needed for Pain. Refills:  0  
     
   
   
   
  
 * HYDROcodone-acetaminophen  mg tablet Commonly known as:  [address]  
 What changed: You were already taking a medication with the same name, and this prescription was added. Make sure you understand how and when to take each. Your last dose was: Your next dose is:    
   
   
 Dose:  1 Tab Take 1 Tab by mouth every four (4) hours. Max Daily Amount: 6 Tabs. Quantity:  60 Tab Refills:  0  
     
   
   
   
  
 * Notice: This list has 2 medication(s) that are the same as other medications prescribed for you. Read the directions carefully, and ask your doctor or other care provider to review them with you. CONTINUE these medications which have NOT CHANGED Dose & Instructions Dispensing Information Comments Morning Noon Evening Bedtime AMERGE 2.5 mg Tab Generic drug:  naratriptan Your last dose was: Your next dose is:    
   
   
 Dose:  2.5 mg Take 2.5 mg by mouth once as needed. 2.5 mg at onset of headache, may repeat in 4 hours if needed Refills:  0  
     
   
   
   
  
 CALCIUM CARBONATE PO Your last dose was: Your next dose is:    
   
   
 Dose:  750 mg Take 750 mg by mouth daily. Refills:  0  
     
   
   
   
  
 COLACE 100 mg capsule Generic drug:  docusate sodium Your last dose was: Your next dose is:    
   
   
 Dose:  100 mg Take 100 mg by mouth two (2) times a day. Refills:  0 DIFFERIN 0.1 % topical gel Generic drug:  adapalene Your last dose was: Your next dose is:    
   
   
 Apply  to affected area nightly. use small amount as directed Refills:  0  
     
   
   
   
  
 gabapentin 600 mg tablet Commonly known as:  NEURONTIN Your last dose was: Your next dose is:    
   
   
 Dose:  600 mg Take 1 Tab by mouth three (3) times daily. Quantity:  90 Tab Refills:  1  
     
   
   
   
  
 * RITALIN LA 40 mg LA capsule Generic drug:  methylphenidate HCl Your last dose was: Your next dose is:    
   
   
 Dose:  40 mg Take 40 mg by mouth every morning. Refills:  0  
     
   
   
   
  
 * RITALIN 10 mg tablet Generic drug:  methylphenidate HCl Your last dose was: Your next dose is:    
   
   
 Dose:  10 mg Take 10 mg by mouth three (3) times daily as needed. Refills:  0  
     
   
   
   
  
 ROBAXIN-750 750 mg tablet Generic drug:  methocarbamol Your last dose was: Your next dose is:    
   
   
 Dose:  750 mg Take 750 mg by mouth four (4) times daily. Refills:  0  
     
   
   
   
  
 simvastatin 40 mg tablet Commonly known as:  ZOCOR Your last dose was: Your next dose is: Take  by mouth nightly. Refills:  0 SUMAtriptan 6 mg/0.5 mL injection Commonly known as:  IMITREX Your last dose was: Your next dose is:    
   
   
 Dose:  6 mg  
6 mg by SubCUTAneous route as needed. Refills:  0 VALTREX 500 mg tablet Generic drug:  valACYclovir Your last dose was: Your next dose is:    
   
   
 Dose:  500 mg Take 500 mg by mouth daily. Refills:  0  
     
   
   
   
  
 VITAMIN D3 2,000 unit Tab Generic drug:  cholecalciferol (vitamin D3) Your last dose was: Your next dose is:    
   
   
 Dose:  1 Tab Take 1 Tab by mouth daily. Refills:  0 WELLBUTRIN  mg XL tablet Generic drug:  buPROPion XL Your last dose was: Your next dose is:    
   
   
 Dose:  300 mg Take 300 mg by mouth every morning. Refills:  0  
     
   
   
   
  
 XANAX 1 mg tablet Generic drug:  ALPRAZolam  
   
Your last dose was: Your next dose is:    
   
   
 Dose:  1 mg Take 1 mg by mouth nightly as needed. Refills:  0 ZOFRAN ODT 8 mg disintegrating tablet Generic drug:  ondansetron Your last dose was: Your next dose is: Dose:  8 mg Take 8 mg by mouth every eight (8) hours as needed. Refills:  0  
     
   
   
   
  
 * Notice: This list has 2 medication(s) that are the same as other medications prescribed for you. Read the directions carefully, and ask your doctor or other care provider to review them with you. Where to Get Your Medications Information on where to get these meds will be given to you by the nurse or doctor. ! Ask your nurse or doctor about these medications  
  cyclobenzaprine 10 mg tablet HYDROcodone-acetaminophen  mg tablet  
 naloxone 4 mg/actuation nasal spray  
 polyethylene glycol 17 gram/dose powder Discharge Instructions [address] [facility] Discharge Instruction Sheet: [address] [facility] 
 
DR. Thelma Reyes Pain control: 
 Typically, we will prescribe a narcotic usually 1-2 tabs every four hours is  
 sufficient for the pain. Most patients need this only for the first few weeks. You 
 should discontinue this as the pain decreases. You should not drive while taking any narcotic pain medications. Constipation Pain medicines and anesthesia can be constipating-this can be prevented by gentle physical activity and drinking plenty of fluid. It should be treated with over-the-counter medications such as Miralax or suppositories, and/or Fleets enema. You should have a bowel movement at least every other day following surgery. Incision care Keep this area clean and dry. Your dressing is designed to stay in place for 5-7 days. You will be sent home with one additional dressing to change at that time. Leave this new dressing in place until our follow up visit in the office in about 10-14 days. If staples are in place, they should be removed about 14-20 days after surgery. You may shower with this impermeable dressing in place. DO NOT take a tub bath or go swimming until cleared by your doctor. DO NOT apply lotions, oils, or creams to incision. To increase and promote healing: 
? Stop Smoking (or at least cut back on smoking). ? Eat a well-balanced diet (high in protein and vitamin C) ? If your appetite is poor, consider nutritional supplements like Ensure, Glucerna, or [place] Instant Breakfast. 
? If you are diabetic, controlling you blood sugars is very important to prevent infection and promote wound healing. Nutrition: ? If you were on a supplement such as Ensure or Glucerna) while in the hospital, please continue using them with each meal for the next 30 days. ? Eat a well-balanced diet - High in protein, high in vitamins and minerals, especially vitamin C and zinc.  
 
Restrictions: 
 Remember your \"BLT's\" 1. Limited bending at waist 
  2. Lift no more than 10 pounds 3. No Twisting If you were given a brace, wear it when out of bed. Warning signs : Please call your physician immediately at [phone] if you have ? Bleeding from incision that is constant. ? Change in mental status (unusual behavior or confusion) ? If your incision develops redness or swelling 
? Change in wound drainage (increase in amount, color, or foul odor) ? [place] over 101.5 degrees Fahrenheit  
? Headache that is not relieved with pain medication ? Tenderness or redness in the calf of your leg Emergency: CALL 911 if you have ? Shortness of breath ? Chest pain ? Localized chest pain when coughing or taking a deep breath Follow-up Please call Dr. Dimitrios Luna office for a follow up appointment in 2 weeks at [phone]. You can return to work when cleared by a physician. During normal business hours you may reach Dr. Becky Owens' team directly at [phone] if you have concerns or questions. Edmar Vizcarra Discharge Orders None [facility] Announcement We are excited to announce that we are making your provider's discharge notes available to you in [employer]. You will see these notes when they are completed and signed by the physician that discharged you from your recent hospital stay. If you have any questions or concerns about any information you see in [employer], please call the Health Information Department where you were seen or reach out to your Primary Care Provider for more information about your plan of care. Introducing [facility]! Marlo Murcia introduces [employer] patient portal. Now you can access parts of your medical record, email your doctor's office, and request medication refills online. 1. In your internet browser, go to [email or web address] 2. Click on the First Time User? Click Here link in the Sign In box. You will see the New Member Sign Up page. 3. Enter your [employer] Access Code exactly as it appears below. You will not need to use this code after youve completed the sign-up process. If you do not sign up before the expiration date, you must request a new code. · [employer] Access Code: [email or web address] Expires: 12/26/2017 11:26 AM 
 
4. Enter the last four digits of your Social Security Number (xxxx) and Date of Birth (mm/dd/yyyy) as indicated and click Submit. You will be taken to the next sign-up page. 5. Create a [employer] ID. This will be your [employer] login ID and cannot be changed, so think of one that is secure and easy to remember. 6. Create a [employer] password. You can change your password at any time. 7. Enter your Password Reset Question and Answer. This can be used at a later time if you forget your password. 8. Enter your e-mail address. You will receive e-mail notification when new information is available in [address] 9. Click Sign Up. You can now view and download portions of your medical record.  
10. Click the Download Summary menu link to download a portable copy of your medical information. If you have questions, please visit the Frequently Asked Questions section of the [employer] website. Remember, MyChart is NOT to be used for urgent needs. For medical emergencies, dial 911. Now available from your iPhone and Android! General Information Please provide this summary of care documentation to your next provider. Patient Signature:  ____________________________________________________________ Date:  ____________________________________________________________  
  
Sissy Lapine Provider Signature:  ____________________________________________________________ Date:  ____________________________________________________________

## 2017-09-26 LAB — HGB BLD-MCNC: 10.5 G/DL (ref 11.5–16)

## 2017-09-26 PROCEDURE — 74011250637 HC RX REV CODE- 250/637: Performed by: ORTHOPAEDIC SURGERY

## 2017-09-26 PROCEDURE — 90686 IIV4 VACC NO PRSV 0.5 ML IM: CPT | Performed by: ORTHOPAEDIC SURGERY

## 2017-09-26 PROCEDURE — 97161 PT EVAL LOW COMPLEX 20 MIN: CPT

## 2017-09-26 PROCEDURE — 65270000029 HC RM PRIVATE

## 2017-09-26 PROCEDURE — G8988 SELF CARE GOAL STATUS: HCPCS | Performed by: OCCUPATIONAL THERAPIST

## 2017-09-26 PROCEDURE — 97165 OT EVAL LOW COMPLEX 30 MIN: CPT | Performed by: OCCUPATIONAL THERAPIST

## 2017-09-26 PROCEDURE — 97535 SELF CARE MNGMENT TRAINING: CPT | Performed by: OCCUPATIONAL THERAPIST

## 2017-09-26 PROCEDURE — G8987 SELF CARE CURRENT STATUS: HCPCS | Performed by: OCCUPATIONAL THERAPIST

## 2017-09-26 PROCEDURE — 85018 HEMOGLOBIN: CPT | Performed by: ORTHOPAEDIC SURGERY

## 2017-09-26 PROCEDURE — 74011250637 HC RX REV CODE- 250/637: Performed by: NURSE PRACTITIONER

## 2017-09-26 PROCEDURE — 97116 GAIT TRAINING THERAPY: CPT

## 2017-09-26 PROCEDURE — 90471 IMMUNIZATION ADMIN: CPT

## 2017-09-26 PROCEDURE — 74011250636 HC RX REV CODE- 250/636: Performed by: ORTHOPAEDIC SURGERY

## 2017-09-26 PROCEDURE — 97530 THERAPEUTIC ACTIVITIES: CPT

## 2017-09-26 RX ORDER — HYDROCODONE BITARTRATE AND ACETAMINOPHEN 10; 325 MG/1; MG/1
1 TABLET ORAL EVERY 4 HOURS
Qty: 60 TAB | Refills: 0 | Status: SHIPPED | OUTPATIENT
Start: 2017-09-26

## 2017-09-26 RX ORDER — OXYCODONE HYDROCHLORIDE 5 MG/1
5-15 TABLET ORAL
Qty: 90 TAB | Refills: 0 | Status: SHIPPED | OUTPATIENT
Start: 2017-09-26 | End: 2017-09-26

## 2017-09-26 RX ORDER — SUMATRIPTAN 25 MG/1
25 TABLET, FILM COATED ORAL
Status: COMPLETED | OUTPATIENT
Start: 2017-09-26 | End: 2017-09-26

## 2017-09-26 RX ORDER — CYCLOBENZAPRINE HCL 10 MG
10 TABLET ORAL
Qty: 20 TAB | Refills: 0 | Status: SHIPPED | OUTPATIENT
Start: 2017-09-26

## 2017-09-26 RX ORDER — AMOXICILLIN 250 MG
1 CAPSULE ORAL 2 TIMES DAILY
Qty: 60 TAB | Refills: 0 | Status: SHIPPED | OUTPATIENT
Start: 2017-09-26 | End: 2017-09-26

## 2017-09-26 RX ORDER — NALOXONE HYDROCHLORIDE 4 MG/.1ML
1 SPRAY NASAL AS NEEDED
Qty: 1 EACH | Refills: 1 | Status: SHIPPED | OUTPATIENT
Start: 2017-09-26

## 2017-09-26 RX ORDER — ACETAMINOPHEN 500 MG
1000 TABLET ORAL EVERY 6 HOURS
Qty: 112 TAB | Refills: 0 | Status: SHIPPED | OUTPATIENT
Start: 2017-09-26 | End: 2017-09-26

## 2017-09-26 RX ORDER — HYDROCODONE BITARTRATE AND ACETAMINOPHEN 10; 325 MG/1; MG/1
1 TABLET ORAL
Status: DISCONTINUED | OUTPATIENT
Start: 2017-09-26 | End: 2017-09-27 | Stop reason: HOSPADM

## 2017-09-26 RX ORDER — GABAPENTIN 300 MG/1
600 CAPSULE ORAL 3 TIMES DAILY
Status: DISCONTINUED | OUTPATIENT
Start: 2017-09-26 | End: 2017-09-27 | Stop reason: HOSPADM

## 2017-09-26 RX ORDER — POLYETHYLENE GLYCOL 3350 17 G/17G
17 POWDER, FOR SOLUTION ORAL DAILY
Qty: 255 G | Refills: 0 | Status: SHIPPED | OUTPATIENT
Start: 2017-09-26 | End: 2017-10-11

## 2017-09-26 RX ORDER — CALCIUM CARBONATE 500(1250)
750 TABLET ORAL DAILY
Status: DISCONTINUED | OUTPATIENT
Start: 2017-09-27 | End: 2017-09-27 | Stop reason: HOSPADM

## 2017-09-26 RX ADMIN — METHOCARBAMOL 750 MG: 750 TABLET ORAL at 08:25

## 2017-09-26 RX ADMIN — OXYCODONE HYDROCHLORIDE 15 MG: 5 TABLET ORAL at 08:21

## 2017-09-26 RX ADMIN — ACETAMINOPHEN 1000 MG: 500 TABLET, FILM COATED ORAL at 11:47

## 2017-09-26 RX ADMIN — METHOCARBAMOL 750 MG: 750 TABLET ORAL at 13:39

## 2017-09-26 RX ADMIN — Medication 10 ML: at 21:24

## 2017-09-26 RX ADMIN — DIAZEPAM 5 MG: 5 TABLET ORAL at 18:31

## 2017-09-26 RX ADMIN — ATORVASTATIN CALCIUM 40 MG: 40 TABLET, FILM COATED ORAL at 21:19

## 2017-09-26 RX ADMIN — VALACYCLOVIR HYDROCHLORIDE 500 MG: 500 TABLET, FILM COATED ORAL at 08:25

## 2017-09-26 RX ADMIN — DIAZEPAM 5 MG: 5 TABLET ORAL at 00:32

## 2017-09-26 RX ADMIN — METHOCARBAMOL 750 MG: 750 TABLET ORAL at 21:19

## 2017-09-26 RX ADMIN — VANCOMYCIN HYDROCHLORIDE 1500 MG: 10 INJECTION, POWDER, LYOPHILIZED, FOR SOLUTION INTRAVENOUS at 00:19

## 2017-09-26 RX ADMIN — METHOCARBAMOL 750 MG: 750 TABLET ORAL at 17:43

## 2017-09-26 RX ADMIN — Medication 10 ML: at 21:19

## 2017-09-26 RX ADMIN — OXYCODONE HYDROCHLORIDE 15 MG: 5 TABLET ORAL at 11:47

## 2017-09-26 RX ADMIN — OXYCODONE HYDROCHLORIDE 15 MG: 5 TABLET ORAL at 00:32

## 2017-09-26 RX ADMIN — POLYETHYLENE GLYCOL 3350 17 G: 17 POWDER, FOR SOLUTION ORAL at 08:24

## 2017-09-26 RX ADMIN — FAMOTIDINE 20 MG: 20 TABLET ORAL at 17:44

## 2017-09-26 RX ADMIN — DEXAMETHASONE SODIUM PHOSPHATE 10 MG: 4 INJECTION, SOLUTION INTRAMUSCULAR; INTRAVENOUS at 13:39

## 2017-09-26 RX ADMIN — GABAPENTIN 600 MG: 300 CAPSULE ORAL at 15:43

## 2017-09-26 RX ADMIN — GABAPENTIN 600 MG: 300 CAPSULE ORAL at 11:46

## 2017-09-26 RX ADMIN — ACETAMINOPHEN 1000 MG: 500 TABLET, FILM COATED ORAL at 00:19

## 2017-09-26 RX ADMIN — FAMOTIDINE 20 MG: 20 TABLET ORAL at 08:26

## 2017-09-26 RX ADMIN — ACETAMINOPHEN 1000 MG: 500 TABLET, FILM COATED ORAL at 07:51

## 2017-09-26 RX ADMIN — DOCUSATE SODIUM 100 MG: 100 CAPSULE, LIQUID FILLED ORAL at 17:43

## 2017-09-26 RX ADMIN — Medication 10 ML: at 13:40

## 2017-09-26 RX ADMIN — INFLUENZA VIRUS VACCINE 0.5 ML: 15; 15; 15; 15 SUSPENSION INTRAMUSCULAR at 13:40

## 2017-09-26 RX ADMIN — HYDROCODONE BITARTRATE AND ACETAMINOPHEN 1 TABLET: 10; 325 TABLET ORAL at 15:43

## 2017-09-26 RX ADMIN — HYDROCODONE BITARTRATE AND ACETAMINOPHEN 1 TABLET: 10; 325 TABLET ORAL at 19:37

## 2017-09-26 RX ADMIN — BUPROPION HYDROCHLORIDE 300 MG: 150 TABLET, FILM COATED, EXTENDED RELEASE ORAL at 08:23

## 2017-09-26 RX ADMIN — DOCUSATE SODIUM 100 MG: 100 CAPSULE, LIQUID FILLED ORAL at 08:25

## 2017-09-26 RX ADMIN — VITAMIN D, TAB 1000IU (100/BT) 2000 UNITS: 25 TAB at 08:26

## 2017-09-26 RX ADMIN — Medication 10 ML: at 07:52

## 2017-09-26 RX ADMIN — SUMATRIPTAN SUCCINATE 25 MG: 25 TABLET ORAL at 11:48

## 2017-09-26 RX ADMIN — GABAPENTIN 600 MG: 300 CAPSULE ORAL at 21:18

## 2017-09-26 RX ADMIN — ALPRAZOLAM 1 MG: 0.5 TABLET ORAL at 21:19

## 2017-09-26 NOTE — PROGRESS NOTES
Visited by Rachel Verde Partner on 9/25/17.     TATIANNA Donato, [facility], [address] [facility] [address]     Paging Service  [phone])

## 2017-09-26 NOTE — DISCHARGE INSTRUCTIONS
[address]    Discharge Instruction Sheet: POSTERIOR SPINAL FUSION    DR. Vahe Carrion    Pain control:   Typically, we will prescribe a narcotic usually 1-2 tabs every four hours is    sufficient for the pain. Most patients need this only for the first few weeks. You   should discontinue this as the pain decreases. You should not drive while taking any narcotic pain medications. Constipation  Pain medicines and anesthesia can be constipating-this can be prevented by gentle physical activity and drinking plenty of fluid. It should be treated with over-the-counter medications such as Miralax or suppositories, and/or Fleets enema. You should have a bowel movement at least every other day following surgery. Incision care     Keep this area clean and dry. Your dressing is designed to stay in place for 5-7 days. You will be sent home with one additional dressing to change at that time. Leave this new dressing in place until our follow up visit in the office in about 10-14 days. If staples are in place, they should be removed about 14-20 days after surgery. You may shower with this impermeable dressing in place. DO NOT take a tub bath or go swimming until cleared by your doctor. DO NOT apply lotions, oils, or creams to incision. To increase and promote healing:   Stop Smoking (or at least cut back on smoking).  Eat a well-balanced diet (high in protein and vitamin C)   If your appetite is poor, consider nutritional supplements like Ensure, Glucerna, or [place] Instant Breakfast.   If you are diabetic, controlling you blood sugars is very important to prevent infection and promote wound healing. Nutrition:   If you were on a supplement such as Ensure or Glucerna) while in the hospital, please continue using them with each meal for the next 30 days.    Eat a well-balanced diet - High in protein, high in vitamins and minerals, especially vitamin C and zinc.     Restrictions:   Remember your \"BLT's\"    1. Limited bending at waist    2. Lift no more than 10 pounds    3. No Twisting     If you were given a brace, wear it when out of bed. Warning signs : Please call your physician immediately at [phone] if you have   Bleeding from incision that is constant.  Change in mental status (unusual behavior or confusion)   If your incision develops redness or swelling   Change in wound drainage (increase in amount, color, or foul odor)   [place] over 101.5 degrees Fahrenheit    Headache that is not relieved with pain medication   Tenderness or redness in the calf of your leg    Emergency: CALL 911 if you have   Shortness of breath   Chest pain   Localized chest pain when coughing or taking a deep breath    Follow-up  Please call Dr. Terrance Anderson office for a follow up appointment in 2 weeks at [phone]. You can return to work when cleared by a physician. During normal business hours you may reach Dr. Gilmar Ogden' team directly at [phone] if you have concerns or questions.     Rosaline Acuna

## 2017-09-26 NOTE — PROGRESS NOTES
Ortho/ NeuroSurgery NP Note    Patient with expected post-operative acute pain. Patient will be discharged from the hospital with opioid pain medication. Because the patient has a new or previous prescription for a benzodiazepine, the  was queried and a Naloxone prescription was provided. Patient was educated on this. 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  returned \"No matching patient identified\" although patient has several prescriptions that should reflect here.      Hesham Grewal, MADELIN

## 2017-09-26 NOTE — PROGRESS NOTES
Problem: Mobility Impaired (Adult and Pediatric)  Goal: *Acute Goals and Plan of Care (Insert Text)  Physical Therapy Goals  Initiated 9/26/2017    1. Patient will move from supine to sit and sit to supine and roll side to side in bed with modified independence within 4 days. 2. Patient will perform sit to stand with modified independence within 4 days. 3. Patient will ambulate with modified independence for 500 feet with the least restrictive device within 4 days. 4. Patient will ascend/descend 4 stairs with 2 handrail(s) with modified independence within 4 days. 5. Patient will verbalize and demonstrate understanding of spinal precautions (No bending, lifting greater than 5 lbs, or twisting; log-roll technique; frequent repositioning as instructed) within 4 days. PHYSICAL THERAPY EVALUATION  Patient: Nesha Pena (81 y.o. female)  Date: 9/26/2017  Primary Diagnosis: LUMBAGO, RADICULOPATHY, STENOSIS, SPONDYLISIS  Spinal stenosis of lumbar region with neurogenic claudication  Procedure(s) (LRB):  L2-3 DISCECTOMY, L3-5 DECOMPRESSION, L4-5 FUSION, BILATERAL FORAMINAL DECOMPRESSION Lalla Bhatti Solution) (N/A) 1 Day Post-Op   Precautions: BAck         ASSESSMENT :  Based on the objective data described below, the patient presents with deficits in mobility and independence, primarily due to decreased dynamic balance, activity tolerance, and generalized weakness following discectomy, decompression and fusion POD#1. 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 Pt lives with her , who works long hours and will been gone during the day. Pt slow with all mobility requiring additional time and cueing for transfers. Initial gait with RW progressed to no AD. Pt with increased path deviations and trunk sway. Pt very stiff and in high guard position without AD.  Returned to room and spent extensive time discussing back precautions, brace use and activity modification at home. She will benefit from brief skilled PT treatment to maximize independence and safety in mobility upon returning home. Plan to complete steps this afternoon     Patient will benefit from skilled intervention to address the above impairments. Patients rehabilitation potential is considered to be Good  Factors which may influence rehabilitation potential include:   [ ]         None noted  [ ]         Mental ability/status  [ ]         Medical condition  [ ]         Home/family situation and support systems  [ ]         Safety awareness  [ ]         Pain tolerance/management  [ ]         Other:        PLAN :  Recommendations and Planned Interventions:  [ ]           Bed Mobility Training             [ ]    Neuromuscular Re-Education  [ ]           Transfer Training                   [ ]    Orthotic/Prosthetic Training  [ ]           Gait Training                         [ ]    Modalities  [ ]           Therapeutic Exercises           [ ]    Edema Management/Control  [ ]           Therapeutic Activities            [ ]    Patient and Family Training/Education  [ ]           Other (comment):     Frequency/Duration: Patient will be followed by physical therapy  twice daily to address goals. Discharge Recommendations: Home Health  Further Equipment Recommendations for Discharge: rolling walker - possibly       SUBJECTIVE:   Patient stated Hes gone during the day.       OBJECTIVE DATA SUMMARY:   HISTORY:    Past Medical History:   Diagnosis Date    Arthritis       b/l knees, hands,shoulders and feet    Chronic pain       headaches and knees    Depression      Fibromyalgia      Headache      Narcolepsy      Thromboembolus ([facility] [address]       RLE as a child after impact injury     Past Surgical History:   Procedure Laterality Date    ENDOMETRIAL CRYOABLATION        HX  SECTION       HX HEENT   1986     Gideon Teeth removal    HX ORTHOPAEDIC Right 2017     Torn meniscus repair    HX ORTHOPAEDIC Right 2014     Bone spur removal & scope of shoulder     Prior Level of Function/Home Situation: Independent. No use of AD. Has an elevated toilet seat. Pt works full time at a desk job. Personal factors and/or comorbidities impacting plan of care: chronic pain, arthritis     Home Situation  Home Environment: Private residence  # Steps to Enter: 2  Rails to Enter: Yes  Hand Rails : Left  One/Two Story Residence: One story  Living Alone: No  Support Systems: Spouse/Significant Other/Partner, Family member(s)  Patient Expects to be Discharged to[de-identified] Private residence  Current DME Used/Available at Home: None     EXAMINATION/PRESENTATION/DECISION MAKING:   Critical Behavior:  Neurologic State: Alert  Orientation Level: Oriented X4  Cognition: Appropriate for age attention/concentration, Follows commands  Safety/Judgement: Awareness of environment, Insight into deficits, Good awareness of safety precautions  Hearing: Auditory  Auditory Impairment: None  Hearing Aids/Status: Does not own     Range Of Motion:  AROM: Within functional limits                       Strength:    Strength: Generally decreased, functional                    Tone & Sensation:   Tone: Normal              Sensation: Impaired (c/o tingling/numbness in R LE)               Coordination:     Vision:      Functional Mobility:  Bed Mobility:              Transfers:  Sit to Stand: Contact guard assistance  Stand to Sit: Contact guard assistance                       Balance:   Sitting: Intact  Standing: Impaired  Standing - Static: Good  Standing - Dynamic : Fair  Ambulation/Gait Training:  Distance (ft): 225 Feet (ft)  Assistive Device: Gait belt;Walker, rolling; Other (comment) (None)  Ambulation - Level of Assistance: Contact guard assistance        Gait Abnormalities: Antalgic;Decreased step clearance;Trunk sway increased (high guard without AD)        Base of Support: Widened     Speed/Beba: Pace decreased (<100 feet/min); Fluctuations  Step Length: Right shortened;Left shortened  Swing Pattern: Left asymmetrical;Right asymmetrical           Functional Measure:  Tinetti test:      Sitting Balance: 1  Arises: 1  Attempts to Rise: 1  Immediate Standing Balance: 1  Standing Balance: 1  Nudged: 1  Eyes Closed: 0  Turn 360 Degrees - Continuous/Discontinuous: 1  Turn 360 Degrees - Steady/Unsteady: 1  Sitting Down: 1  Balance Score: 9  Indication of Gait: 1  R Step Length/Height: 1  L Step Length/Height: 1  R Foot Clearance: 1  L Foot Clearance: 1  Step Symmetry: 0  Step Continuity: 0  Path: 1  Trunk: 1  Walking Time: 1  Gait Score: 8  Total Score: 17         Tinetti Test and G-code impairment scale:  Percentage of Impairment CH     0%    CI     1-19% CJ     20-39% CK     40-59% CL     60-79% CM     80-99% CN      100%   Tinetti  Score 0-28 28 23-27 17-22 12-16 6-11 1-5 0          Tinetti Tool Score Risk of Falls  <19 = High Fall Risk  19-24 = Moderate Fall Risk  25-28 = Low Fall Risk  Tinetti ME. Performance-Oriented Assessment of Mobility Problems in Elderly Patients. [facility] 66; [id number]. (Scoring Description: PT Bulletin Feb. 10, 1993)     Older adults: Raina Carpenter et al, 2009; n = 1000 [facility] elderly evaluated with ABC, GABRIEL, ADL, and IADL)  · Mean GABRIEL score for males aged 69-68 years = 26.21(3.40)  · Mean GARBIEL score for females age 69-68 years = 25.16(4.30)  · Mean GABRIEL score for males over 80 years = 23.29(6.02)  · Mean GABRIEL score for females over 80 years = 17.20(8.32)            G codes: In compliance with CMSs Claims Based Outcome Reporting, the following G-code set was chosen for this patient based on their primary functional limitation being treated: The outcome measure chosen to determine the severity of the functional limitation was the Tinetti with a score of 17/28 which was correlated with the impairment scale.       · Mobility - Walking and Moving Around:               - CURRENT STATUS:    CJ - 20%-39% impaired, limited or restricted               - GOAL STATUS:           CI - 1%-19% impaired, limited or restricted               - D/C STATUS:                       ---------------To be determined---------------      Physical Therapy Evaluation Charge Determination   History Examination Presentation Decision-Making   MEDIUM  Complexity : 1-2 comorbidities / personal factors will impact the outcome/ POC  LOW Complexity : 1-2 Standardized tests and measures addressing body structure, function, activity limitation and / or participation in recreation  LOW Complexity : Stable, uncomplicated  LOW Complexity : FOTO score of       Based on the above components, the patient evaluation is determined to be of the following complexity level: LOW         Activity Tolerance:   Good  Please refer to the flowsheet for vital signs taken during this treatment. After treatment:   [X]         Patient left in no apparent distress sitting up in chair  [ ]         Patient left in no apparent distress in bed  [X]         Call bell left within reach  [X]         Nursing notified  [ ]         Caregiver present  [ ]         Bed alarm activated      COMMUNICATION/EDUCATION:   The patients plan of care was discussed with: Registered Nurse.  [X]         Fall prevention education was provided and the patient/caregiver indicated understanding. [X]         Patient/family have participated as able in goal setting and plan of care. [X]         Patient/family agree to work toward stated goals and plan of care. [ ]         Patient understands intent and goals of therapy, but is neutral about his/her participation. [ ]         Patient is unable to participate in goal setting and plan of care.      Thank you for this referral.  Giselle Mancera, PT   Time Calculation: 32 mins

## 2017-09-26 NOTE — PROGRESS NOTES
CM met pt to discuss PT recommended for HH/PT. Pt was alert and oriented at the time of assessment. Pt demographic info reviewed. Pt had previous OP experience though [facility]. HH referral send to [facility]. HH accepted pt. RW referral send to [facility] through [facility]. RW will deliver to pt room tomorrow by noon by [facility]%. Pt is independent with ADLs and IADLs prior to her surgery. Pt will transport by her friend Eric Rain tomorrow. Provider info updated to pt AVS.    Care Management Interventions  PCP Verified by CM: Yes  Mode of Transport at Discharge: Self (Pt friend will transport pt in a private vehicle.)  Transition of Care Consult (CM Consult): [address]: No  Reason Outside [place]: Physician referred to specific agency  Discharge Durable Medical Equipment: Yes (Rolling walker)  Health Maintenance Reviewed: Yes  Physical Therapy Consult: Yes  Occupational Therapy Consult: Yes  Speech Therapy Consult: No  Current Support Network: Lives Alone, Lives with Caregiver (Single story home. has 4 steps at entrance.  Sarah Nix lives is with pt.)  Confirm Follow Up Transport: Family  Plan discussed with Pt/Family/Caregiver: Yes  Freedom of Choice Offered: Yes  Discharge Location  Discharge Placement: Home with home health     Matt Jules  Ext [phone]

## 2017-09-26 NOTE — PROGRESS NOTES
Bedside and Verbal shift change report given to Saba (oncoming nurse) by Vane Barrera (offgoing nurse). Report included the following information SBAR, Kardex, MAR and Recent Results   .

## 2017-09-26 NOTE — PROGRESS NOTES
Problem: Mobility Impaired (Adult and Pediatric)  Goal: *Acute Goals and Plan of Care (Insert Text)  Physical Therapy Goals  Initiated 9/26/2017    1. Patient will move from supine to sit and sit to supine and roll side to side in bed with modified independence within 4 days. 2. Patient will perform sit to stand with modified independence within 4 days. 3. Patient will ambulate with modified independence for 500 feet with the least restrictive device within 4 days. 4. Patient will ascend/descend 4 stairs with 2 handrail(s) with modified independence within 4 days. 5. Patient will verbalize and demonstrate understanding of spinal precautions (No bending, lifting greater than 5 lbs, or twisting; log-roll technique; frequent repositioning as instructed) within 4 days. PHYSICAL THERAPY TREATMENT  Patient: Rosaline Acuna (83 y.o. female)  Date: 9/26/2017  Precautions:        ASSESSMENT:  Pt received supine in bed reporting back pain 8/10, had already received pain medication, however agreeable to work with therapy. Educated on log roll to get in/out of bed with good demonstration from pt, bed flat and no use of bed rails. Completed repeated trials for independent performance. Ambulated with slow gait reporting pain in right hip(shooting). Pt walks more sure-footed with RW and would benefit from one at discharge. Cleared on steps this afternoon with supervision. Pt is anxious about returning home today as her pain is not under control, per her report and her \"friend\" will not be available, due to his work schedule. Mobility wise pt is moving at a satisfactory level for discharge, but could benefit from one more PT session tomorrow if not discharged. PT is not a factor holding back discharge at this time.    Progression toward goals:  [X]      Improving appropriately and progressing toward goals  [ ]      Improving slowly and progressing toward goals  [ ]      Not making progress toward goals and plan of care will be adjusted       PLAN:  Patient continues to benefit from skilled intervention to address the above impairments. Continue treatment per established plan of care. Discharge Recommendations:  Home Health  Further Equipment Recommendations for Discharge:  rolling walker       SUBJECTIVE:   Patient stated Im trying to walk as straight as I can.    The patient stated 3/3 back precautions. Reviewed all 3 with patient. OBJECTIVE DATA SUMMARY:   Functional Mobility Training:  Bed Mobility:  Log Rolling: Supervision  Supine to Sit: Stand-by asssistance  Sit to Supine: Stand-by asssistance           Brace donned with  minimal assistance/contact guard assist   Transfers:  Sit to Stand: Supervision  Stand to Sit: Supervision                             Ambulation/Gait Training:  Distance (ft): 350 Feet (ft)  Assistive Device: Gait belt;Walker, rolling; Other (comment)  Ambulation - Level of Assistance: Supervision        Gait Abnormalities:  (stiff)        Base of Support: Widened     Speed/Beba: Slow;Delayed  Step Length: Left shortened;Right shortened  Swing Pattern: Left asymmetrical;Right asymmetrical                            Stairs:  Number of Stairs Trained: 4  Stairs - Level of Assistance: Supervision  Rail Use: Right   Activity Tolerance:   Good  Please refer to the flowsheet for vital signs taken during this treatment.   After treatment:   [ ]  Patient left in no apparent distress sitting up in chair  [X]  Patient left in no apparent distress in bed  [X]  Call bell left within reach  [X]  Nursing notified  [ ]  Caregiver present  [ ]  Bed alarm activated      COMMUNICATION/COLLABORATION:   The patients plan of care was discussed with: Registered Nurse     Leah Huston, PT   Time Calculation: 36 mins

## 2017-09-26 NOTE — PROGRESS NOTES
ORTHO POST OP SPINE PROGRESS NOTE    2017  Admit Date: 2017  Admit Diagnosis: LUMBAGO, RADICULOPATHY, STENOSIS, SPONDYLISIS  Spinal stenosis of lumbar region with neurogenic claudication  Procedure: Procedure(s):  L2-3 DISCECTOMY, L3-5 DECOMPRESSION, L4-5 FUSION, BILATERAL FORAMINAL DECOMPRESSION Jaunita Gaucher Solution)  Post Op day: 1 Day Post-Op    Subjective:     Bernice Nova is a patient who has complaints of back pain s/p L2-3 discectomy, L3-5 decomp, L4-5 PSF, L5-S1 lateral decomp. no c/o LE pain. tolerating po and able to void. c/o hand numbness . had c/o facial numbness last night although that has resolved. .     Review of Systems: Pertinent items are noted in HPI. Objective:     PT/OT:   Distance Ambulated:           Time Ambulated (min):        Ambulation Response: Activity Response: Fairly tolerated  Assistive Device:              Assistive Device: Fall prevention device, Walker (comment)    Vital Signs:    Blood pressure 132/74, pulse 95, temperature 99.2 °F (37.3 °C), resp. rate 16, height 5' 6\" (1.676 m), weight 104.3 kg (229 lb 15 oz), last menstrual period 09/10/2017, SpO2 96 %. Temp (24hrs), Av.4 °F (36.9 °C), Min:98.2 °F (36.8 °C), Max:99.2 °F (37.3 °C)      701 -  190  In: -   Out: 621 [Urine:600; Drains:55]  1901 -  0700  In: 9026 [P.O.:200;  I.V.:2100]  Out: 1410 [Urine:800; Drains:110]    LAB:    Recent Labs      17   0418   HGB  10.5*       Wound/Drain Assessment:  Drain:      Dressing:     Physical Exam:  Neurological: no deficit  Incision clean, dry, and intact and Prineo intact  5/5 BLE    Assessment:      Patient Active Problem List   Diagnosis Code    Advice or immunization for travel [id number]    Lumbar spinal stenosis M48.06    Lumbar neuritis M54.16    Other intervertebral disc degeneration, lumbar region M51.36    Lumbar herniated disc M51.26    Radiculopathy, lumbar region M54.16    Spinal stenosis of lumbar region with neurogenic claudication M48.06       Plan:     Continue PT/OT/Rehab  Discontinue: IV and drain (lumbar)  Consult: PT  and OT    Discharge To: home likely. ? [facility] pending progress       Signed By: BARON Vora

## 2017-09-26 NOTE — PROGRESS NOTES
Problem: Self Care Deficits Care Plan (Adult)  Goal: *Acute Goals and Plan of Care (Insert Text)  Occupational Therapy Goals  Initiated 9/26/2017  1. Patient will perform grooming while standing at the sink with modified independence within 7 day(s). 2. Patient will perform bathing with modified independence within 7 day(s). 3. Patient will perform lower body dressing with modified independence within 7 day(s). 4. Patient will perform toilet transfers with modified independence within 7 day(s). 5. Patient will perform all aspects of toileting with modified independence within 7 day(s). OCCUPATIONAL THERAPY EVALUATION  Patient: Kar Doll (89 y.o. female)  Date: 9/26/2017  Primary Diagnosis: LUMBAGO, RADICULOPATHY, STENOSIS, SPONDYLISIS  Spinal stenosis of lumbar region with neurogenic claudication  Procedure(s) (LRB):  L2-3 DISCECTOMY, L3-5 DECOMPRESSION, L4-5 FUSION, BILATERAL FORAMINAL DECOMPRESSION [place] [facility]) (N/A) 1 Day Post-Op   Precautions: spinal         ASSESSMENT :  Based on the objective data described below, the patient presents with deficits in self-care, primarily due to decreased dynamic balance, activity tolerance, and general weakness. She will benefit from brief skilled OT treatment to maximize independence and safety in ADL. Patient will benefit from skilled intervention to address the above impairments.   Patients rehabilitation potential is considered to be Excellent  Factors which may influence rehabilitation potential include:   [X]             None noted  [ ]             Mental ability/status  [ ]             Medical condition  [ ]             Home/family situation and support systems  [ ]             Safety awareness  [ ]             Pain tolerance/management  [ ]             Other:        PLAN :  Recommendations and Planned Interventions:  [X]               Self Care Training                  [X]        Therapeutic Activities  [X]               Functional Mobility Training    [ ]        Cognitive Retraining  [X]               Therapeutic Exercises           [X]        Endurance Activities  [X]               Balance Training                   [ ]        Neuromuscular Re-Education  [ ]               Visual/Perceptual Training     [X]   Home Safety Training  [X]               Patient Education                 [X]        Family Training/Education  [ ]               Other (comment):     Frequency/Duration: Patient will be followed by occupational therapy 4 times a week to address goals. Discharge Recommendations: Home Health  Further Equipment Recommendations for Discharge: To be determined - may benefit from hip kit       SUBJECTIVE:   Patient stated I'll be by myself most of the day.       OBJECTIVE DATA SUMMARY:   HISTORY:   Past Medical History:   Diagnosis Date    Arthritis       b/l knees, hands,shoulders and feet    Chronic pain       headaches and knees    Depression      Fibromyalgia      Headache      Narcolepsy      Thromboembolus (HCC)       RLE as a child after impact injury     Past Surgical History:   Procedure Laterality Date    ENDOMETRIAL CRYOABLATION        HX  SECTION       HX HEENT   1986     Strong Teeth removal    HX ORTHOPAEDIC Right 2017     Torn meniscus repair    HX ORTHOPAEDIC Right      Bone spur removal & scope of shoulder        Prior Level of Function/Home Situation: ; working and driving; has been having some difficulty with lower body ADL since April;  works  Expanded or extensive additional review of patient history:      Home Situation  Home Environment: Private residence  # Steps to Enter: 2  Rails to Enter: Yes  Hand Rails : Left  One/Two Story Residence: One story  Living Alone: No  Support Systems: Family member(s)  Patient Expects to be Discharged to[de-identified] Private residence  Current DME Used/Available at Home: None  [X]  Right hand dominant             [ ]  Left hand dominant     EXAMINATION OF PERFORMANCE DEFICITS:  Cognitive/Behavioral Status:  Neurologic State: Alert  Orientation Level: Oriented X4  Cognition: Appropriate for age attention/concentration; Follows commands  Perception: Appears intact  Perseveration: No perseveration noted  Safety/Judgement: Awareness of environment; Insight into deficits;Good awareness of safety precautions        Range of Motion:  AROM: Within functional limits                          Strength:  Strength: Generally decreased, functional                 Coordination:     Fine Motor Skills-Upper: Left Intact; Right Intact    Gross Motor Skills-Upper: Left Intact; Right Intact     Tone & Sensation:  Tone: Normal  Sensation: Impaired (c/o tingling/numbness in R LE)                       Balance:  Sitting: Intact  Standing: Impaired  Standing - Static: Good  Standing - Dynamic : Fair     Functional Mobility and Transfers for ADLs:  Bed Mobility:        Transfers:  Sit to Stand: Contact guard assistance  Stand to Sit: Contact guard assistance  Toilet Transfer : Contact guard assistance     ADL Assessment:  Feeding: Independent     Oral Facial Hygiene/Grooming: Contact guard assistance     Bathing: Minimum assistance     Upper Body Dressing: Minimum assistance (for brace)     Lower Body Dressing: Minimum assistance     Toileting: Minimum assistance                 ADL Intervention and task modifications:  Educated patient on back precautions and how they will impact ADL. Discussed adaptive equipment use for lower body ADL; will attempt next treatment session. Cognitive Retraining  Safety/Judgement: Awareness of environment; Insight into deficits;Good awareness of safety precautions     Functional Measure:  Barthel Index:      Bathin  Bladder: 10  Bowels: 10  Groomin  Dressin  Feeding: 10  Mobility: 10  Stairs: 0  Toilet Use: 5  Transfer (Bed to Chair and Back): 10  Total: 65         Barthel and G-code impairment scale:  Percentage of impairment CH  0% CI  1-19% CJ  20-39% CK  40-59% CL  60-79% CM  80-99% CN  100%   Barthel Score 0-100 100 99-80 79-60 59-40 20-39 1-19    0   Barthel Score 0-20 20 17-19 13-16 9-12 5-8 1-4 0      The Barthel ADL Index: Guidelines  1. The index should be used as a record of what a patient does, not as a record of what a patient could do. 2. The main aim is to establish degree of independence from any help, physical or verbal, however minor and for whatever reason. 3. The need for supervision renders the patient not independent. 4. A patient's performance should be established using the best available evidence. Asking the patient, friends/relatives and nurses are the usual sources, but direct observation and common sense are also important. However direct testing is not needed. 5. Usually the patient's performance over the preceding 24-48 hours is important, but occasionally longer periods will be relevant. 6. Middle categories imply that the patient supplies over 50 per cent of the effort. 7. Use of aids to be independent is allowed. Red Nunez., Barthel, DMarlineW. ([id number]). Functional evaluation: the Barthel Index. [address] [facility] (14)2. [facility] sina BetoSaint John[facility] Medical CenterMECCA quigleyF, Sandy Tejada., Ricky Hardy., [facility], [address] [place] [address] (1999). Measuring the change indisability after inpatient rehabilitation; comparison of the responsiveness of the Barthel Index and Functional [place] Measure. Journal of Neurology, Neurosurgery, and Psychiatry, 66(4), 795-316. Hubert Henry, N.J.A, NIKOLAY Salmon, & Ewelina Martinez MDELMA. (2004.) Assessment of post-stroke quality of life in cost-effectiveness studies: The usefulness of the Barthel Index and the EuroQoL-5D. Quality of Life Research, 13, 836-15         G codes: In compliance with CMSs Claims Based Outcome Reporting, the following G-code set was chosen for this patient based on their primary functional limitation being treated:      The outcome measure chosen to determine the severity of the functional limitation was the Barthel Index with a score of 65/100 which was correlated with the impairment scale. · Self Care:               - CURRENT STATUS:    CJ - 20%-39% impaired, limited or restricted               - GOAL STATUS:           CI - 1%-19% impaired, limited or restricted               - D/C STATUS:                       ---------------To be determined---------------      Occupational Therapy Evaluation Charge Determination   History Examination Decision-Making   LOW Complexity : Brief history review  LOW Complexity : 1-3 performance deficits relating to physical, cognitive , or psychosocial skils that result in activity limitations and / or participation restrictions  LOW Complexity : No comorbidities that affect functional and no verbal or physical assistance needed to complete eval tasks       Based on the above components, the patient evaluation is determined to be of the following complexity level: LOW   Pain:  Pain Scale 1: Numeric (0 - 10)  Pain Intensity 1: 2  Pain Location 1: Back  Pain Orientation 1: Lower  Pain Description 1: Aching     Activity Tolerance:   Good to fair  After treatment:   [X] Patient left in no apparent distress sitting up in chair  [ ] Patient left in no apparent distress in bed  [X] Call bell left within reach  [X] Nursing notified  [X] Caregiver present  [ ] Bed alarm activated      COMMUNICATION/EDUCATION:   The patients plan of care was discussed with: Physical Therapist and Registered Nurse.  [X] Home safety education was provided and the patient/caregiver indicated understanding. [ ] Patient/family have participated as able in goal setting and plan of care. [X] Patient/family agree to work toward stated goals and plan of care. [ ] Patient understands intent and goals of therapy, but is neutral about his/her participation. [ ] Patient is unable to participate in goal setting and plan of care.   This patients plan of care is appropriate for delegation to DANIELA.      Thank you for this referral.  Brittney Thapa, OTR/L  Time Calculation: 25 mins

## 2017-09-27 VITALS
OXYGEN SATURATION: 100 % | WEIGHT: 229.94 LBS | RESPIRATION RATE: 16 BRPM | DIASTOLIC BLOOD PRESSURE: 78 MMHG | HEIGHT: 66 IN | HEART RATE: 92 BPM | SYSTOLIC BLOOD PRESSURE: 137 MMHG | BODY MASS INDEX: 36.95 KG/M2 | TEMPERATURE: 98.8 F

## 2017-09-27 LAB — HGB BLD-MCNC: 10.6 G/DL (ref 11.5–16)

## 2017-09-27 PROCEDURE — 36415 COLL VENOUS BLD VENIPUNCTURE: CPT | Performed by: ORTHOPAEDIC SURGERY

## 2017-09-27 PROCEDURE — 97530 THERAPEUTIC ACTIVITIES: CPT

## 2017-09-27 PROCEDURE — 97535 SELF CARE MNGMENT TRAINING: CPT

## 2017-09-27 PROCEDURE — 74011250637 HC RX REV CODE- 250/637: Performed by: ORTHOPAEDIC SURGERY

## 2017-09-27 PROCEDURE — 97116 GAIT TRAINING THERAPY: CPT

## 2017-09-27 PROCEDURE — 85018 HEMOGLOBIN: CPT | Performed by: ORTHOPAEDIC SURGERY

## 2017-09-27 PROCEDURE — 74011250637 HC RX REV CODE- 250/637: Performed by: NURSE PRACTITIONER

## 2017-09-27 PROCEDURE — 97110 THERAPEUTIC EXERCISES: CPT

## 2017-09-27 RX ADMIN — DIAZEPAM 5 MG: 5 TABLET ORAL at 10:48

## 2017-09-27 RX ADMIN — DOCUSATE SODIUM 100 MG: 100 CAPSULE, LIQUID FILLED ORAL at 08:52

## 2017-09-27 RX ADMIN — METHOCARBAMOL 750 MG: 750 TABLET ORAL at 08:53

## 2017-09-27 RX ADMIN — FAMOTIDINE 20 MG: 20 TABLET ORAL at 08:54

## 2017-09-27 RX ADMIN — VITAMIN D, TAB 1000IU (100/BT) 2000 UNITS: 25 TAB at 08:54

## 2017-09-27 RX ADMIN — CALCIUM CARBONATE 750 MG: 1250 TABLET ORAL at 08:54

## 2017-09-27 RX ADMIN — DOCUSATE SODIUM AND SENNOSIDES 1 TABLET: 8.6; 5 TABLET, FILM COATED ORAL at 08:52

## 2017-09-27 RX ADMIN — VALACYCLOVIR HYDROCHLORIDE 500 MG: 500 TABLET, FILM COATED ORAL at 08:53

## 2017-09-27 RX ADMIN — POLYETHYLENE GLYCOL 3350 17 G: 17 POWDER, FOR SOLUTION ORAL at 08:50

## 2017-09-27 RX ADMIN — METHOCARBAMOL 750 MG: 750 TABLET ORAL at 12:11

## 2017-09-27 RX ADMIN — HYDROCODONE BITARTRATE AND ACETAMINOPHEN 1 TABLET: 10; 325 TABLET ORAL at 12:11

## 2017-09-27 RX ADMIN — HYDROCODONE BITARTRATE AND ACETAMINOPHEN 1 TABLET: 10; 325 TABLET ORAL at 08:51

## 2017-09-27 RX ADMIN — HYDROCODONE BITARTRATE AND ACETAMINOPHEN 1 TABLET: 10; 325 TABLET ORAL at 02:42

## 2017-09-27 RX ADMIN — BUPROPION HYDROCHLORIDE 300 MG: 150 TABLET, FILM COATED, EXTENDED RELEASE ORAL at 08:52

## 2017-09-27 RX ADMIN — DIAZEPAM 5 MG: 5 TABLET ORAL at 02:47

## 2017-09-27 RX ADMIN — GABAPENTIN 600 MG: 300 CAPSULE ORAL at 08:53

## 2017-09-27 NOTE — PROGRESS NOTES
ORTHO POST OP SPINE PROGRESS NOTE    2017  Admit Date: 2017  Admit Diagnosis: LUMBAGO, RADICULOPATHY, STENOSIS, SPONDYLISIS  Spinal stenosis of lumbar region with neurogenic claudication  Procedure: Procedure(s):  L2-3 DISCECTOMY, L3-5 DECOMPRESSION, L4-5 FUSION, BILATERAL FORAMINAL DECOMPRESSION [facility])  Post Op day: 2 Days Post-Op    Subjective:     Josesito Sanders is a patient who has complaints of back pain, improved LE pain s/p L2-3 discectomy, L3-5 decomp, L4-5 fusion, L5-S1 far lat decomp. tolerating po and able to void. states she was able to sleep last night after medication changed. states the oxy will keep her awake. .     Review of Systems: Pertinent items are noted in HPI. Objective:     PT/OT:   Distance Ambulated:           Time Ambulated (min):        Ambulation Response: Activity Response: Tolerated well  Assistive Device:              Assistive Device: Walker (comment)    Vital Signs:    Blood pressure 125/58, pulse 87, temperature 99.1 °F (37.3 °C), resp. rate 16, height 5' 6\" (1.676 m), weight 104.3 kg (229 lb 15 oz), last menstrual period 09/10/2017, SpO2 100 %.     Temp (24hrs), Av.9 °F (37.2 °C), Min:98.6 °F (37 °C), Max:99.2 °F (37.3 °C)          190 -  0700  In: 720 [P.O.:720]  Out: 0772 [Urine:1400; Drains:180]    LAB:    Recent Labs      17   0258   HGB  10.6*       Wound/Drain Assessment:  Drain:      Dressing:     Physical Exam:  Neurological: no deficit  Incision clean, dry, and intact and PRINEO intact  5/5 BLE    Assessment:      Patient Active Problem List   Diagnosis Code    Advice or immunization for travel [id number]    Lumbar spinal stenosis M48.06    Lumbar neuritis M54.16    Other intervertebral disc degeneration, lumbar region M51.36    Lumbar herniated disc M51.26    Radiculopathy, lumbar region M54.16    Spinal stenosis of lumbar region with neurogenic claudication M48.06       Plan:     Continue PT/OT/Rehab  Discontinue: IV  Consult: PT  and OT    Discharge To: HOMEtoday       Signed By: BARON Ventura

## 2017-09-27 NOTE — PROGRESS NOTES
Problem: Mobility Impaired (Adult and Pediatric)  Goal: *Acute Goals and Plan of Care (Insert Text)  Physical Therapy Goals  Initiated 9/26/2017    1. Patient will move from supine to sit and sit to supine and roll side to side in bed with modified independence within 4 days. 2. Patient will perform sit to stand with modified independence within 4 days. 3. Patient will ambulate with modified independence for 500 feet with the least restrictive device within 4 days. 4. Patient will ascend/descend 4 stairs with 2 handrail(s) with modified independence within 4 days. 5. Patient will verbalize and demonstrate understanding of spinal precautions (No bending, lifting greater than 5 lbs, or twisting; log-roll technique; frequent repositioning as instructed) within 4 days. PHYSICAL THERAPY TREATMENT  Patient: Amaya Ann (89 y.o. female)  Date: 9/27/2017  Diagnosis: LUMBAGO, RADICULOPATHY, STENOSIS, SPONDYLISIS  Spinal stenosis of lumbar region with neurogenic claudication <principal problem not specified>  Procedure(s) (LRB):  L2-3 DISCECTOMY, L3-5 DECOMPRESSION, L4-5 FUSION, BILATERAL FORAMINAL DECOMPRESSION [facility]) (N/A) 2 Days Post-Op  Precautions:        ASSESSMENT:  Patient did great today, reporting only 3/10 pain with activity. Pt ambulated 350-400 ft today with RW and S with no resting periods. Reviewed getting into and out of car - practiced in gym and pt was amble to do this independently. Pt also donning brace independently. Reviewed all precautins. Taught chair push ups with straingght spine. Excellent progress. Very little pain - DC from PT. Progression toward goals:  [X]      Improving appropriately and progressing toward goals  [ ]      Improving slowly and progressing toward goals  [ ]      Not making progress toward goals and plan of care will be adjusted       PLAN:  Patient continues to benefit from skilled intervention to address the above impairments.   Continue treatment per established plan of care. Discharge Recommendations:  None  Further Equipment Recommendations for Discharge:  rolling walker (Has)       SUBJECTIVE:   Patient stated I am going home around noon. [place]   The patient stated 3/3 back precautions. OBJECTIVE DATA SUMMARY:   Critical Behavior:  Neurologic State: Alert, Appropriate for age  Orientation Level: Oriented X4  Cognition: Appropriate decision making  Safety/Judgement: Awareness of environment, Insight into deficits, Good awareness of safety precautions  Functional Mobility Training:  Bed Mobility:  Log         (not observed today - pt in chair)           Brace donned with  independence in standing at chair  Transfers:  Sit to Stand: Supervision  Stand to Sit: Supervision                     Car transfer instruction. Pt able to get in and out of simulated car with VC only. Balance:  Sitting: Intact  Sitting - Static: Good (unsupported)  Standing: Intact (with S )  Standing - Static: Good  Standing - Dynamic : Good  Ambulation/Gait Training:  Distance (ft): 350 Feet (ft)  Assistive Device: Gait belt;Walker, rolling  Ambulation - Level of Assistance: Supervision                 Base of Support: Widened     Speed/Beba: Slow                                  Stairs: did 4 steps yesterday           Therapeutic Exercises:   Knee extensions in sitting in chair  Chair push ups x 5 with straight spine  (Pt requested exercises to build up her UE and LE strength)  Pain:  Pain Scale 1: Numeric (0 - 10)  Pain Intensity 1: 6 yesterday, 3 today - feeling much better walking today  Pain Location 1: Back;Head  Pain Orientation 1: Lower  Pain Description 1: Aching  Pain Intervention(s) 1: Medication (see MAR)  Activity Tolerance:   Good, no SOB  Please refer to the flowsheet for vital signs taken during this treatment.   After treatment:   [X]  Patient left in no apparent distress sitting up in chair  [ ]  Patient left in no apparent distress in bed  [ ]  Call cisneros left within reach  [ ]  Nursing notified  [ ]  Caregiver present  [ ]  Bed alarm activated      COMMUNICATION/COLLABORATION:   The patients plan of care was discussed with: Occupational Therapist     Daniel Hernandes, PT   Time Calculation: 36 mins

## 2017-09-27 NOTE — DISCHARGE SUMMARY
Spine Discharge Summary    Patient ID:  Rupal Prajapati  [id number]  female  56 y.o.  1960    Admit date: 2017    Discharge date: 2017    Admitting Physician: Zenovia Phoenix, MD     Consulting Physician(s):   Treatment Team: Attending Provider: Zenovia Phoenix, MD; Utilization Review: Libby Deng; Care Manager: Gardenia Stein    Date of Surgery:   2017     Preoperative Diagnosis:  LUMBAGO, RADICULOPATHY, STENOSIS, SPONDYLISIS    Postoperative Diagnosis:   LUMBAGO, RADICULOPATHY, STENOSIS, SPONDYLISIS    Procedure(s):  L2-3 DISCECTOMY, L3-5 DECOMPRESSION, L4-5 FUSION, BILATERAL FORAMINAL DECOMPRESSION Arva Balint Solution)     Anesthesia Type:   General     Surgeon: Zenovia Phoenix, MD                            HPI:  Pt is a 56 y.o. female who has a history of LUMBAGO, RADICULOPATHY, STENOSIS, SPONDYLISIS  with pain and limitations of activities of daily living who presents at this time for a L2-3 DISCECTOMY, L3-5 DECOMPRESSION, L4-5 FUSION, BILATERAL FORAMINAL DECOMPRESSION  following the failure of conservative management. PMH:   Past Medical History:   Diagnosis Date    Arthritis     b/l knees, hands,shoulders and feet    Chronic pain     headaches and knees    Depression     Fibromyalgia     Headache     Narcolepsy     Thromboembolus ([facility] [address]     RLE as a child after impact injury       Body mass index is 37.11 kg/(m^2). BMI greater than 30 is classified as obesity. Medications upon admission :   Prior to Admission Medications   Prescriptions Last Dose Informant Patient Reported? Taking? CALCIUM CARBONATE PO 2017 at Unknown time  Yes Yes   Sig: Take 750 mg by mouth daily. HYDROcodone-acetaminophen (NORCO)  mg tablet 2017 at Unknown time  Yes Yes   Sig: Take 1 Tab by mouth as needed for Pain. SUMAtriptan (IMITREX) 6 mg/0.5 mL injection 2017 at 1800  Yes Yes   Si mg by SubCUTAneous route as needed.    adapalene (DIFFERIN) 0.1 % topical gel 2017  Yes No Sig: Apply  to affected area nightly. use small amount as directed   alprazolam [place]) 1 mg tablet 9/25/2017 at 0830  Yes Yes   Sig: Take 1 mg by mouth nightly as needed. buPROPion XL (WELLBUTRIN XL) 300 mg XL tablet 9/25/2017 at 0830  Yes Yes   Sig: Take 300 mg by mouth every morning. cholecalciferol, vitamin D3, (VITAMIN D3) 2,000 unit tab 9/24/2017 at Unknown time  Yes Yes   Sig: Take 1 Tab by mouth daily. docusate sodium (COLACE) 100 mg capsule 9/24/2017 at Unknown time  Yes Yes   Sig: Take 100 mg by mouth two (2) times a day.   gabapentin (NEURONTIN) 600 mg tablet 9/25/2017 at 0830  No Yes   Sig: Take 1 Tab by mouth three (3) times daily. methocarbamol (ROBAXIN-750) 750 mg tablet 9/25/2017 at 0830  Yes Yes   Sig: Take 750 mg by mouth four (4) times daily. methylphenidate HCl (RITALIN LA) 40 mg LA capsule 9/23/2017  Yes No   Sig: Take 40 mg by mouth every morning. methylphenidate HCl (RITALIN) 10 mg tablet 9/24/2017 at 1530  Yes Yes   Sig: Take 10 mg by mouth three (3) times daily as needed. naratriptan (AMERGE) 2.5 mg tablet 9/18/2017 at Unknown time  Yes Yes   Sig: Take 2.5 mg by mouth once as needed. 2.5 mg at onset of headache, may repeat in 4 hours if needed    ondansetron (ZOFRAN ODT) 8 mg disintegrating tablet 8/25/2017 at Unknown time  Yes Yes   Sig: Take 8 mg by mouth every eight (8) hours as needed. simvastatin (ZOCOR) 40 mg tablet   Yes Yes   Sig: Take  by mouth nightly. valacyclovir (VALTREX) 500 mg tablet 9/24/2017 at Unknown time  Yes Yes   Sig: Take 500 mg by mouth daily. Facility-Administered Medications: None        Allergies: Allergies   Allergen Reactions    Pcn [Penicillins] Hives, Shortness of Breath and Swelling     itch        Hospital Course: The patient underwent surgery. Complications:  None; patient tolerated the procedure well. Was taken to the PACU in stable condition and then transferred to the ortho floor.       Perioperative Antibiotics:  Ancef Postoperative Pain Management:  Oxycodone initially and then Norco for better pain control and less side effects for patient. Postoperative transfusions:    Number of units banked PRBCs =   none     Post Op complications: none    Hemoglobin at discharge:    Lab Results   Component Value Date/Time    HGB 10.6 (L) 09/27/2017 02:58 AM    INR 1.0 09/20/2017 01:38 PM       Dressing  - clean, dry and intact. No significant erythema or swelling. Neurovascular exam found to be within normal limits. Wound appears to be healing without any evidence of infection. Physical Therapy started on the day following surgery and participated in bed mobility, transfers and ambulation. Gait:  Gait  Base of Support: Widened  Speed/Beba: Slow, Delayed  Step Length: Left shortened, Right shortened  Swing Pattern: Left asymmetrical, Right asymmetrical  Gait Abnormalities:  (stiff)  Ambulation - Level of Assistance: Supervision  Distance (ft): 350 Feet (ft)  Assistive Device: Gait belt, Walker, rolling, Other (comment)  Rail Use: Right   Stairs - Level of Assistance: Supervision  Number of Stairs Trained: 4                   Discharged to: Home. Condition on Discharge:   stable    Discharge instructions:    - Take pain medications as prescribed  - Resume pre hospital diet      - Discharge activity: activity as tolerated  - Ambulate as tolerated  - Wound Care Keep wound clean and dry. See discharge instruction sheet.  - Staples, if present, to be removed 10-14 days after surgery            -DISCHARGE MEDICATION LIST     Current Discharge Medication List      START taking these medications    Details   cyclobenzaprine (FLEXERIL) 10 mg tablet Take 1 Tab by mouth two (2) times daily as needed for Muscle Spasm(s). Qty: 20 Tab, Refills: 0      polyethylene glycol (MIRALAX) 17 gram/dose powder Take 17 g by mouth daily for 15 days.   Qty: 255 g, Refills: 0      naloxone (NARCAN) 4 mg/actuation nasal spray 1 Spray by IntraNASal route as needed for Other (Respiratory depression). Give single spray into one nostril. Call 911. Give doses every 2 to 3 minutes, alternating nostrils, until assistance arrives using a new nasal spray with each dose, if patient does not respond or responds and then relapses  Qty: 1 Each, Refills: 1      !! HYDROcodone-acetaminophen (NORCO)  mg tablet Take 1 Tab by mouth every four (4) hours. Max Daily Amount: 6 Tabs. Qty: 60 Tab, Refills: 0       !! - Potential duplicate medications found. Please discuss with provider. CONTINUE these medications which have NOT CHANGED    Details   methocarbamol (ROBAXIN-750) 750 mg tablet Take 750 mg by mouth four (4) times daily. methylphenidate HCl (RITALIN) 10 mg tablet Take 10 mg by mouth three (3) times daily as needed. simvastatin (ZOCOR) 40 mg tablet Take  by mouth nightly. cholecalciferol, vitamin D3, (VITAMIN D3) 2,000 unit tab Take 1 Tab by mouth daily. !! HYDROcodone-acetaminophen (NORCO)  mg tablet Take 1 Tab by mouth as needed for Pain. CALCIUM CARBONATE PO Take 750 mg by mouth daily. gabapentin (NEURONTIN) 600 mg tablet Take 1 Tab by mouth three (3) times daily. Qty: 90 Tab, Refills: 1      SUMAtriptan (IMITREX) 6 mg/0.5 mL injection 6 mg by SubCUTAneous route as needed. docusate sodium (COLACE) 100 mg capsule Take 100 mg by mouth two (2) times a day. buPROPion XL (WELLBUTRIN XL) 300 mg XL tablet Take 300 mg by mouth every morning. alprazolam (XANAX) 1 mg tablet Take 1 mg by mouth nightly as needed. valacyclovir (VALTREX) 500 mg tablet Take 500 mg by mouth daily. naratriptan (AMERGE) 2.5 mg tablet Take 2.5 mg by mouth once as needed. 2.5 mg at onset of headache, may repeat in 4 hours if needed       ondansetron (ZOFRAN ODT) 8 mg disintegrating tablet Take 8 mg by mouth every eight (8) hours as needed.       methylphenidate HCl (RITALIN LA) 40 mg LA capsule Take 40 mg by mouth every morning. adapalene (DIFFERIN) 0.1 % topical gel Apply  to affected area nightly. use small amount as directed       !! - Potential duplicate medications found. Please discuss with provider.        per medical continuation form      -Follow up in office in 2 weeks      Signed:  Sanjana Meza  MSN, APRN, NP-C, [employer]  Orthopaedic Nurse Practitioner    9/27/2017  8:42 AM

## 2017-09-27 NOTE — PROGRESS NOTES
Bedside shift change report given to Manan Umaña (oncoming nurse) by Yrn Medina RN (offgoing nurse). Report included the following information SBAR, Kardex, Intake/Output and Recent Results.

## 2017-09-27 NOTE — PROGRESS NOTES
[address] [facility].      9/27/2017      RE: Macho Valdez      To Whom it May Concern: This is to certify that Macho Valdez was in the hospital from 9/25/17-9/27/17. During this stay she received her [id number] Influenza vaccine. Thank you for your assistance in this matter.     Sincerely,      Alexis Mcdonald NP

## 2017-09-27 NOTE — PROGRESS NOTES
Problem: Self Care Deficits Care Plan (Adult)  Goal: *Acute Goals and Plan of Care (Insert Text)  Occupational Therapy Goals  Initiated 9/26/2017  1. Patient will perform grooming while standing at the sink with modified independence within 7 day(s). 2. Patient will perform bathing with modified independence within 7 day(s). 3. Patient will perform lower body dressing with modified independence within 7 day(s). 4. Patient will perform toilet transfers with modified independence within 7 day(s). 5. Patient will perform all aspects of toileting with modified independence within 7 day(s). OCCUPATIONAL THERAPY TREATMENT  Patient: Radha Piña (65 y.o. female)  Date: 9/27/2017  Diagnosis: LUMBAGO, RADICULOPATHY, STENOSIS, SPONDYLISIS  Spinal stenosis of lumbar region with neurogenic claudication <principal problem not specified>  Procedure(s) (LRB):  L2-3 DISCECTOMY, L3-5 DECOMPRESSION, L4-5 FUSION, BILATERAL FORAMINAL DECOMPRESSION Cody Left Solution) (N/A) 2 Days Post-Op  Precautions:        ASSESSMENT:  Patient is progressing towards OT goals with anticipated discharge home today. Pt issued hip kit to assist with LB ADLs as pt is unable to perform cross leg technique independently and demonstrates slight trunk rotation while attempting to pull legs into cross leg technique. Reviewed all items of kit with pt who was mod I to don underwear, socks and shoes with appropriate AE. Reviewed home safety, bathing and toileting post back sx. Recommend discharge home with HHPT. Progression toward goals:  [X]       Improving appropriately and progressing toward goals  [ ]       Improving slowly and progressing toward goals  [ ]       Not making progress toward goals and plan of care will be adjusted       PLAN:  Patient continues to benefit from skilled intervention to address the above impairments. Continue treatment per established plan of care.   Discharge Recommendations:  HHPT ordered by CM  Further Equipment Recommendations for Discharge:  None-RW delivered to room       SUBJECTIVE:   Patient stated I have to pull my legs up but I am trying hard not to twist.      OBJECTIVE DATA SUMMARY:   Cognitive/Behavioral Status:  Neurologic State: Alert; Appropriate for age  Orientation Level: Oriented X4  Cognition: Appropriate decision making              Functional Mobility and Transfers for ADLs:  Bed Mobility:        Transfers:  Sit to Stand: Supervision        Balance:  Sitting: Intact  Sitting - Static: Good (unsupported)  Standing: Intact (with S )  Standing - Static: Good  Standing - Dynamic : Good     ADL Intervention:     Pt educated on 3/3 back precautions. Pt educated on the following: no bending, no lifting greater than 5 lbs, no twisting, log-roll technique, repositioning every 20-30 min except when sleeping, brace when OOB . Bathing: Patient instructed when bathing to not submerge wound in water, stand to shower or sponge bathe. Toileting: Pt instructed to avoid twisting during bowel hygiene. Educated pt on techniques such as flexing at the hips, wiping front to back, using wet wipes, or adaptive equipment such as toilet tongs or [employer]. Lower Body Dressing Assistance  Underpants: Modified independent (after demosntration and education on use of reacher )  Socks: Modified independent (after education)  Slip on Shoes with Back: Modified independent (use of long handled shoe horn)  Position Performed: Seated in chair  Cues: Verbal cues provided  Adaptive Equipment Used: Dressing stick; Sock aid; Long handled shoe horn              Pain:  Pain Scale 1: Numeric (0 - 10)  Pain Intensity 1: 6  Pain Location 1: Back;Head  Pain Orientation 1: Lower  Pain Description 1: Aching  Pain Intervention(s) 1: Medication (see MAR)  Activity Tolerance:   VSS  Please refer to the flowsheet for vital signs taken during this treatment.   After treatment:   [X] Patient left in no apparent distress sitting up in chair  [ ] Patient left in no apparent distress in bed  [X] Call bell left within reach  [X] Nursing notified  [ ] Caregiver present  [ ] Bed alarm activated      COMMUNICATION/COLLABORATION:   The patients plan of care was discussed with: Physical Therapist and Registered Nurse     Antonia Mandujano OT  Time Calculation: 13 mins

## 2022-01-26 LAB — MAMMOGRAPHY, EXTERNAL: NORMAL

## 2022-03-18 PROBLEM — M54.16 RADICULOPATHY, LUMBAR REGION: Status: ACTIVE | Noted: 2017-07-28

## 2022-03-18 PROBLEM — M48.061 LUMBAR SPINAL STENOSIS: Status: ACTIVE | Noted: 2017-06-28

## 2022-03-19 PROBLEM — M51.369 OTHER INTERVERTEBRAL DISC DEGENERATION, LUMBAR REGION: Status: ACTIVE | Noted: 2017-06-28

## 2022-03-19 PROBLEM — M51.36 OTHER INTERVERTEBRAL DISC DEGENERATION, LUMBAR REGION: Status: ACTIVE | Noted: 2017-06-28

## 2022-03-19 PROBLEM — M54.16 LUMBAR NEURITIS: Status: ACTIVE | Noted: 2017-06-28

## 2022-03-19 PROBLEM — M51.26 LUMBAR HERNIATED DISC: Status: ACTIVE | Noted: 2017-07-28

## 2022-03-20 PROBLEM — M48.062 SPINAL STENOSIS OF LUMBAR REGION WITH NEUROGENIC CLAUDICATION: Status: ACTIVE | Noted: 2017-09-25

## 2023-04-04 ENCOUNTER — ANESTHESIA EVENT (OUTPATIENT)
Dept: SURGERY | Age: 63
End: 2023-04-04
Payer: MEDICAID

## 2023-04-04 ENCOUNTER — HOSPITAL ENCOUNTER (OUTPATIENT)
Age: 63
LOS: 4 days | End: 2023-04-04
Attending: STUDENT IN AN ORGANIZED HEALTH CARE EDUCATION/TRAINING PROGRAM
Payer: MEDICAID

## 2023-04-04 ENCOUNTER — ANESTHESIA (OUTPATIENT)
Dept: SURGERY | Age: 63
End: 2023-04-04
Payer: MEDICAID

## 2023-04-04 PROBLEM — T81.30XA WOUND DEHISCENCE: Status: ACTIVE | Noted: 2023-04-04

## 2023-04-04 LAB
ALBUMIN SERPL-MCNC: 3.6 G/DL (ref 3.5–5)
ALBUMIN/GLOB SERPL: 1.1 (ref 1.1–2.2)
ALP SERPL-CCNC: 119 U/L (ref 45–117)
ALT SERPL-CCNC: 24 U/L (ref 12–78)
ANION GAP SERPL CALC-SCNC: 4 MMOL/L (ref 5–15)
AST SERPL-CCNC: 18 U/L (ref 15–37)
BASOPHILS # BLD: 0 K/UL (ref 0–0.1)
BASOPHILS NFR BLD: 1 % (ref 0–1)
BILIRUB SERPL-MCNC: 0.4 MG/DL (ref 0.2–1)
BUN SERPL-MCNC: 14 MG/DL (ref 6–20)
BUN/CREAT SERPL: 17 (ref 12–20)
CALCIUM SERPL-MCNC: 9.1 MG/DL (ref 8.5–10.1)
CHLORIDE SERPL-SCNC: 111 MMOL/L (ref 97–108)
CO2 SERPL-SCNC: 26 MMOL/L (ref 21–32)
COMMENT, HOLDF: NORMAL
CREAT SERPL-MCNC: 0.84 MG/DL (ref 0.55–1.02)
DIFFERENTIAL METHOD BLD: ABNORMAL
EOSINOPHIL # BLD: 0.3 K/UL (ref 0–0.4)
EOSINOPHIL NFR BLD: 5 % (ref 0–7)
ERYTHROCYTE [DISTWIDTH] IN BLOOD BY AUTOMATED COUNT: 18.4 % (ref 11.5–14.5)
GLOBULIN SER CALC-MCNC: 3.4 G/DL (ref 2–4)
GLUCOSE SERPL-MCNC: 119 MG/DL (ref 65–100)
HCT VFR BLD AUTO: 35.5 % (ref 35–47)
HGB BLD-MCNC: 11.2 G/DL (ref 11.5–16)
IMM GRANULOCYTES # BLD AUTO: 0 K/UL (ref 0–0.04)
IMM GRANULOCYTES NFR BLD AUTO: 0 % (ref 0–0.5)
LYMPHOCYTES # BLD: 0.8 K/UL (ref 0.8–3.5)
LYMPHOCYTES NFR BLD: 13 % (ref 12–49)
MCH RBC QN AUTO: 27.3 PG (ref 26–34)
MCHC RBC AUTO-ENTMCNC: 31.5 G/DL (ref 30–36.5)
MCV RBC AUTO: 86.6 FL (ref 80–99)
MONOCYTES # BLD: 0.5 K/UL (ref 0–1)
MONOCYTES NFR BLD: 9 % (ref 5–13)
NEUTS SEG # BLD: 4.5 K/UL (ref 1.8–8)
NEUTS SEG NFR BLD: 72 % (ref 32–75)
NRBC # BLD: 0.02 K/UL (ref 0–0.01)
NRBC BLD-RTO: 0.3 PER 100 WBC
PLATELET # BLD AUTO: 202 K/UL (ref 150–400)
PMV BLD AUTO: 10 FL (ref 8.9–12.9)
POTASSIUM SERPL-SCNC: 3.8 MMOL/L (ref 3.5–5.1)
PROT SERPL-MCNC: 7 G/DL (ref 6.4–8.2)
RBC # BLD AUTO: 4.1 M/UL (ref 3.8–5.2)
SAMPLES BEING HELD,HOLD: NORMAL
SODIUM SERPL-SCNC: 141 MMOL/L (ref 136–145)
WBC # BLD AUTO: 6.2 K/UL (ref 3.6–11)

## 2023-04-04 PROCEDURE — 77030027413 HC ADH SKN AESC -B: Performed by: SURGERY

## 2023-04-04 PROCEDURE — 76060000033 HC ANESTHESIA 1 TO 1.5 HR: Performed by: SURGERY

## 2023-04-04 PROCEDURE — 96376 TX/PRO/DX INJ SAME DRUG ADON: CPT

## 2023-04-04 PROCEDURE — 77030002933 HC SUT MCRYL J&J -A: Performed by: SURGERY

## 2023-04-04 PROCEDURE — 77030011825 HC SUPP SURG PSTOP S2SG -B: Performed by: SURGERY

## 2023-04-04 PROCEDURE — 80053 COMPREHEN METABOLIC PANEL: CPT

## 2023-04-04 PROCEDURE — 74011250636 HC RX REV CODE- 250/636: Performed by: NURSE ANESTHETIST, CERTIFIED REGISTERED

## 2023-04-04 PROCEDURE — 85025 COMPLETE CBC W/AUTO DIFF WBC: CPT

## 2023-04-04 PROCEDURE — 74011000250 HC RX REV CODE- 250: Performed by: NURSE ANESTHETIST, CERTIFIED REGISTERED

## 2023-04-04 PROCEDURE — 77030020143 HC AIRWY LARYN INTUB CGAS -A: Performed by: NURSE ANESTHETIST, CERTIFIED REGISTERED

## 2023-04-04 PROCEDURE — 65270000029 HC RM PRIVATE

## 2023-04-04 PROCEDURE — 76210000017 HC OR PH I REC 1.5 TO 2 HR: Performed by: SURGERY

## 2023-04-04 PROCEDURE — 77030011267 HC ELECTRD BLD COVD -A: Performed by: SURGERY

## 2023-04-04 PROCEDURE — 87205 SMEAR GRAM STAIN: CPT

## 2023-04-04 PROCEDURE — 74011250636 HC RX REV CODE- 250/636: Performed by: STUDENT IN AN ORGANIZED HEALTH CARE EDUCATION/TRAINING PROGRAM

## 2023-04-04 PROCEDURE — 74011250636 HC RX REV CODE- 250/636: Performed by: ANESTHESIOLOGY

## 2023-04-04 PROCEDURE — 74011250636 HC RX REV CODE- 250/636: Performed by: HOSPITALIST

## 2023-04-04 PROCEDURE — 77030040506 HC DRN WND MDII -A: Performed by: SURGERY

## 2023-04-04 PROCEDURE — 77030002916 HC SUT ETHLN J&J -A: Performed by: SURGERY

## 2023-04-04 PROCEDURE — 2709999900 HC NON-CHARGEABLE SUPPLY: Performed by: SURGERY

## 2023-04-04 PROCEDURE — 87075 CULTR BACTERIA EXCEPT BLOOD: CPT

## 2023-04-04 PROCEDURE — 96374 THER/PROPH/DIAG INJ IV PUSH: CPT

## 2023-04-04 PROCEDURE — 77030031139 HC SUT VCRL2 J&J -A: Performed by: SURGERY

## 2023-04-04 PROCEDURE — 77030040504 HC DRN WND MDII -B: Performed by: SURGERY

## 2023-04-04 PROCEDURE — 36415 COLL VENOUS BLD VENIPUNCTURE: CPT

## 2023-04-04 PROCEDURE — 76010000149 HC OR TIME 1 TO 1.5 HR: Performed by: SURGERY

## 2023-04-04 PROCEDURE — 96375 TX/PRO/DX INJ NEW DRUG ADDON: CPT

## 2023-04-04 PROCEDURE — 74011250637 HC RX REV CODE- 250/637: Performed by: HOSPITALIST

## 2023-04-04 PROCEDURE — 99285 EMERGENCY DEPT VISIT HI MDM: CPT

## 2023-04-04 PROCEDURE — 74011000250 HC RX REV CODE- 250: Performed by: HOSPITALIST

## 2023-04-04 RX ORDER — SIMETHICONE 125 MG
250 CAPSULE ORAL
Status: ON HOLD

## 2023-04-04 RX ORDER — SIMETHICONE 80 MG
160 TABLET,CHEWABLE ORAL
Status: ACTIVE
Start: 2023-04-04

## 2023-04-04 RX ORDER — CYCLOBENZAPRINE HCL 10 MG
10 TABLET ORAL
Status: ACTIVE
Start: 2023-04-04

## 2023-04-04 RX ORDER — LEVOFLOXACIN 5 MG/ML
500 INJECTION, SOLUTION INTRAVENOUS EVERY 24 HOURS
Status: DISCONTINUED
Start: 2023-04-04 | End: 2023-04-05 | Stop reason: DRUGHIGH

## 2023-04-04 RX ORDER — HYDROCODONE BITARTRATE AND ACETAMINOPHEN 10; 325 MG/1; MG/1
1 TABLET ORAL
Status: DISPENSED
Start: 2023-04-04

## 2023-04-04 RX ORDER — HYDROMORPHONE HYDROCHLORIDE 2 MG/ML
INJECTION, SOLUTION INTRAMUSCULAR; INTRAVENOUS; SUBCUTANEOUS AS NEEDED
Status: DISCONTINUED
Start: 2023-04-04 | End: 2023-04-04 | Stop reason: HOSPADM

## 2023-04-04 RX ORDER — ACETAMINOPHEN 325 MG/1
650 TABLET ORAL
Status: ACTIVE
Start: 2023-04-04

## 2023-04-04 RX ORDER — OLAPARIB 150 MG/1
300 TABLET, FILM COATED ORAL 2 TIMES DAILY
Status: ON HOLD
Start: 2023-03-31

## 2023-04-04 RX ORDER — AMLODIPINE BESYLATE 5 MG/1
5 TABLET ORAL DAILY
Status: ON HOLD
Start: 2023-03-31

## 2023-04-04 RX ORDER — PROPOFOL 10 MG/ML
INJECTION, EMULSION INTRAVENOUS AS NEEDED
Status: DISCONTINUED
Start: 2023-04-04 | End: 2023-04-04 | Stop reason: HOSPADM

## 2023-04-04 RX ORDER — DEXAMETHASONE SODIUM PHOSPHATE 4 MG/ML
INJECTION, SOLUTION INTRA-ARTICULAR; INTRALESIONAL; INTRAMUSCULAR; INTRAVENOUS; SOFT TISSUE AS NEEDED
Status: DISCONTINUED
Start: 2023-04-04 | End: 2023-04-04 | Stop reason: HOSPADM

## 2023-04-04 RX ORDER — MIDAZOLAM HYDROCHLORIDE 1 MG/ML
INJECTION, SOLUTION INTRAMUSCULAR; INTRAVENOUS AS NEEDED
Status: DISCONTINUED
Start: 2023-04-04 | End: 2023-04-04 | Stop reason: HOSPADM

## 2023-04-04 RX ORDER — LIDOCAINE HYDROCHLORIDE 20 MG/ML
INJECTION, SOLUTION EPIDURAL; INFILTRATION; INTRACAUDAL; PERINEURAL AS NEEDED
Status: DISCONTINUED
Start: 2023-04-04 | End: 2023-04-04 | Stop reason: HOSPADM

## 2023-04-04 RX ORDER — MORPHINE SULFATE 15 MG/1
15 TABLET ORAL
Status: DISPENSED
Start: 2023-04-04

## 2023-04-04 RX ORDER — NAPROXEN 500 MG/1
500 TABLET ORAL 2 TIMES DAILY
Status: ON HOLD
Start: 2023-03-16

## 2023-04-04 RX ORDER — AMLODIPINE BESYLATE 5 MG/1
5 TABLET ORAL DAILY
Status: DISPENSED
Start: 2023-04-05

## 2023-04-04 RX ORDER — SODIUM CHLORIDE 0.9 % (FLUSH) 0.9 %
5-40 SYRINGE (ML) INJECTION EVERY 8 HOURS
Status: DISPENSED
Start: 2023-04-04

## 2023-04-04 RX ORDER — MINOXIDIL 50 MG/G
AEROSOL, FOAM TOPICAL DAILY
Status: ON HOLD

## 2023-04-04 RX ORDER — FAMOTIDINE 10 MG/ML
INJECTION INTRAVENOUS AS NEEDED
Status: DISCONTINUED
Start: 2023-04-04 | End: 2023-04-04 | Stop reason: HOSPADM

## 2023-04-04 RX ORDER — PROCHLORPERAZINE MALEATE 10 MG
10 TABLET ORAL
Status: ON HOLD
Start: 2023-03-31

## 2023-04-04 RX ORDER — HYDROCODONE BITARTRATE AND ACETAMINOPHEN 10; 325 MG/1; MG/1
1 TABLET ORAL
Status: ON HOLD

## 2023-04-04 RX ORDER — LORATADINE 10 MG/1
10 TABLET ORAL DAILY
Status: ACTIVE
Start: 2023-04-05

## 2023-04-04 RX ORDER — SODIUM CHLORIDE, SODIUM LACTATE, POTASSIUM CHLORIDE, CALCIUM CHLORIDE 600; 310; 30; 20 MG/100ML; MG/100ML; MG/100ML; MG/100ML
100 INJECTION, SOLUTION INTRAVENOUS CONTINUOUS
Status: DISCONTINUED
Start: 2023-04-04 | End: 2023-04-04 | Stop reason: HOSPADM

## 2023-04-04 RX ORDER — MORPHINE SULFATE 4 MG/ML
4 INJECTION INTRAVENOUS
Status: COMPLETED
Start: 2023-04-04 | End: 2023-04-04

## 2023-04-04 RX ORDER — ROSUVASTATIN CALCIUM 10 MG/1
10 TABLET, COATED ORAL EVERY MORNING
Status: DISPENSED
Start: 2023-04-05

## 2023-04-04 RX ORDER — HYDROCODONE BITARTRATE AND ACETAMINOPHEN 10; 325 MG/1; MG/1
1 TABLET ORAL EVERY 4 HOURS
Status: DISCONTINUED
Start: 2023-04-04 | End: 2023-04-04 | Stop reason: DRUGHIGH

## 2023-04-04 RX ORDER — ONDANSETRON 4 MG/1
4 TABLET, ORALLY DISINTEGRATING ORAL
Status: ACTIVE
Start: 2023-04-04

## 2023-04-04 RX ORDER — ROSUVASTATIN CALCIUM 10 MG/1
10 TABLET, COATED ORAL EVERY MORNING
Status: ON HOLD
Start: 2023-02-02

## 2023-04-04 RX ORDER — GALCANEZUMAB 120 MG/ML
120 INJECTION, SOLUTION SUBCUTANEOUS
Status: ON HOLD
Start: 2023-01-09

## 2023-04-04 RX ORDER — LOPERAMIDE HYDROCHLORIDE 2 MG/1
2 CAPSULE ORAL AS NEEDED
Status: ON HOLD

## 2023-04-04 RX ORDER — SULFAMETHOXAZOLE AND TRIMETHOPRIM 800; 160 MG/1; MG/1
1 TABLET ORAL 2 TIMES DAILY
Status: ON HOLD
Start: 2023-04-01

## 2023-04-04 RX ORDER — POLYETHYLENE GLYCOL 3350 17 G/17G
17 POWDER, FOR SOLUTION ORAL DAILY PRN
Status: ACTIVE
Start: 2023-04-04

## 2023-04-04 RX ORDER — ONDANSETRON 2 MG/ML
4 INJECTION INTRAMUSCULAR; INTRAVENOUS
Status: COMPLETED
Start: 2023-04-04 | End: 2023-04-04

## 2023-04-04 RX ORDER — LIDOCAINE HYDROCHLORIDE 10 MG/ML
0.1 INJECTION, SOLUTION EPIDURAL; INFILTRATION; INTRACAUDAL; PERINEURAL AS NEEDED
Status: DISCONTINUED
Start: 2023-04-04 | End: 2023-04-04 | Stop reason: HOSPADM

## 2023-04-04 RX ORDER — ACETAMINOPHEN 650 MG/1
650 SUPPOSITORY RECTAL
Status: ACTIVE
Start: 2023-04-04

## 2023-04-04 RX ORDER — PROCHLORPERAZINE MALEATE 5 MG
10 TABLET ORAL
Status: ACTIVE
Start: 2023-04-04

## 2023-04-04 RX ORDER — METRONIDAZOLE 500 MG/100ML
500 INJECTION, SOLUTION INTRAVENOUS EVERY 12 HOURS
Status: DISPENSED
Start: 2023-04-04 | End: 2023-04-09

## 2023-04-04 RX ORDER — VENLAFAXINE HYDROCHLORIDE 225 MG/1
1 TABLET, EXTENDED RELEASE ORAL DAILY
Status: ON HOLD
Start: 2022-11-29

## 2023-04-04 RX ORDER — GABAPENTIN 300 MG/1
600 CAPSULE ORAL 3 TIMES DAILY
Status: DISPENSED
Start: 2023-04-04

## 2023-04-04 RX ORDER — CEFAZOLIN SODIUM 1 G/3ML
INJECTION, POWDER, FOR SOLUTION INTRAMUSCULAR; INTRAVENOUS AS NEEDED
Status: DISCONTINUED
Start: 2023-04-04 | End: 2023-04-04 | Stop reason: HOSPADM

## 2023-04-04 RX ORDER — SODIUM CHLORIDE 0.9 % (FLUSH) 0.9 %
5-40 SYRINGE (ML) INJECTION AS NEEDED
Status: DISPENSED
Start: 2023-04-04

## 2023-04-04 RX ORDER — LANOLIN ALCOHOL/MO/W.PET/CERES
400 CREAM (GRAM) TOPICAL DAILY
Status: ON HOLD

## 2023-04-04 RX ORDER — ONDANSETRON 2 MG/ML
4 INJECTION INTRAMUSCULAR; INTRAVENOUS
Status: ACTIVE
Start: 2023-04-04

## 2023-04-04 RX ORDER — LOPERAMIDE HYDROCHLORIDE 2 MG/1
2 CAPSULE ORAL
Status: ACTIVE
Start: 2023-04-04

## 2023-04-04 RX ORDER — HYDROMORPHONE HYDROCHLORIDE 1 MG/ML
.25-1 INJECTION, SOLUTION INTRAMUSCULAR; INTRAVENOUS; SUBCUTANEOUS
Status: DISCONTINUED
Start: 2023-04-04 | End: 2023-04-04 | Stop reason: HOSPADM

## 2023-04-04 RX ORDER — LORATADINE 10 MG/1
10 TABLET ORAL DAILY
Status: ON HOLD

## 2023-04-04 RX ORDER — MORPHINE SULFATE 15 MG/1
15 TABLET ORAL
Status: ON HOLD
Start: 2023-03-16

## 2023-04-04 RX ORDER — FENTANYL CITRATE 50 UG/ML
INJECTION, SOLUTION INTRAMUSCULAR; INTRAVENOUS AS NEEDED
Status: DISCONTINUED
Start: 2023-04-04 | End: 2023-04-04 | Stop reason: HOSPADM

## 2023-04-04 RX ORDER — ONDANSETRON 2 MG/ML
INJECTION INTRAMUSCULAR; INTRAVENOUS AS NEEDED
Status: DISCONTINUED
Start: 2023-04-04 | End: 2023-04-04 | Stop reason: HOSPADM

## 2023-04-04 RX ADMIN — SODIUM CHLORIDE, POTASSIUM CHLORIDE, SODIUM LACTATE AND CALCIUM CHLORIDE 100 ML/HR: 600; 310; 30; 20 INJECTION, SOLUTION INTRAVENOUS at 15:27

## 2023-04-04 RX ADMIN — LIDOCAINE HYDROCHLORIDE 100 MG: 20 INJECTION, SOLUTION EPIDURAL; INFILTRATION; INTRACAUDAL; PERINEURAL at 16:48

## 2023-04-04 RX ADMIN — MIDAZOLAM HYDROCHLORIDE 2 MG: 1 INJECTION, SOLUTION INTRAMUSCULAR; INTRAVENOUS at 16:39

## 2023-04-04 RX ADMIN — SODIUM CHLORIDE 500 ML: 9 INJECTION, SOLUTION INTRAVENOUS at 13:18

## 2023-04-04 RX ADMIN — MORPHINE SULFATE 4 MG: 4 INJECTION INTRAVENOUS at 13:29

## 2023-04-04 RX ADMIN — FENTANYL CITRATE 50 MCG: 50 INJECTION, SOLUTION INTRAMUSCULAR; INTRAVENOUS at 16:39

## 2023-04-04 RX ADMIN — CEFAZOLIN SODIUM 2 G: 1 POWDER, FOR SOLUTION INTRAMUSCULAR; INTRAVENOUS at 17:14

## 2023-04-04 RX ADMIN — SODIUM CHLORIDE, PRESERVATIVE FREE 10 ML: 5 INJECTION INTRAVENOUS at 23:07

## 2023-04-04 RX ADMIN — SODIUM CHLORIDE, POTASSIUM CHLORIDE, SODIUM LACTATE AND CALCIUM CHLORIDE 100 ML/HR: 600; 310; 30; 20 INJECTION, SOLUTION INTRAVENOUS at 19:00

## 2023-04-04 RX ADMIN — VANCOMYCIN HYDROCHLORIDE 2500 MG: 10 INJECTION, POWDER, LYOPHILIZED, FOR SOLUTION INTRAVENOUS at 20:54

## 2023-04-04 RX ADMIN — ONDANSETRON HYDROCHLORIDE 4 MG: 2 SOLUTION INTRAMUSCULAR; INTRAVENOUS at 17:35

## 2023-04-04 RX ADMIN — GABAPENTIN 600 MG: 300 CAPSULE ORAL at 22:12

## 2023-04-04 RX ADMIN — FENTANYL CITRATE 50 MCG: 50 INJECTION, SOLUTION INTRAMUSCULAR; INTRAVENOUS at 17:21

## 2023-04-04 RX ADMIN — ONDANSETRON 4 MG: 2 INJECTION INTRAMUSCULAR; INTRAVENOUS at 13:29

## 2023-04-04 RX ADMIN — MORPHINE SULFATE 15 MG: 15 TABLET ORAL at 20:53

## 2023-04-04 RX ADMIN — HYDROMORPHONE HYDROCHLORIDE 0.5 MG: 2 INJECTION, SOLUTION INTRAMUSCULAR; INTRAVENOUS; SUBCUTANEOUS at 17:30

## 2023-04-04 RX ADMIN — MORPHINE SULFATE 4 MG: 4 INJECTION INTRAVENOUS at 14:36

## 2023-04-04 RX ADMIN — PROPOFOL 200 MG: 10 INJECTION, EMULSION INTRAVENOUS at 16:48

## 2023-04-04 RX ADMIN — FAMOTIDINE 20 MG: 10 INJECTION INTRAVENOUS at 16:39

## 2023-04-04 RX ADMIN — DEXAMETHASONE SODIUM PHOSPHATE 8 MG: 4 INJECTION, SOLUTION INTRAMUSCULAR; INTRAVENOUS at 16:55

## 2023-04-04 RX ADMIN — LEVOFLOXACIN 500 MG: 5 INJECTION, SOLUTION INTRAVENOUS at 22:12

## 2023-04-04 RX ADMIN — METRONIDAZOLE 500 MG: 500 INJECTION, SOLUTION INTRAVENOUS at 22:12

## 2023-04-04 NOTE — ED PROVIDER NOTES
Chief Complaint   Patient presents with    Breast pain     This is a 63-year-old female with a history of breast cancer treated with radiation and chemotherapy which she completed, now receiving oral chemotherapy, referred here by her plastic surgeon for pain and increased drainage in her right breast.  She underwent bilateral mastectomy in May of last year and more recently had to have her expanders removed with insertion of breast implants several weeks ago (surgeon-Ирина) so that she could undergo MRI scans of her head and neck. She was doing well postsurgery but over the last 5 to 6 days has had some increasing pain in the right breast with thin, yellow drainage. There is some dehiscence at her incision site as well. She denies any active bleeding or purulent discharge but does note that the drainage has been substantial enough to saturate her dressings. She called her surgeon who told her to come in to be evaluated and likely go to the OR later today for removal.      Review of Systems   Constitutional:  Negative for fever. Respiratory:  Negative for cough and shortness of breath. Cardiovascular:  Positive for chest pain. Gastrointestinal:  Negative for abdominal pain and vomiting. Skin:  Positive for wound.        Past Medical History:   Diagnosis Date    Arthritis     b/l knees, hands,shoulders and feet    Chronic pain     headaches and knees    Depression     Fibromyalgia     Headache     Narcolepsy     Thromboembolus ([address]     RLE as a child after impact injury       Past Surgical History:   Procedure Laterality Date    ENDOMETRIAL CRYOABLATION      HX  SECTION      HX HEENT  1986    [place] Teeth removal    HX ORTHOPAEDIC Right 2017    Torn meniscus repair    HX ORTHOPAEDIC Right     Bone spur removal & scope of shoulder         Family History:   Problem Relation Age of Onset    Diabetes Mother         insulin    Cancer Mother         breast    Thyroid Disease Mother     Heart Disease Mother     Hypertension Father     Cancer Paternal Grandmother     Heart Disease Paternal Grandmother     Kidney Disease Paternal Grandfather        Social History     Socioeconomic History    Marital status:      Spouse name: Not on file    Number of children: Not on file    Years of education: Not on file    Highest education level: Not on file   Occupational History    Not on file   Tobacco Use    Smoking status: Never    Smokeless tobacco: Never   Substance and Sexual Activity    Alcohol use: No    Drug use: No    Sexual activity: Not Currently   Other Topics Concern    Not on file   Social History Narrative    Not on file     Social Determinants of Health     Financial Resource Strain: Not on file   Food Insecurity: Not on file   Transportation Needs: Not on file   Physical Activity: Not on file   Stress: Not on file   Social Connections: Not on file   Intimate Partner Violence: Not on file   Housing Stability: Not on file         ALLERGIES: Pcn [penicillins]      Vitals:    04/04/23 1215   BP: 121/76   Pulse: (!) 107   Resp: 20   Temp: 98 °F (36.7 °C)   SpO2: 97%   Weight: 112.5 kg (248 lb)   Height: 5' 6\" (1.676 m)       Physical exam  General:  Awake and alert, NAD  HEENT:  NC/AT, equal pupils  Breast:  +Surgical scar across the right breast with about a 3 mm circular defect indicating dehiscence at the incision site, there is serous drainage that has saturated her dressings. No purulent or expressible drainage that could be cultured.   Neck:   Normal inspection, full range of motion  Cardiac:  RRR, no murmurs  Respiratory:  Clear bilaterally, no wheezes, rales, rhonchi  Abdomen:  Soft and nondistended, nontender  Extremities: Warm and well perfused, no peripheral edema  Neuro:  Moving all extremities symmetrically without gross motor deficit  Skin:   See above      Recent Results (from the past 12 hour(s))   SAMPLES BEING HELD    Collection Time: 04/04/23  1:23 PM   Result Value Ref Range SAMPLES BEING HELD BL,RED,SST,GR     COMMENT        Add-on orders for these samples will be processed based on acceptable specimen integrity and analyte stability, which may vary by analyte. METABOLIC PANEL, COMPREHENSIVE    Collection Time: 04/04/23  1:23 PM   Result Value Ref Range    Sodium 141 136 - 145 mmol/L    Potassium 3.8 3.5 - 5.1 mmol/L    Chloride 111 (H) 97 - 108 mmol/L    CO2 26 21 - 32 mmol/L    Anion gap 4 (L) 5 - 15 mmol/L    Glucose 119 (H) 65 - 100 mg/dL    BUN 14 6 - 20 MG/DL    Creatinine 0.84 0.55 - 1.02 MG/DL    BUN/Creatinine ratio 17 12 - 20      eGFR >60 >60 ml/min/1.73m2    Calcium 9.1 8.5 - 10.1 MG/DL    Bilirubin, total 0.4 0.2 - 1.0 MG/DL    ALT (SGPT) 24 12 - 78 U/L    AST (SGOT) 18 15 - 37 U/L    Alk. phosphatase 119 (H) 45 - 117 U/L    Protein, total 7.0 6.4 - 8.2 g/dL    Albumin 3.6 3.5 - 5.0 g/dL    Globulin 3.4 2.0 - 4.0 g/dL    A-G Ratio 1.1 1.1 - 2.2     CBC WITH AUTOMATED DIFF    Collection Time: 04/04/23  1:23 PM   Result Value Ref Range    WBC 6.2 3.6 - 11.0 K/uL    RBC 4.10 3.80 - 5.20 M/uL    HGB 11.2 (L) 11.5 - 16.0 g/dL    HCT 35.5 35.0 - 47.0 %    MCV 86.6 80.0 - 99.0 FL    MCH 27.3 26.0 - 34.0 PG    MCHC 31.5 30.0 - 36.5 g/dL    RDW 18.4 (H) 11.5 - 14.5 %    PLATELET 148 966 - 918 K/uL    MPV 10.0 8.9 - 12.9 FL    NRBC 0.3 (H) 0  WBC    ABSOLUTE NRBC 0.02 (H) 0.00 - 0.01 K/uL    NEUTROPHILS 72 32 - 75 %    LYMPHOCYTES 13 12 - 49 %    MONOCYTES 9 5 - 13 %    EOSINOPHILS 5 0 - 7 %    BASOPHILS 1 0 - 1 %    IMMATURE GRANULOCYTES 0 0.0 - 0.5 %    ABS. NEUTROPHILS 4.5 1.8 - 8.0 K/UL    ABS. LYMPHOCYTES 0.8 0.8 - 3.5 K/UL    ABS. MONOCYTES 0.5 0.0 - 1.0 K/UL    ABS. EOSINOPHILS 0.3 0.0 - 0.4 K/UL    ABS. BASOPHILS 0.0 0.0 - 0.1 K/UL    ABS. IMM. GRANS. 0.0 0.00 - 0.04 K/UL    DF AUTOMATED        No results found. Procedures - none unless documented below      ED course: Labs reviewed. I changed her dressing at the bedside, exam as per above.   No active drainage for me to culture at the opening. Discussed with her plastic surgeon Tommie Vaughn) who advised holding off on antimicrobials for now, will take her to the OR for urgent removal of the implant. IV morphine given for analgesia.     Impression: Right breast pain, post-operative complication, wound dehiscence  Disposition: Admitted to OR

## 2023-04-04 NOTE — ANESTHESIA PREPROCEDURE EVALUATION
Relevant Problems   No relevant active problems       Anesthetic History   No history of anesthetic complications            Review of Systems / Medical History  Patient summary reviewed and pertinent labs reviewed    Pulmonary  Within defined limits                 Neuro/Psych         Psychiatric history     Cardiovascular                  Exercise tolerance: >4 METS     GI/Hepatic/Renal  Within defined limits              Endo/Other        Arthritis and cancer     Other Findings   Comments: H/o DVT  fibromyalgia           Physical Exam    Airway  Mallampati: II  TM Distance: 4 - 6 cm  Neck ROM: normal range of motion   Mouth opening: Normal     Cardiovascular  Regular rate and rhythm,  S1 and S2 normal,  no murmur, click, rub, or gallop  Rhythm: regular  Rate: normal         Dental  No notable dental hx       Pulmonary  Breath sounds clear to auscultation               Abdominal  GI exam deferred       Other Findings            Anesthetic Plan    ASA: 2  Anesthesia type: general          Induction: Intravenous  Anesthetic plan and risks discussed with: Patient

## 2023-04-04 NOTE — H&P
Pre-op History and Physical    CC: R breast implant exposure  HPI: 63y.o. year old female with history of R breast cancer and BRCA+ gene mutation, one month s/p b/l TE to implant exchange, presents with R breast implant exposure. Patient had finished radiation therapy in mid February and had concerns for metastatic disease to the brain; MRI was recommended. Due to the metal presence in her expanders, she required expander to implant exchange. She began compressive therapy for lymphedema on Friday evening and noted drainage from her incision Saturday morning. She presents today for evaluation.      .  Past medical history:   Past Medical History:   Diagnosis Date    Arthritis     b/l knees, hands,shoulders and feet    Chronic pain     headaches and knees    Depression     Fibromyalgia     Headache(784.0)     Narcolepsy     Thromboembolus ([address]     RLE as a child after impact injury      Past surgical history:   Past Surgical History:   Procedure Laterality Date    ENDOMETRIAL CRYOABLATION      HX  SECTION      HX HEENT  1986    [place] Teeth removal    HX ORTHOPAEDIC Right 2017    Torn meniscus repair    HX ORTHOPAEDIC Right     Bone spur removal & scope of shoulder    WA UNLISTED PROCEDURE BREAST Bilateral     expanders taken out for MRI, with implants incerted      Family history:   Family History   Problem Relation Age of Onset    Diabetes Mother         insulin    Cancer Mother         breast    Thyroid Disease Mother     Heart Disease Mother     Hypertension Father     Cancer Paternal Grandmother     Heart Disease Paternal Grandmother     Kidney Disease Paternal Grandfather       Social history:   Social History     Socioeconomic History    Marital status:      Spouse name: Not on file    Number of children: Not on file    Years of education: Not on file    Highest education level: Not on file   Occupational History    Not on file   Tobacco Use    Smoking status: Never    Smokeless tobacco: Never   Vaping Use    Vaping Use: Not on file   Substance and Sexual Activity    Alcohol use: No    Drug use: No    Sexual activity: Not Currently   Other Topics Concern    Not on file   Social History Narrative    Not on file     Social Determinants of Health     Financial Resource Strain: Not on file   Food Insecurity: Not on file   Transportation Needs: Not on file   Physical Activity: Not on file   Stress: Not on file   Social Connections: Not on file   Intimate Partner Violence: Not on file   Housing Stability: Not on file      Home Medications:   Prior to Admission medications    Medication Sig Start Date End Date Taking? Authorizing Provider   amLODIPine (NORVASC) 5 mg tablet Take 5 mg by mouth daily. 3/31/23  Yes Provider, Historical   Venlafaxine-ER 24 HR (EFFEXOR-ER) 225 mg tablet Take 1 Tablet by mouth daily. 11/29/22  Yes Provider, Historical   rosuvastatin (CRESTOR) 10 mg tablet Take 10 mg by mouth Every morning. 2/2/23  Yes Provider, Historical   lidocaine/prilocaine (EMLA EX) by Apply Externally route. Apply one hour prior to port access   Yes Provider, Historical   loratadine (Claritin) 10 mg tablet Take 10 mg by mouth daily. Indications: prevention of bone pain from cancer treatment   Yes Provider, Historical   HYDROcodone-acetaminophen (NORCO)  mg tablet Take 1 Tab by mouth every four (4) hours. Max Daily Amount: 6 Tabs. 9/26/17  Yes Jeffry Echols NP   methylphenidate HCl (RITALIN) 10 mg tablet Take 10 mg by mouth three (3) times daily as needed. Yes Provider, Historical   methylphenidate HCl 40 mg BP50 Take 40 mg by mouth every morning. Yes Provider, Historical   gabapentin (NEURONTIN) 600 mg tablet Take 1 Tab by mouth three (3) times daily. 7/19/17  Yes Lindsay Prince MD   Emgality Pen 120 mg/mL injection 120 mg by SubCUTAneous route every twenty-eight (28) days.  1/9/23   Provider, Historical   cyclobenzaprine (FLEXERIL) 10 mg tablet Take 1 Tab by mouth two (2) times daily as needed for Muscle Spasm(s). 9/26/17   Rachna Echols NP   naloxone [facility]) 4 mg/actuation nasal spray 1 [place] by IntraNASal route as needed for Other (Respiratory depression). Give single spray into one nostril. Call 911. Give doses every 2 to 3 minutes, alternating nostrils, until assistance arrives using a new nasal spray with each dose, if patient does not respond or responds and then relapses 9/26/17   Rachna Echols NP   methocarbamol (ROBAXIN-750) 750 mg tablet Take 750 mg by mouth four (4) times daily. Provider, Historical   cholecalciferol, vitamin D3, (VITAMIN D3) 2,000 unit tab Take 1 Tab by mouth daily. Provider, Historical   CALCIUM CARBONATE PO Take 750 mg by mouth daily. Provider, Historical   SUMAtriptan (IMITREX) 6 mg/0.5 mL injection 6 mg by SubCUTAneous route as needed. Provider, Historical   docusate sodium (COLACE) 100 mg capsule Take 100 mg by mouth two (2) times a day. Provider, Historical   adapalene (DIFFERIN) 0.1 % topical gel Apply  to affected area nightly. use small amount as directed    Provider, Historical   buPROPion XL (WELLBUTRIN XL) 300 mg XL tablet Take 300 mg by mouth every morning. Provider, Historical   alprazolam [facility]) 1 mg tablet Take 1 mg by mouth nightly as needed. Provider, Historical   valacyclovir (VALTREX) 500 mg tablet Take 500 mg by mouth daily. Provider, Historical   naratriptan (AMERGE) 2.5 mg tablet Take 2.5 mg by mouth once as needed. 2.5 mg at onset of headache, may repeat in 4 hours if needed     Provider, Historical   ondansetron (ZOFRAN ODT) 8 mg disintegrating tablet Take 8 mg by mouth every eight (8) hours as needed. Provider, Historical      Allergies:    Allergies   Allergen Reactions    Pcn [Penicillins] Hives, Shortness of Breath and Swelling     Itch  Pt has tolerated ancef in past      Review of systems:  Denies headache, fever, chills, weight change, congestion, sore throat, chest pain, shortness of breath, nausea, vomiting, diarrhea, constipation, abdominal pain, generalized weakness, muscle or joint pain, and rash. Physical Exam:  Vitals: Blood pressure 110/65, pulse 89, temperature 98.5 °F (36.9 °C), resp. rate 19, height 5' 6\" (1.676 m), weight 112.5 kg (248 lb), last menstrual period 09/10/2017, SpO2 98 %.    General: awake and alert, NAD  Neck: supple  Cor: RRR  Lungs: clear  Abdomen: soft, non-tender, non-distended  Extremities: no edema  Skin: no rash  Breast: R breast with no eythema; however, with 3 mm sized area of implant exposed    Impression: 62 yo F with R breast implant exposure    Plan:    - to OR for removal R breast implant  - will undergo autologous tissue reconstruction in August/September  - will obtain CT angio of abd/pel during this hospitalization for free flap planning  - cx to be taken during surgery  - broad spec abx per medicine  - admit to medicine, appreciate recs    Will continue to follow [phone]

## 2023-04-04 NOTE — PERIOP NOTES
TRANSFER - OUT REPORT:    Verbal report given to Na Harris RN on Enedina Woo  being transferred to [phone] for routine post - op       Report consisted of patients Situation, Background, Assessment and   Recommendations(SBAR). Information from the following report(s) SBAR, Intake/Output, and MAR was reviewed with the receiving nurse. Lines:   Venous Access Device Upper chest (subclavicular area), left (Active)   Central Line Being Utilized No 04/04/23 1809   Criteria for Appropriate Use Irritant/vesicant medication 04/04/23 1655   Site Assessment Clean, dry, & intact 04/04/23 1655       Peripheral IV 04/04/23 Left Antecubital (Active)   Site Assessment Clean, dry, & intact 04/04/23 1809   Phlebitis Assessment 0 04/04/23 1809   Infiltration Assessment 0 04/04/23 1809   Dressing Status Clean, dry, & intact 04/04/23 1809   Dressing Type Tape;Transparent 04/04/23 1809   Hub Color/Line Status Pink; Infusing 04/04/23 1809   Action Taken Open ports on tubing capped 04/04/23 1809   Alcohol Cap Used Yes 04/04/23 1809       Peripheral IV 04/04/23 Left Hand (Active)   Site Assessment Clean, dry, & intact 04/04/23 1809   Phlebitis Assessment 0 04/04/23 1809   Infiltration Assessment 0 04/04/23 1809   Dressing Status Clean, dry, & intact 04/04/23 1809   Dressing Type Tape;Transparent 04/04/23 1809   Hub Color/Line Status Green;Capped 04/04/23 1809   Action Taken Open ports on tubing capped 04/04/23 1809   Alcohol Cap Used Yes 04/04/23 1809        Opportunity for questions and clarification was provided.       Patient transported with:   Registered Nurse

## 2023-04-04 NOTE — ED TRIAGE NOTES
Patient arrives to the ED via POV with complaints of right breast pain. Patient had expanders and breast implants placed. Patient reports operation site opened and is leaking fluid. PMH of breast cancer.

## 2023-04-04 NOTE — BRIEF OP NOTE
Brief Postoperative Note    Patient: Kenneth Sosa  YOB: 1960  MRN: [id number]    Date of Procedure: 4/4/2023     Pre-Op Diagnosis: RIGHT BREAST INFECTION    Post-Op Diagnosis: Same as preoperative diagnosis.       Procedure(s):  RIGHT TISSUE EXPANDER REMOVAL, capsulectomy    Surgeon(s):  Litzy Murillo MD    Surgical Assistant: Surg Asst-1: Jesse Deluca    Anesthesia: General     Estimated Blood Loss (mL): less than 50     Complications: None    Specimens:   ID Type Source Tests Collected by Time Destination   1 : right mastectomy scar Preservative Breast  Litzy Murillo MD 4/4/2023 1710 Pathology   2 : right breast capsule Preservative Breast  Litzy Murillo MD 4/4/2023 1718 Pathology   1 : right breast culture Wound Breast CULTURE, ANAEROBIC, CULTURE, WOUND W Twila Hale MD 4/4/2023 1714 Microbiology        Implants: * No implants in log *    Drains: * No LDAs found *    Findings: right breast capsule with areas of thickening, full capsulectomy performed    Electronically Signed by Guillermina Frias MD on 4/4/2023 at 5:48 PM

## 2023-04-05 ENCOUNTER — APPOINTMENT (OUTPATIENT)
Dept: CT IMAGING | Age: 63
End: 2023-04-05
Attending: SURGERY
Payer: MEDICAID

## 2023-04-05 LAB
ANION GAP SERPL CALC-SCNC: 3 MMOL/L (ref 5–15)
BACTERIA SPEC CULT: ABNORMAL
BACTERIA SPEC CULT: NORMAL
BUN SERPL-MCNC: 12 MG/DL (ref 6–20)
BUN/CREAT SERPL: 15 (ref 12–20)
CALCIUM SERPL-MCNC: 8.6 MG/DL (ref 8.5–10.1)
CHLORIDE SERPL-SCNC: 110 MMOL/L (ref 97–108)
CO2 SERPL-SCNC: 24 MMOL/L (ref 21–32)
CREAT SERPL-MCNC: 0.8 MG/DL (ref 0.55–1.02)
ERYTHROCYTE [DISTWIDTH] IN BLOOD BY AUTOMATED COUNT: 18.2 % (ref 11.5–14.5)
EST. AVERAGE GLUCOSE BLD GHB EST-MCNC: 134 MG/DL
GLUCOSE BLD STRIP.AUTO-MCNC: 108 MG/DL (ref 65–117)
GLUCOSE BLD STRIP.AUTO-MCNC: 154 MG/DL (ref 65–117)
GLUCOSE BLD STRIP.AUTO-MCNC: 178 MG/DL (ref 65–117)
GLUCOSE SERPL-MCNC: 206 MG/DL (ref 65–100)
GRAM STN SPEC: ABNORMAL
GRAM STN SPEC: ABNORMAL
HBA1C MFR BLD: 6.3 % (ref 4–5.6)
HCT VFR BLD AUTO: 34.6 % (ref 35–47)
HGB BLD-MCNC: 10.9 G/DL (ref 11.5–16)
MCH RBC QN AUTO: 27.1 PG (ref 26–34)
MCHC RBC AUTO-ENTMCNC: 31.5 G/DL (ref 30–36.5)
MCV RBC AUTO: 86.1 FL (ref 80–99)
NRBC # BLD: 0.02 K/UL (ref 0–0.01)
NRBC BLD-RTO: 0.4 PER 100 WBC
PLATELET # BLD AUTO: 171 K/UL (ref 150–400)
PMV BLD AUTO: 9.6 FL (ref 8.9–12.9)
POTASSIUM SERPL-SCNC: 4.7 MMOL/L (ref 3.5–5.1)
RBC # BLD AUTO: 4.02 M/UL (ref 3.8–5.2)
SERVICE CMNT-IMP: ABNORMAL
SERVICE CMNT-IMP: NORMAL
SERVICE CMNT-IMP: NORMAL
SODIUM SERPL-SCNC: 137 MMOL/L (ref 136–145)
WBC # BLD AUTO: 5.1 K/UL (ref 3.6–11)

## 2023-04-05 PROCEDURE — 36415 COLL VENOUS BLD VENIPUNCTURE: CPT

## 2023-04-05 PROCEDURE — 74011250636 HC RX REV CODE- 250/636: Performed by: HOSPITALIST

## 2023-04-05 PROCEDURE — 2709999900 HC NON-CHARGEABLE SUPPLY

## 2023-04-05 PROCEDURE — 87040 BLOOD CULTURE FOR BACTERIA: CPT

## 2023-04-05 PROCEDURE — 99221 1ST HOSP IP/OBS SF/LOW 40: CPT | Performed by: CLINICAL NURSE SPECIALIST

## 2023-04-05 PROCEDURE — 82962 GLUCOSE BLOOD TEST: CPT

## 2023-04-05 PROCEDURE — 74011000636 HC RX REV CODE- 636: Performed by: INTERNAL MEDICINE

## 2023-04-05 PROCEDURE — 74011250636 HC RX REV CODE- 250/636: Performed by: INTERNAL MEDICINE

## 2023-04-05 PROCEDURE — 74174 CTA ABD&PLVS W/CONTRAST: CPT

## 2023-04-05 PROCEDURE — 74011250637 HC RX REV CODE- 250/637: Performed by: INTERNAL MEDICINE

## 2023-04-05 PROCEDURE — 85027 COMPLETE CBC AUTOMATED: CPT

## 2023-04-05 PROCEDURE — 65270000029 HC RM PRIVATE

## 2023-04-05 PROCEDURE — 74011000250 HC RX REV CODE- 250: Performed by: HOSPITALIST

## 2023-04-05 PROCEDURE — 83036 HEMOGLOBIN GLYCOSYLATED A1C: CPT

## 2023-04-05 PROCEDURE — 80048 BASIC METABOLIC PNL TOTAL CA: CPT

## 2023-04-05 PROCEDURE — 74011250637 HC RX REV CODE- 250/637: Performed by: HOSPITALIST

## 2023-04-05 RX ORDER — INSULIN LISPRO 100 [IU]/ML
INJECTION, SOLUTION INTRAVENOUS; SUBCUTANEOUS
Status: ACTIVE
Start: 2023-04-05

## 2023-04-05 RX ORDER — DEXTROSE MONOHYDRATE 100 MG/ML
0-250 INJECTION, SOLUTION INTRAVENOUS AS NEEDED
Status: ACTIVE
Start: 2023-04-05

## 2023-04-05 RX ORDER — LEVOFLOXACIN 5 MG/ML
750 INJECTION, SOLUTION INTRAVENOUS EVERY 24 HOURS
Status: DISPENSED
Start: 2023-04-05 | End: 2023-04-10

## 2023-04-05 RX ORDER — IBUPROFEN 200 MG
4 TABLET ORAL AS NEEDED
Status: ACTIVE
Start: 2023-04-05

## 2023-04-05 RX ADMIN — METRONIDAZOLE 500 MG: 500 INJECTION, SOLUTION INTRAVENOUS at 09:05

## 2023-04-05 RX ADMIN — BENZOCAINE AND MENTHOL 1 LOZENGE: 15; 3.6 LOZENGE ORAL at 23:03

## 2023-04-05 RX ADMIN — GABAPENTIN 600 MG: 300 CAPSULE ORAL at 21:28

## 2023-04-05 RX ADMIN — SODIUM CHLORIDE, PRESERVATIVE FREE 10 ML: 5 INJECTION INTRAVENOUS at 14:06

## 2023-04-05 RX ADMIN — VENLAFAXINE HYDROCHLORIDE 225 MG: 150 CAPSULE, EXTENDED RELEASE ORAL at 09:13

## 2023-04-05 RX ADMIN — GABAPENTIN 600 MG: 300 CAPSULE ORAL at 16:31

## 2023-04-05 RX ADMIN — IOPAMIDOL 100 ML: 755 INJECTION, SOLUTION INTRAVENOUS at 16:21

## 2023-04-05 RX ADMIN — GABAPENTIN 600 MG: 300 CAPSULE ORAL at 09:05

## 2023-04-05 RX ADMIN — HYDROCODONE BITARTRATE AND ACETAMINOPHEN 1 TABLET: 10; 325 TABLET ORAL at 12:14

## 2023-04-05 RX ADMIN — HYDROCODONE BITARTRATE AND ACETAMINOPHEN 1 TABLET: 10; 325 TABLET ORAL at 18:17

## 2023-04-05 RX ADMIN — METRONIDAZOLE 500 MG: 500 INJECTION, SOLUTION INTRAVENOUS at 21:27

## 2023-04-05 RX ADMIN — HYDROCODONE BITARTRATE AND ACETAMINOPHEN 1 TABLET: 10; 325 TABLET ORAL at 23:04

## 2023-04-05 RX ADMIN — ROSUVASTATIN CALCIUM 10 MG: 10 TABLET, FILM COATED ORAL at 09:05

## 2023-04-05 RX ADMIN — VANCOMYCIN HYDROCHLORIDE 1000 MG: 1 INJECTION, POWDER, LYOPHILIZED, FOR SOLUTION INTRAVENOUS at 09:14

## 2023-04-05 RX ADMIN — SODIUM CHLORIDE, PRESERVATIVE FREE 10 ML: 5 INJECTION INTRAVENOUS at 21:27

## 2023-04-05 RX ADMIN — AMLODIPINE BESYLATE 5 MG: 5 TABLET ORAL at 09:05

## 2023-04-05 RX ADMIN — SODIUM CHLORIDE, PRESERVATIVE FREE 10 ML: 5 INJECTION INTRAVENOUS at 05:39

## 2023-04-05 RX ADMIN — VANCOMYCIN HYDROCHLORIDE 1000 MG: 1 INJECTION, POWDER, LYOPHILIZED, FOR SOLUTION INTRAVENOUS at 21:27

## 2023-04-05 RX ADMIN — LEVOFLOXACIN 750 MG: 5 INJECTION, SOLUTION INTRAVENOUS at 21:28

## 2023-04-05 RX ADMIN — BENZOCAINE AND MENTHOL 1 LOZENGE: 15; 3.6 LOZENGE ORAL at 15:21

## 2023-04-05 NOTE — ANESTHESIA POSTPROCEDURE EVALUATION
Procedure(s):  RIGHT TISSUE EXPANDER REMOVAL, capsulectomy. general    Anesthesia Post Evaluation      Multimodal analgesia: multimodal analgesia not used between 6 hours prior to anesthesia start to PACU discharge  Patient location during evaluation: PACU  Patient participation: complete - patient participated  Level of consciousness: awake  Pain management: adequate  Airway patency: patent  Anesthetic complications: no  Cardiovascular status: acceptable, blood pressure returned to baseline and hemodynamically stable  Respiratory status: acceptable  Hydration status: acceptable  Post anesthesia nausea and vomiting:  controlled      INITIAL Post-op Vital signs:   Vitals Value Taken Time   /96 04/04/23 1931   Temp 36.5 °C (97.7 °F) 04/04/23 1800   Pulse 86 04/04/23 1931   Resp 18 04/04/23 1931   SpO2 95 % 04/04/23 1931   Vitals shown include unvalidated device data.

## 2023-04-05 NOTE — PROGRESS NOTES
Plastic Surgery Progress Note     POD 1 s/p R implant removal     No issues overnight. Reports pain is controlled on oral medications. Patient Vitals for the past 24 hrs:   Temp Pulse Resp BP SpO2   04/05/23 0730 97.8 °F (36.6 °C) 81 14 110/66 96 %   04/05/23 0400 97.8 °F (36.6 °C) 78 12 117/75 95 %   04/05/23 0059 98.2 °F (36.8 °C) 81 16 131/64 98 %   04/04/23 2200 98 °F (36.7 °C) 90 18 118/76 98 %   04/04/23 2100 97.6 °F (36.4 °C) 92 18 114/78 97 %   04/04/23 2000 97.8 °F (36.6 °C) 90 18 110/76 96 %   04/04/23 1920 -- 88 15 106/73 95 %   04/04/23 1905 -- 88 20 111/64 95 %   04/04/23 1845 -- 99 30 129/67 100 %   04/04/23 1830 -- 96 16 (!) 131/99 99 %   04/04/23 1815 -- 87 12 133/82 99 %   04/04/23 1812 -- 81 12 101/85 100 %   04/04/23 1807 -- 78 16 115/69 100 %   04/04/23 1803 -- 79 15 105/65 100 %   04/04/23 1801 -- 80 15 108/67 100 %   04/04/23 1800 97.7 °F (36.5 °C) 80 11 100/62 100 %   04/04/23 1616 98.5 °F (36.9 °C) 89 19 110/65 98 %   04/04/23 1215 98 °F (36.7 °C) (!) 107 20 121/76 97 %      Date 04/04/23 0700 - 04/05/23 0659 04/05/23 0700 - 04/06/23 0659   Shift 2626-0867 7910-3873 24 Hour Total 0615-9188 5779-8701 24 Hour Total   INTAKE   I.V.(mL/kg/hr) 1000(0.7)  1000(0.4)        Volume (lactated Ringers infusion) 1000  1000      Shift Total(mL/kg) 1000(8.9)  1000(8.9)      OUTPUT   Drains  40 40 10  10     Output (ml) (Camilo-Cotto Drain 04/04/23 Right Breast)  40 40 10  10   Shift Total(mL/kg)  40(0.4) 40(0.4) 10(0.1)  10(0.1)   NET 1000 -40 960 -10  -10   Weight (kg) 112.5 112.5 112.5 112.5 112.5 112.5      Gen: NAD, comfortable  HEENT: NCAT  CV: reg rate and rhythm  Resp: normal resp effort RA  Breast: R breast incision c/d/I, no erythema.  Drain with s/s drainage    Date 04/04/23 0700 - 04/05/23 0659 04/05/23 0700 - 04/06/23 0659   Shift 8537-0416 9052-9207 24 Hour Total 5862-9494 4705-5292 24 Hour Total   INTAKE   I.V.(mL/kg/hr) 1000(0.7)  1000(0.4)        Volume (lactated Ringers infusion) 1000 1000      Shift Total(mL/kg) 1000(8.9)  1000(8.9)      OUTPUT   Drains  40 40 10  10     Output (ml) (Camilo-Cotto Drain 04/04/23 Right Breast)  40 40 10  10   Shift Total(mL/kg)  40(0.4) 40(0.4) 10(0.1)  10(0.1)   NET 1000 -40 960 -10  -10   Weight (kg) 112.5 112.5 112.5 112.5 112.5 112.5      63 yo F POD 1 s/p R implant removal d/t exposure     - fu cultures (no growth)  - abx per ID  - CT angio abd/pelvis for free flap planning performed  - continue LAMONTE drain, likely will d/c prior to discharge  - dispo pending cultures, abx plan.  Ultimately will need autologous reconstruction     Please do not hesitate to call with questions  [phone]

## 2023-04-05 NOTE — PROGRESS NOTES
Ultrasound IV by Guillermo Tolbert RN :  Procedure Note    Ultrasound IV education provided to patient. Opportunities for questions given. Patient reports her current IV in [address] [place] [address] is constantly beeping. Limb alert RUE. Ultrasound used for PIV placement:  20 gauge 8 cm Arrow Endurance Extended Dwell(may stay in place for 29 days)  Left cephalic location. 1 X Attempt(s). Flushed with ease; vigorous blood return. Procedure tolerated well. Primary RN aware of IV placement and added to LDA.      Guillermo Tolbert RN

## 2023-04-05 NOTE — PROGRESS NOTES
Problem: Falls - Risk of  Goal: *Absence of Falls  Description: Document Gregorio Alvarez Fall Risk and appropriate interventions in the flowsheet.   Outcome: Progressing Towards Goal  Note: Fall Risk Interventions:                                Problem: Patient Education: Go to Patient Education Activity  Goal: Patient/Family Education  Outcome: Progressing Towards Goal

## 2023-04-05 NOTE — PROGRESS NOTES
[facility] Dosing Services: Antimicrobial Stewardship Daily Doc    Consult for antibiotic dosing of vancomycin by Dr. Tessa Foreman  Indication: cellulitis  Day of Therapy: 2    Vancomycin therapy:  Current maintenance dose: vancomycin 1,000 mg IV every 12 hours   Dose calculated to approximate a target AUC/KEON of 400-600  Assessment/Plan: Afebrile; WBCs WNL; SCr stable. Continue current regimen. Random level ordered with morning labs on 04/06/2023. Non-Kinetic Antimicrobial Dosing Regimen:   Current Regimen:  Levofloxacin 500 mg IV daily x 5 days for SSTI  Assessment/Plan: CrCl 95 mL/min. Usual dose for SSTI is 750 mg IV every 24 hours for 5 to 14 days. Will change Levofloxacin to 750 mg IV every 24 hours x 5 days. Other Antimicrobial   (not dosed by pharmacist) Metronidazole 500 mg IV every 12 hours   Cultures 04/05/2023 Blood: in process  04/04/2023 Surgical specimen: in process  04/04/2023 Surgical specimen (for anaerobes): in process   Serum Creatinine Lab Results   Component Value Date/Time    Creatinine 0.80 04/05/2023 02:48 AM       Creatinine Clearance Estimated Creatinine Clearance: 95.2 mL/min (based on SCr of 0.8 mg/dL). Temp Temp: 98.1 °F (36.7 °C)       WBC Lab Results   Component Value Date/Time    WBC 5.1 04/05/2023 02:48 AM      Procalcitonin No results found for: PCT     For Antifungals, Metronidazole and Nafcillin: Lab Results   Component Value Date/Time    ALT (SGPT) 24 04/04/2023 01:23 PM    AST (SGOT) 18 04/04/2023 01:23 PM    Alk.  phosphatase 119 (H) 04/04/2023 01:23 PM    Bilirubin, total 0.4 04/04/2023 01:23 PM        Pharmacist Bernice Kelsey

## 2023-04-05 NOTE — PROGRESS NOTES
[facility] Progress Note: Antimicrobial Stewardship    Consult for antibiotic dosing of vancomycin by Dr. Saravanan Roca  Indication: cellulitis  Day of Therapy: 1    Plan:  Vancomycin therapy:   Start with loading dose of vancomycin 2,500 mg IV (25 mg/kg, max 2.5 gm)   Follow with maintenance dose of vancomycin 1,000 mg IV every 12 hours    Dose calculated to approximate a target AUC/KEON of 400-600  Pharmacist will follow daily and will make changes to dose and/or frequency based on clinical status. Serum Creatinine     Lab Results   Component Value Date/Time    Creatinine 0.84 04/04/2023 01:23 PM       Creatinine Clearance Estimated Creatinine Clearance: 90.6 mL/min (based on SCr of 0.84 mg/dL). Procalcitonin  No results found for: PCT     Temp   97.7 °F (36.5 °C)    WBC   Lab Results   Component Value Date/Time    WBC 6.2 04/04/2023 01:23 PM       For Antifungals, Metronidazole and Nafcillin: Lab Results   Component Value Date/Time    ALT (SGPT) 24 04/04/2023 01:23 PM    AST (SGOT) 18 04/04/2023 01:23 PM    Alk.  phosphatase 119 (H) 04/04/2023 01:23 PM    Bilirubin, total 0.4 04/04/2023 01:23 PM         Pharmacist: Arabella Mackey

## 2023-04-05 NOTE — PROGRESS NOTES
Bedside shift change report given to [address] (oncoming nurse) by Alicia Mondragon (offgoing nurse). Report included the following information SBAR, Intake/Output, MAR, and Recent Results.

## 2023-04-05 NOTE — DIABETES MGMT
[facility]  DIABETES MANAGEMENT CONSULT    Consulted by Provider for advanced nursing evaluation and care for inpatient blood glucose management. Evaluation and Action Plan   This 63year old AA female was admitted with wound dehiscence. Receiving wound care & antibiotics. As for BG management, patient reports a pre-diabetes diagnosis pre-COVID pandemic & short-term treatment with metformin. Med subsequently stopped. No further diabetes-related intervention as an OP. Has received steroids in combination with OP chemo; chemo completed last year. There is an A1c in process which will provide more recent information regarding glucose metabolism. In the meantime, agree with HIGH sensitivity corrective approach. Management Rationale Action Plan   Medication   Basal needs  NA   Nutritional needs  NA   Corrective insulin Using HIGH sensitivity    Overriding steroid effect  Only one dose of dexamethasone to date   Lab [x]       Hemoglobin A1c     Additional orders             Initial Presentation   Lang Ivory is a 63 y.o. female admitted from the ER 4/4/23 after experiencing pain and increased drainage from right breast. Wound dehiscence+. History of bilateral mastectomy May 2022 followed by insertion of breast implants (after removal of expanders) more recently. Finished radiation+. Normal vital signs  LAB: WBC & CMP normal    HX:   Past Medical History:   Diagnosis Date    Arthritis     b/l knees, hands,shoulders and feet    Chronic pain     headaches and knees    Depression     Fibromyalgia     Headache(784.0)     Narcolepsy     Thromboembolus (HCC)     RLE as a child after impact injury      INITIAL DX:   Wound dehiscence [T81.30XA]     Current Treatment     TX: Abx. BP & pain meds. Insulin. Crestor. Effexor    Hospital Course   Clinical progress has been uncomplicated.      Diabetes History   Prediabetes diagnosis pre-COVID pandemic 2020; treated with metformin X3 months => normalized A1c per patient => metformin stopped per provider  No blood transfusions for the past 3 months  Strong family history of diabetes in parents and multiple extended family  A1c 2017 5.8%    Immunization History   Administered Date(s) Administered    Influenza, FLUARIX, FLULAVAL, FLUZONE (age 10 mo+) AND AFLURIA, (age 1 y+), PF, 0.5mL 09/26/2017   [] Pneumonia: There is no immunization history for the selected administration types on file for this patient. [] Hepatitis: There is no immunization history for the selected administration types on file for this patient. Subjective   I had elevated numbers and was put on metformin for 3 months.      Objective   Physical exam  General Obese female in no acute distress. Conversant and cooperative  Neuro  Alert, oriented   Vital Signs Visit Vitals  /69 (BP 1 Location: Left upper arm, BP Patient Position: Lying; At rest)   Pulse 88   Temp 98.1 °F (36.7 °C)   Resp 16   Ht 5' 6\" (1.676 m)   Wt 112.5 kg (248 lb)   SpO2 96%   BMI 40.03 kg/m²     Laboratory  Recent Labs     04/05/23  0248 04/04/23  1323   * 119*   AGAP 3* 4*   WBC 5.1 6.2   CREA 0.80 0.84   AST  --  18   ALT  --  24     Factors impacting BG management  Factor Dose Comments   Nutrition:  Regular meals    Nothing documented   Drugs:  Steroids  Atypical antipsychotics   Dexamethasone 8mg X1 (4/4/23)  Effexor-XR   Impairs insulin action  Weight gain increases insulin resistance   Pain Scheduled Neurontin   Norco PRN    Infection Levaquin Q24 hrs  Flagyl Q12 hrs  Vanc Q12 hrs    Other:   Kidney function  Liver function   Normal   Normal      Blood glucose pattern    Significant diabetes-related events over the past 24-72 hours  4/4/23 Admission  => Dex 8mg X1  4/5/23  => 154 without intervention    Assessment and Nursing Intervention   Nursing Diagnosis Risk for unstable blood glucose pattern   Nursing Intervention Domain 7443 Decision-making Support   Nursing Interventions Examined current inpatient diabetes/blood glucose control   Explored factors facilitating and impeding inpatient management  Explored corrective strategies with patient and responsible inpatient provider   Informed patient of rational for insulin strategy while hospitalized     Nursing Diagnosis 08406 Ineffective Health Management   Nursing Intervention Domain 5250 Decision-making Support   Nursing Interventions Discussed prevention of diabetes strategies including 7% weight loss (goal weight 225 lbs.) and 150 minutes of physical activity per week     Billing Code(s)   [] 43719 IP subsequent hospital care - 50 minutes   [] 99182 IP subsequent hospital care - 35 minutes   [] [phone] IP subsequent hospital care - 25 minutes   [x] 63368 IP initial hospital care - 40 minutes     Before making these care recommendations, I personally reviewed the hospitalization record, including notes, laboratory & diagnostic data and current medications, and examined the patient at the bedside (circumstances permitting) before determining care. More than fifty (50) percent of the time was spent in patient counseling and/or care coordination.   Total minutes: LEN Adams 55, CNS  Diabetes Clinical Nurse Specialist  [facility]  Access via [address] [place] [address]

## 2023-04-05 NOTE — H&P
Hospitalist Admission Note    NAME:  Wolf Gambino   :  1960   MRN:  [id number]     Date/Time:  2023 10:32 PM    Patient PCP: Sohan Singh MD  ________________________________________________________________________    Given the patient's current clinical presentation, I have a high level of concern for decompensation if discharged from the emergency department. Complex decision making was performed, which includes reviewing the patient's available past medical records, laboratory results, and x-ray films. My assessment of this patient's clinical condition and my plan of care is as follows. Assessment / Plan:    R breast wound dehiscence: The patient underwent R beast implant removal due to wound dehiscence. VSS  WBC normal, Hg 11.2  BUN 14, Cr 0.84    Admit and cont to monitor  F/U wound cultures  Cont w/ IV Vancomycin, LVQ 500mg daily and Flagyl 500mg q8h    2. HTN:    Cont w/ Norvasc 5mg daily    3. Breast Cancer:    Cont w/ Olaparib 300mg bid    Body mass index is 40.03 kg/m².:  40 or above: Morbid obesity    I have personally reviewed the radiographs, laboratory data in Epic and decisions and statements above are based partially on this personal interpretation. Code Status: Full Code     Prophylaxis:  Lovenox SQ     Subjective:   CHIEF COMPLAINT: R breast implant removal    HISTORY OF PRESENT ILLNESS:       The patient kim  62 y/o AA F w/ PMH breast cancer s/p bilateral mastectomy, XRT and chemotherapy who underwent expanders but this was removed as she needed MRI evaluation. The patient and bilateral implants but over the past few days noted that her right breast began to have wound dehiscence w/ clear and purulent drainage. She reports of a constant sore and achy pain which is exacerbated by any movement. There is no increased erythema or warmth to touch. She underwent R breast implant removal today and is requiring further hospitalization.   She denies any fever, chills or night sweats. She has no chest pain, palpitations, coughing, wheezing or dyspnea. Past Medical History:   Diagnosis Date    Arthritis     b/l knees, hands,shoulders and feet    Chronic pain     headaches and knees    Depression     Fibromyalgia     Headache(784.0)     Narcolepsy     Thromboembolus ([address]     RLE as a child after impact injury      Past Surgical History:   Procedure Laterality Date    ENDOMETRIAL CRYOABLATION      HX  SECTION      HX HEENT  1986    [place] Teeth removal    HX ORTHOPAEDIC Right 2017    Torn meniscus repair    HX ORTHOPAEDIC Right     Bone spur removal & scope of shoulder    [place] UNLISTED PROCEDURE BREAST Bilateral     expanders taken out for MRI, with implants incerted     Social History     Tobacco Use    Smoking status: Never    Smokeless tobacco: Never   Substance Use Topics    Alcohol use: No      Family History   Problem Relation Age of Onset    Diabetes Mother         insulin    Cancer Mother         breast    Thyroid Disease Mother     Heart Disease Mother     Hypertension Father     Cancer Paternal Grandmother     Heart Disease Paternal Grandmother     Kidney Disease Paternal Grandfather         Allergies   Allergen Reactions    Pcn [Penicillins] Anaphylaxis, Shortness of Breath and Swelling     Pt has tolerated ancef in past    Oxycodone Other (comments)     \"Aversion/hyperactivity\"  \"Extreme insomnia\"  \"No therapeutic decrease in pain\"        Prior to Admission medications    Medication Sig Start Date End Date Taking? Authorizing Provider   amLODIPine (NORVASC) 5 mg tablet Take 5 mg by mouth daily. 3/31/23  Yes Provider, Historical   Venlafaxine-ER 24 HR (EFFEXOR-ER) 225 mg tablet Take 1 Tablet by mouth daily. 22  Yes Provider, Historical   rosuvastatin (CRESTOR) 10 mg tablet Take 10 mg by mouth Every morning. 23  Yes Provider, Historical   lidocaine/prilocaine (EMLA EX) by Apply Externally route.  Apply one hour prior to port access   Yes Provider, Historical   loratadine (Claritin) 10 mg tablet Take 10 mg by mouth daily. Indications: prevention of bone pain from cancer treatment   Yes Provider, Historical   Emgality Pen 120 mg/mL injection 120 mg by SubCUTAneous route every twenty-eight (28) days. 1/9/23  Yes Provider, Historical   aluminum-magnesium hydroxide 200-200 mg/5 mL susp 5 mL, diphenhydrAMINE 12.5 mg/5 mL liqd 5 mL, lidocaine 2 % soln 5 mL 15 mL by Swish and Spit route four (4) times daily as needed for Pain. Magic mouth wash   Maalox  Lidocaine 2% viscous   Diphenhydramine oral solution     Pharmacy to mix equal portions of ingredients to a total volume as indicated in the dispense amount. Yes Provider, Historical   prochlorperazine (COMPAZINE) 10 mg tablet Take 10 mg by mouth every six (6) hours as needed. 3/31/23  Yes Provider, Historical   magnesium oxide (MAG-OX) 400 mg tablet Take 400 mg by mouth daily. Yes Provider, Historical   naproxen (NAPROSYN) 500 mg tablet Take 500 mg by mouth two (2) times a day. 3/16/23  Yes Provider, Historical   loperamide (IMODIUM) 2 mg capsule Take 2 mg by mouth as needed for Diarrhea. Indications: diarrhea   Yes Provider, Historical   simethicone (Gas-X) 125 mg capsule Take 250 mg by mouth daily as needed for Flatulence. Yes Provider, Historical   trimethoprim-sulfamethoxazole (BACTRIM DS, SEPTRA DS) 160-800 mg per tablet Take 1 Tablet by mouth two (2) times a day. 4/1/23  Yes Provider, Historical   Lynparza 150 mg tablet Take 300 mg by mouth two (2) times a day. 3/31/23  Yes Provider, Historical   morphine IR (MS IR) 15 mg tablet Take 15 mg by mouth every six (6) hours as needed. 3/16/23  Yes Provider, Historical   HYDROcodone-acetaminophen (Norco)  mg tablet Take 1 Tablet by mouth four (4) times daily as needed for Pain. Yes Provider, Historical   cyclobenzaprine (FLEXERIL) 10 mg tablet Take 1 Tab by mouth two (2) times daily as needed for Muscle Spasm(s).  9/26/17  Yes Precilla Fails, Beto Cannon NP   methylphenidate HCl (RITALIN) 10 mg tablet Take 10 mg by mouth three (3) times daily as needed. Yes Provider, Historical   methylphenidate HCl 40 mg BP50 Take 40 mg by mouth every morning. Yes Provider, Historical   gabapentin (NEURONTIN) 600 mg tablet Take 1 Tab by mouth three (3) times daily. 7/19/17  Yes Yocasta Thomas MD   naratriptan (AMERGE) 2.5 mg tab Take 2.5 mg by mouth once as needed. 2.5 mg at onset of headache, may repeat in 4 hours if needed    Yes Provider, Historical   Minoxidil 5 % foam by Apply Externally route daily. Indications: hair regrowth    Provider, Historical   naloxone (NARCAN) 4 mg/actuation nasal spray 1 [place] by IntraNASal route as needed for Other (Respiratory depression). Give single spray into one nostril. Call 911. Give doses every 2 to 3 minutes, alternating nostrils, until assistance arrives using a new nasal spray with each dose, if patient does not respond or responds and then relapses  Patient not taking: Reported on 4/4/2023 9/26/17   Derrick Cadet NP   ondansetron (ZOFRAN ODT) 8 mg disintegrating tablet Take 8 mg by mouth every eight (8) hours as needed.   Patient not taking: Reported on 4/4/2023    Provider, Historical       REVIEW OF SYSTEMS:  See HPI for details  General: negative for fever, chills, sweats, weakness, weight loss  Eyes: negative for blurred vision, eye pain, loss of vision, diplopia  Ear Nose and Throat: negative for rhinorrhea, pharyngitis, otalgia, tinnitus, speech or swallowing difficulties  Respiratory:  negative for pleuritic pain, cough, sputum production, wheezing, SOB, GARZA  Cardiology:  negative for chest pain, palpitations, orthopnea, PND, edema, syncope   Gastrointestinal: negative for abdominal pain, N/V, dysphagia, change in bowel habits, bleeding  Genitourinary: negative for frequency, urgency, dysuria, hematuria, incontinence  Muskuloskeletal : negative for arthralgia, myalgia  Hematology: negative for easy bruising, bleeding, lymphadenopathy  Dermatological: negative for rash, ulceration, mole change, new lesion  Endocrine: negative for hot flashes or polydipsia  Neurological: negative for headache, dizziness, confusion, focal weakness, paresthesia, memory loss, gait disturbance  Psychological: negative for anxiety, depression, agitation      Objective:   VITALS:    Visit Vitals  /73   Pulse 88   Temp 97.7 °F (36.5 °C)   Resp 15   Ht 5' 6\" (1.676 m)   Wt 112.5 kg (248 lb)   SpO2 95%   BMI 40.03 kg/m²     PHYSICAL EXAM:    Physical Exam:    Gen: Well-developed, well-nourished, in no acute distress  HEENT:  Pink conjunctivae, PERRL, hearing intact to voice, moist mucous membranes  Neck: Supple, without masses, thyroid non-tender  Resp: No accessory muscle use, clear breath sounds without wheezes rales or rhonchi  Card: No murmurs, normal S1, S2 without thrills, bruits or peripheral edema  Abd:  Soft, non-tender, non-distended, normoactive bowel sounds are present, no palpable organomegaly and no detectable hernias  Lymph:  No cervical or inguinal adenopathy  Musc: No cyanosis or clubbing  Skin: No rashes or ulcers, skin turgor is good  Neuro:  Cranial nerves are grossly intact, no focal motor weakness, follows commands appropriately  Psych:  Good insight, oriented to person, place and time, alert  _______________________________________________________________________  Care Plan discussed with:  Pt's condition, Imaging findings, Lab findings, Assessment, and Care Plan discussed with: Patient and Care Manager  _______________________________________________________________________    Probable disposition:  Home  ________________________________________________________________________    Comments   >50% of visit spent in counseling and coordination of care  Chart reviewed  Discussion with patient and/or family and questions answered     ________________________________________________________________________  Signed: Rudi Parham Juan Soliman MD        Procedures: see electronic medical records for all procedures/Xrays and details which were not copied into this note but were reviewed prior to creation of Plan. LAB DATA REVIEWED:    Recent Results (from the past 24 hour(s))   SAMPLES BEING HELD    Collection Time: 04/04/23  1:23 PM   Result Value Ref Range    SAMPLES BEING HELD BL,RED,SST,GR     COMMENT        Add-on orders for these samples will be processed based on acceptable specimen integrity and analyte stability, which may vary by analyte. METABOLIC PANEL, COMPREHENSIVE    Collection Time: 04/04/23  1:23 PM   Result Value Ref Range    Sodium 141 136 - 145 mmol/L    Potassium 3.8 3.5 - 5.1 mmol/L    Chloride 111 (H) 97 - 108 mmol/L    CO2 26 21 - 32 mmol/L    Anion gap 4 (L) 5 - 15 mmol/L    Glucose 119 (H) 65 - 100 mg/dL    BUN 14 6 - 20 MG/DL    Creatinine 0.84 0.55 - 1.02 MG/DL    BUN/Creatinine ratio 17 12 - 20      eGFR >60 >60 ml/min/1.73m2    Calcium 9.1 8.5 - 10.1 MG/DL    Bilirubin, total 0.4 0.2 - 1.0 MG/DL    ALT (SGPT) 24 12 - 78 U/L    AST (SGOT) 18 15 - 37 U/L    Alk. phosphatase 119 (H) 45 - 117 U/L    Protein, total 7.0 6.4 - 8.2 g/dL    Albumin 3.6 3.5 - 5.0 g/dL    Globulin 3.4 2.0 - 4.0 g/dL    A-G Ratio 1.1 1.1 - 2.2     CBC WITH AUTOMATED DIFF    Collection Time: 04/04/23  1:23 PM   Result Value Ref Range    WBC 6.2 3.6 - 11.0 K/uL    RBC 4.10 3.80 - 5.20 M/uL    HGB 11.2 (L) 11.5 - 16.0 g/dL    HCT 35.5 35.0 - 47.0 %    MCV 86.6 80.0 - 99.0 FL    MCH 27.3 26.0 - 34.0 PG    MCHC 31.5 30.0 - 36.5 g/dL    RDW 18.4 (H) 11.5 - 14.5 %    PLATELET 883 007 - 459 K/uL    MPV 10.0 8.9 - 12.9 FL    NRBC 0.3 (H) 0  WBC    ABSOLUTE NRBC 0.02 (H) 0.00 - 0.01 K/uL    NEUTROPHILS 72 32 - 75 %    LYMPHOCYTES 13 12 - 49 %    MONOCYTES 9 5 - 13 %    EOSINOPHILS 5 0 - 7 %    BASOPHILS 1 0 - 1 %    IMMATURE GRANULOCYTES 0 0.0 - 0.5 %    ABS. NEUTROPHILS 4.5 1.8 - 8.0 K/UL    ABS. LYMPHOCYTES 0.8 0.8 - 3.5 K/UL    ABS.  MONOCYTES 0.5 0.0 - 1.0 K/UL    ABS. EOSINOPHILS 0.3 0.0 - 0.4 K/UL    ABS. BASOPHILS 0.0 0.0 - 0.1 K/UL    ABS. IMM.  GRANS. 0.0 0.00 - 0.04 K/UL    DF AUTOMATED     CULTURE, WOUND W GRAM STAIN    Collection Time: 04/04/23  5:14 PM    Specimen: Surgical Specimen    RIGHT BREAST   Result Value Ref Range    Special Requests: NO SPECIAL REQUESTS      GRAM STAIN NO WBC'S SEEN      GRAM STAIN NO ORGANISMS SEEN      Culture result: PENDING

## 2023-04-05 NOTE — PROGRESS NOTES
4/5/2023 9:47 AM   Care Management Assessment      Reason for Admission:  Emergency - Surgery required    Wound dehiscence [T81.30XA]  Procedure(s) (LRB):  RIGHT TISSUE EXPANDER REMOVAL, capsulectomy (Right)  1 Day Post-Op      Assessment:   [x]In person with pt   Charted address and phone numbers confirmed. RUR: 7%  Risk Level: [x]Low []Moderate []High  Value-based purchasing: [] Yes [x] No    Advance Directive: Full Code. [x] No AD on file. [] AD on file. [] Current AD not on file. Copy requested. [] Requests AD, and referral submitted to [facility]. Healthcare Decision Maker:   Primary Decision Maker: Elliott Roberts - Daughter - [phone]        Assessment:    Age: 63 y.o. Sex: [] Male [x]Female     Residency: [x]Private residence []Apartment []Assisted Living []LTC []Other:   Exterior Steps: 4  Interior Steps: 0    Lives With: Alone    Prior functioning:  [x]Independent with ADLs and iADLS []Dependent with ADLs and iADLs []Partial dependence, Specify:     Cognition: [x]Intact []Some spheres some of the time. []Some spheres all of the time. []All spheres all of the time.      Prior transportation method: [x]Self []Spouse []Other family members []Medicaid transport []Other:     Prior DME required:  []None []RW [x]Cane []Crutches []Bedside commode []CPAP []Home O2 (Liter/Provider: ) []Nebulizer   [x]Shower Chair []Wheelchair []Hospital Bed []Eduardo []Stair lift []Rollator []Other:    Rehab history: [x]Home Health (Unable to recall)     Covid vaccination status:   [] Yes, Type:  [] Booster 1 [] Booster 2  [] No  [x] N/A / Not asked    Insurer:   Insurance Information                  [employer]/[place] [employer] [employer] Phone: --    Subscriber: Melissa Garcia Subscriber#: [id number]    Group#: [id number] Precert#: --            PCP: Chong Owens   Address: [address] [place] [address] / [place] [id number]   Phone number: [phone]   Current patient: [x]Yes []No   Approximate date of last visit:    Access to virtual PCP visits: []Yes []No    Pharmacy: [facility] Transport: Pt's friends          Transition of care plan:    [x] Home with family assistance as needed, and outpatient follow-up. CORINE Sheffield    Care Management Interventions  PCP Verified by CM:  Yes  Arun Signup: No  Discharge Durable Medical Equipment: No  Physical Therapy Consult: Yes  Occupational Therapy Consult: Yes  Speech Therapy Consult: No  Support Systems: Spouse/Significant Other  Discharge Location  Patient Expects to be Discharged to[de-identified] Home with family assistance

## 2023-04-05 NOTE — PROGRESS NOTES
Hospitalist Progress Note  Koby Nicole MD  Answering service: [phone] OR [phone] from in house phone        Date of Service:  2023  NAME:  Dean Chavira  :  1960  MRN:  [id number]      Admission Summary/HPI:   The patient kim  62 y/o AA F w/ PMH breast cancer s/p bilateral mastectomy, XRT and chemotherapy who underwent expanders but this was removed as she needed MRI evaluation. The patient and bilateral implants but over the past few days noted that her right breast began to have wound dehiscence w/ clear and purulent drainage. She reports of a constant sore and achy pain which is exacerbated by any movement. There is no increased erythema or warmth to touch. She underwent R breast implant removal today and is requiring further hospitalization. She denies any fever, chills or night sweats. She has no chest pain, palpitations, coughing, wheezing or dyspnea. Interval history / Subjective: On IV antibiotics. SSI insulin started with Glucose >200 overnight. Patient states she is not a diabetic but was started on metformin for an A1C of >7.5 a few months ago, and it is back down to <6.5. A1C is pending. Patient has no other complaints today. Assessment & Plan:     R breast wound dehiscence: The patient underwent R beast implant removal due to wound dehiscence. Bcx obtained  F/U wound cultures - NGTD  Cont w/ IV Vancomycin, LVQ 500mg daily and Flagyl 500mg q8h  Plastic surgery following     HTN:  Cont w/ Norvasc 5mg daily     Breast Cancer:  Cont w/ Olaparib 300mg bid     Diabetes? New Dx? A1C is peding  AM glucose >200  SSI     I have independently reviewed and interpreted patient's lab and other diagnostic data.  External notes were reviewed    Code status[de-identified] Full  Prophylaxis: Heparin, SCD  Care Plan discussed with: Patient, RN, Multidisciplinary Rounds  Anticipated Disposition:      Hospital Problems  Date Reviewed: 7/28/2017            Codes Class Noted POA    Wound dehiscence ICD-10-CM: T81.30XA  ICD-9-CM: 998.30  4/4/2023 Unknown               Review of Systems:   A comprehensive review of systems was negative except for that written in the HPI. Vital Signs:    Last 24hrs VS reviewed since prior progress note.  Most recent are:    Patient Vitals for the past 24 hrs:   Temp Pulse Resp BP SpO2   04/05/23 1132 98.1 °F (36.7 °C) 88 16 111/69 96 %   04/05/23 0730 97.8 °F (36.6 °C) 81 14 110/66 96 %   04/05/23 0400 97.8 °F (36.6 °C) 78 12 117/75 95 %   04/05/23 0059 98.2 °F (36.8 °C) 81 16 131/64 98 %   04/04/23 2200 98 °F (36.7 °C) 90 18 118/76 98 %   04/04/23 2100 97.6 °F (36.4 °C) 92 18 114/78 97 %   04/04/23 2000 97.8 °F (36.6 °C) 90 18 110/76 96 %   04/04/23 1920 -- 88 15 106/73 95 %   04/04/23 1905 -- 88 20 111/64 95 %   04/04/23 1845 -- 99 30 129/67 100 %   04/04/23 1830 -- 96 16 (!) 131/99 99 %   04/04/23 1815 -- 87 12 133/82 99 %   04/04/23 1812 -- 81 12 101/85 100 %   04/04/23 1807 -- 78 16 115/69 100 %   04/04/23 1803 -- 79 15 105/65 100 %   04/04/23 1801 -- 80 15 108/67 100 %   04/04/23 1800 97.7 °F (36.5 °C) 80 11 100/62 100 %   04/04/23 1616 98.5 °F (36.9 °C) 89 19 110/65 98 %          Intake/Output Summary (Last 24 hours) at 4/5/2023 1246  Last data filed at 4/5/2023 0700  Gross per 24 hour   Intake 1000 ml   Output 50 ml   Net 950 ml          Physical Examination:     I had a face to face encounter with this patient and independently examined them on 4/5/2023 as outlined below:          General : alert x 3, awake, no acute distress,   HEENT: PEERL, EOMI, moist mucus membrane, TM clear  Neck: supple, no JVD, no meningeal signs  Chest: Clear to auscultation bilaterally   CVS: S1 S2 heard, Capillary refill less than 2 seconds  Abd: soft/ non tender, non distended, BS physiological,   Ext: no clubbing, no cyanosis, no edema, brisk 2+ DP pulses  Neuro/Psych: pleasant mood and affect, CN 2-12 grossly intact, sensory grossly within normal limit, Strength 5/5 in all extremities, DTR 1+ x 4  Skin: warm            Data Review:    Review and/or order of clinical lab test  Review and/or order of tests in the radiology section of CPT  Review and/or order of tests in the medicine section of CPT  Discussion of test results with performing physician    I have independently reviewed and interpreted patient's lab and all other diagnostic data    Notes reviewed from all clinical/nonclinical/nursing services involved in patient's clinical care. Care coordination discussions were held with appropriate clinical/nonclinical/ nursing providers based on care coordination needs. No results found. Labs:     Recent Labs     04/05/23  0248 04/04/23  1323   WBC 5.1 6.2   HGB 10.9* 11.2*   HCT 34.6* 35.5    202     Recent Labs     04/05/23  0248 04/04/23  1323    141   K 4.7 3.8   * 111*   CO2 24 26   BUN 12 14   CREA 0.80 0.84   * 119*   CA 8.6 9.1     Recent Labs     04/04/23  1323   ALT 24   *   TBILI 0.4   TP 7.0   ALB 3.6   GLOB 3.4     No results for input(s): INR, PTP, APTT, INREXT in the last 72 hours. No results for input(s): FE, TIBC, PSAT, FERR in the last 72 hours. No results found for: FOL, RBCF   No results for input(s): PH, PCO2, PO2 in the last 72 hours. No results for input(s): CPK, CKNDX, TROIQ in the last 72 hours.     No lab exists for component: CPKMB  Lab Results   Component Value Date/Time    Cholesterol, total 177 10/15/2009 04:20 PM    HDL Cholesterol 67 (H) 10/15/2009 04:20 PM    LDL, calculated 101.4 (H) 10/15/2009 04:20 PM    Triglyceride 43 10/15/2009 04:20 PM    CHOL/HDL Ratio 2.6 10/15/2009 04:20 PM     Lab Results   Component Value Date/Time    Glucose (POC) 154 (H) 04/05/2023 11:35 AM     Lab Results   Component Value Date/Time    Color YELLOW/STRAW 09/20/2017 01:38 PM    Appearance CLEAR 09/20/2017 01:38 PM    Specific gravity 1.021 09/20/2017 01:38 PM    pH (UA) 6.0 09/20/2017 01:38 PM    Protein NEGATIVE 09/20/2017 01:38 PM    Glucose NEGATIVE 09/20/2017 01:38 PM    Ketone NEGATIVE 09/20/2017 01:38 PM    Bilirubin NEGATIVE 09/20/2017 01:38 PM    Urobilinogen 0.2 09/20/2017 01:38 PM    Nitrites NEGATIVE 09/20/2017 01:38 PM    Leukocyte Esterase NEGATIVE 09/20/2017 01:38 PM    Epithelial cells FEW 09/20/2017 01:38 PM    Bacteria 1+ (A) 09/20/2017 01:38 PM    WBC 0-4 09/20/2017 01:38 PM    RBC 0-5 09/20/2017 01:38 PM         Medications Reviewed:     Current Facility-Administered Medications   Medication Dose Route Frequency    glucose chewable tablet 16 g  4 Tablet Oral PRN    glucagon (GLUCAGEN) injection 1 mg  1 mg IntraMUSCular PRN    dextrose 10% infusion 0-250 mL  0-250 mL IntraVENous PRN    insulin lispro (HUMALOG) injection   SubCUTAneous TIDAC    amLODIPine (NORVASC) tablet 5 mg  5 mg Oral DAILY    cyclobenzaprine (FLEXERIL) tablet 10 mg  10 mg Oral BID PRN    loperamide (IMODIUM) capsule 2 mg  2 mg Oral DAILY PRN    loratadine (CLARITIN) tablet 10 mg (Patient Supplied)  10 mg Oral DAILY    morphine IR (MS IR) tablet 15 mg  15 mg Oral Q6H PRN    rosuvastatin (CRESTOR) tablet 10 mg  10 mg Oral QAM    sodium chloride (NS) flush 5-40 mL  5-40 mL IntraVENous Q8H    sodium chloride (NS) flush 5-40 mL  5-40 mL IntraVENous PRN    acetaminophen (TYLENOL) tablet 650 mg  650 mg Oral Q6H PRN    Or    acetaminophen (TYLENOL) suppository 650 mg  650 mg Rectal Q6H PRN    polyethylene glycol (MIRALAX) packet 17 g  17 g Oral DAILY PRN    ondansetron (ZOFRAN ODT) tablet 4 mg  4 mg Oral Q8H PRN    Or    ondansetron (ZOFRAN) injection 4 mg  4 mg IntraVENous Q6H PRN    prochlorperazine (COMPAZINE) tablet 10 mg  10 mg Oral Q6H PRN    olaparib (LYNPARZA) tablet 300 mg (Patient Supplied)  300 mg Oral BID    levoFLOXacin (LEVAQUIN) 500 mg in D5W IVPB  500 mg IntraVENous Q24H    metroNIDAZOLE (FLAGYL) IVPB premix 500 mg  500 mg IntraVENous Q12H gabapentin (NEURONTIN) capsule 600 mg  600 mg Oral TID    vancomycin (VANCOCIN) 1,000 mg in 0.9% sodium chloride 250 mL ([id number])  1,000 mg IntraVENous Q12H    venlafaxine-SR (EFFEXOR-XR) capsule 225 mg  225 mg Oral DAILY WITH BREAKFAST    HYDROcodone-acetaminophen (NORCO)  mg tablet 1 Tablet  1 Tablet Oral QID PRN    simethicone (MYLICON) tablet 990 mg  160 mg Oral DAILY PRN     ______________________________________________________________________  EXPECTED LENGTH OF STAY: - - -  ACTUAL LENGTH OF STAY:          1                 Latia Zayas MD

## 2023-04-06 PROCEDURE — 74011250637 HC RX REV CODE- 250/637: Performed by: HOSPITALIST

## 2023-04-06 PROCEDURE — 74011250636 HC RX REV CODE- 250/636: Performed by: INTERNAL MEDICINE

## 2023-04-06 PROCEDURE — 74011250637 HC RX REV CODE- 250/637: Performed by: INTERNAL MEDICINE

## 2023-04-06 PROCEDURE — 65270000029 HC RM PRIVATE

## 2023-04-06 PROCEDURE — 74011000250 HC RX REV CODE- 250: Performed by: HOSPITALIST

## 2023-04-06 PROCEDURE — 74011250636 HC RX REV CODE- 250/636: Performed by: HOSPITALIST

## 2023-04-06 RX ADMIN — SODIUM CHLORIDE, PRESERVATIVE FREE 10 ML: 5 INJECTION INTRAVENOUS at 13:25

## 2023-04-06 RX ADMIN — LEVOFLOXACIN 750 MG: 5 INJECTION, SOLUTION INTRAVENOUS at 21:22

## 2023-04-06 RX ADMIN — GABAPENTIN 600 MG: 300 CAPSULE ORAL at 21:12

## 2023-04-06 RX ADMIN — ACETAMINOPHEN 650 MG: 325 TABLET ORAL at 21:23

## 2023-04-06 RX ADMIN — ROSUVASTATIN CALCIUM 10 MG: 10 TABLET, FILM COATED ORAL at 08:49

## 2023-04-06 RX ADMIN — HYDROCODONE BITARTRATE AND ACETAMINOPHEN 1 TABLET: 10; 325 TABLET ORAL at 17:41

## 2023-04-06 RX ADMIN — SODIUM CHLORIDE, PRESERVATIVE FREE 10 ML: 5 INJECTION INTRAVENOUS at 06:43

## 2023-04-06 RX ADMIN — AMLODIPINE BESYLATE 5 MG: 5 TABLET ORAL at 08:49

## 2023-04-06 RX ADMIN — MORPHINE SULFATE 15 MG: 15 TABLET ORAL at 12:08

## 2023-04-06 RX ADMIN — METRONIDAZOLE 500 MG: 500 INJECTION, SOLUTION INTRAVENOUS at 21:14

## 2023-04-06 RX ADMIN — VENLAFAXINE HYDROCHLORIDE 225 MG: 150 CAPSULE, EXTENDED RELEASE ORAL at 08:49

## 2023-04-06 RX ADMIN — CYCLOBENZAPRINE 10 MG: 10 TABLET, FILM COATED ORAL at 12:08

## 2023-04-06 RX ADMIN — VANCOMYCIN HYDROCHLORIDE 1000 MG: 1 INJECTION, POWDER, LYOPHILIZED, FOR SOLUTION INTRAVENOUS at 08:55

## 2023-04-06 RX ADMIN — BENZOCAINE AND MENTHOL 1 LOZENGE: 15; 3.6 LOZENGE ORAL at 09:00

## 2023-04-06 RX ADMIN — GABAPENTIN 600 MG: 300 CAPSULE ORAL at 17:37

## 2023-04-06 RX ADMIN — MORPHINE SULFATE 15 MG: 15 TABLET ORAL at 21:19

## 2023-04-06 RX ADMIN — VANCOMYCIN HYDROCHLORIDE 1500 MG: 10 INJECTION, POWDER, LYOPHILIZED, FOR SOLUTION INTRAVENOUS at 17:38

## 2023-04-06 RX ADMIN — SODIUM CHLORIDE, PRESERVATIVE FREE 10 ML: 5 INJECTION INTRAVENOUS at 21:22

## 2023-04-06 RX ADMIN — GABAPENTIN 600 MG: 300 CAPSULE ORAL at 08:49

## 2023-04-06 RX ADMIN — METRONIDAZOLE 500 MG: 500 INJECTION, SOLUTION INTRAVENOUS at 08:48

## 2023-04-06 NOTE — PROGRESS NOTES
Problem: Falls - Risk of  Goal: *Absence of Falls  Description: Document Yo Archer Fall Risk and appropriate interventions in the flowsheet.   Outcome: Progressing Towards Goal  Note: Fall Risk Interventions:                                Problem: Patient Education: Go to Patient Education Activity  Goal: Patient/Family Education  Outcome: Progressing Towards Goal     Problem: Pain  Goal: *Control of Pain  Outcome: Progressing Towards Goal  Goal: *PALLIATIVE CARE:  Alleviation of Pain  Outcome: Progressing Towards Goal     Problem: Patient Education: Go to Patient Education Activity  Goal: Patient/Family Education  Outcome: Progressing Towards Goal     Problem: Patient Education: Go to Patient Education Activity  Goal: Patient/Family Education  Outcome: Progressing Towards Goal     Problem: Pain  Goal: *Control of Pain  Outcome: Progressing Towards Goal

## 2023-04-06 NOTE — PROGRESS NOTES
Problem: Falls - Risk of  Goal: *Absence of Falls  Description: Document Jay Rodriguez Fall Risk and appropriate interventions in the flowsheet.   Outcome: Progressing Towards Goal  Note: Fall Risk Interventions:                                Problem: Patient Education: Go to Patient Education Activity  Goal: Patient/Family Education  Outcome: Progressing Towards Goal     Problem: Pain  Goal: *Control of Pain  Outcome: Progressing Towards Goal  Goal: *PALLIATIVE CARE:  Alleviation of Pain  Outcome: Progressing Towards Goal     Problem: Patient Education: Go to Patient Education Activity  Goal: Patient/Family Education  Outcome: Progressing Towards Goal     Problem: Patient Education: Go to Patient Education Activity  Goal: Patient/Family Education  Outcome: Progressing Towards Goal     Problem: Pain  Goal: *Control of Pain  Outcome: Progressing Towards Goal

## 2023-04-06 NOTE — PROGRESS NOTES
Bedside shift change report given to OCTAVIO Yeager(oncoming nurse) by Philomena Ortez RN (offgoing nurse). Report included the following information SBAR, Kardex, Intake/Output, MAR, and Recent Results.

## 2023-04-06 NOTE — OP NOTES
Operative Note    Patient: Malissa Regan  YOB: 1960  MRN: [id number]    Date of Procedure: 4/4/2023     Pre-Op Diagnosis: RIGHT BREAST INFECTION    Post-Op Diagnosis: Same as preoperative diagnosis. Procedure(s):  Right breast tissue expander removal  Capsulectomy right breast    Surgeon(s):  Norma Hardy MD    Surgical Assistant: Surg Asst-1: Niko Reagan    Anesthesia: General     Estimated Blood Loss (mL):  less than 50     Complications: None    Specimens:   ID Type Source Tests Collected by Time Destination   1 : right mastectomy scar Preservative Breast  Norma Hardy MD 4/4/2023 1710 Pathology   2 : right breast capsule Preservative Breast  Norma Hardy MD 4/4/2023 1718 Pathology   1 : right breast culture Wound Breast CULTURE, ANAEROBIC, CULTURE, WOUND W Natan Riley MD 4/4/2023 1714 Microbiology        Implants: * No implants in log *    Drains:   Camilo-Cotto Drain 04/04/23 Right Breast (Active)   Site Assessment Clean, dry, & intact 04/06/23 0600   Dressing Status Clean, dry, & intact 04/06/23 0600   Status Patent;Draining 04/06/23 0600   Drainage Color Sanguinous 04/06/23 0600   Output (ml) 10 ml 04/06/23 0600       Findings: open wound at center of vertical incision. Some thickened capsule of anterior breast    Indications: This is a 63year-old female with history of right breast cancer s/p wise pattern mastectomy and tissue expander reconstruction who ultimately wanted autologous tissue reconstruction. She completed her adjuvant chemotherapy and adjuvant radiation therapy, the radiation therapy being completed in mid February. She developed changes in vision and was recommended to undergo an Mri of her brain to rule out metastatic disease; however, this could not be done with her tissue expanders in place.  Due to this, we elected to perform tissue expander to implant exchange in a recently radiated breast, which was performed one month ago. She developed drainage from the right breast this past weekend with no other symptoms of illness. She presents today for removal of the implant. Detailed Description of Procedure:   Patient was greeted in the preoperative holding area. Informed consent was reviewed and all questions were answered. She was given preoperative maintenance antibiotics and brought back to the operating room. She was laid supine on the operating table and intubated with an endotracheal tube. Her extremities were padded and secured appropriately. Her surgical sites were prepped and draped in the usual sterile fashion. A formal timeout was performed confirming the patient, operative site, operation to be performed, allergies, antibiotic status, medications on the field and all members of the team.     The right breast mastectomy scar was excised and sent for permanent specimen. The dissection was carried down to the capsule and the pocket was entered. Serous fluid was identified and cultures were sent for aerobes NS anaerobes. The capsule was excised with a combination of bovie and scissors and sent for specimen. I then scrubbed the pocket with a betadine brush. The pocket was irrigated and hemostasis achieved. A #15 LAMONTE drain was placed in the pocket and secured with a 3-0 nylon suture. Exparel was infiltrated into the pectoralis muscle. The skin was then closed with 3-0 deep dermal monocryl suture followed by a running subcuticular 3-0/4-0 monocryl stratafix suture. All sharp, sponge and instrument counts were correct prior to the closure of the incisions. Patient was extubated without event and transferred to the PACU in stable condition.     Electronically Signed by Hedy Hairston MD on 4/6/2023 at 11:43 AM

## 2023-04-06 NOTE — PROGRESS NOTES
4/6/2023 2:43 PM   Care Management Progress Note      ICD-10-CM ICD-9-CM    1. Breast pain, right  N64.4 611.71       2. Wound dehiscence [T81.30XA (ICD-10-CM)]  T81.30XA 998.30       3. High blood sugar [R73.9 (ICD-10-CM)]  R73.9 790.29           RUR:  8%  Risk Level: [x]Low []Moderate []High  Value-based purchasing: [] Yes [x] No  Bundle patient: [] Yes [x] No   Specify:     Transition of care plan:  Ongoing medical management  ID consult pending, following for needs  Home with assistance from friends and family  Outpatient follow-up.   Pt's friend to transport

## 2023-04-06 NOTE — CONSULTS
Infectious Disease Consult    Impression/Plan   Right breast implant exposure due to wound dehiscence. Status post tissue expander removal and capsulectomy 4/23. Preliminary cultures growing GPC in clusters from thio broth. Is not clear if this represents contaminated sample or true infection. Nevertheless we will plan discharge on 7 to 10-day course of p.o. antibiotics. We will make further recommendations once organism has been identified and sensitivities are available. Anticipate discharge on either cephalexin or TMP-SMX based on sensitivities. Narrow antibiotics to vancomycin. Breast cancer. On olaparib  Penicillin allergy. Patient is able to tolerate cephalosporin antibiotics. She is unable to tolerate doxycycline due to GI upset  Hyperglycemia. Hemoglobin A1c 6.3     Anti-infectives:   Vancomycin  Levofloxacin  Metronidazole    Subjective:   Date of Consultation:  April 6, 2023  Date of Admission: 4/4/2023   Referring Physician:     Patient is a 63 y.o. female with a past medical history significant for depression, fibromyalgia, and penicillin allergy who is being seen for right breast infection. Per plastic surgery operative note: \" This is a 63year-old female with history of right breast cancer s/p wise pattern mastectomy and tissue expander reconstruction who ultimately wanted autologous tissue reconstruction. She completed her adjuvant chemotherapy and adjuvant radiation therapy, the radiation therapy being completed in mid February. She developed changes in vision and was recommended to undergo an Mri of her brain to rule out metastatic disease; however, this could not be done with her tissue expanders in place. Due to this, we elected to perform tissue expander to implant exchange in a recently radiated breast, which was performed one month ago. She developed drainage from the right breast this past weekend with no other symptoms of illness.  She presents today for removal of the implant. \"    Patient was taken to the OR  where she underwent right breast tissue expander removal and capsulectomy of the right breast.  Intraoperatively serous fluid was identified which was sent for culture. Preliminary cultures revealed gram-positive cocci in clusters in thio broth only. She is currently on vancomycin, levofloxacin, and metronidazole.   The infectious diseases service has been asked to assist with antibiotic management    Patient Active Problem List   Diagnosis Code    Advice or immunization for travel [id number]    Lumbar spinal stenosis M48.061    Lumbar neuritis M54.16    Other intervertebral disc degeneration, lumbar region M51.36    Lumbar herniated disc M51.26    Radiculopathy, lumbar region M54.16    Spinal stenosis of lumbar region with neurogenic claudication M48.062    Wound dehiscence T81.30XA     Past Medical History:   Diagnosis Date    Arthritis     b/l knees, hands,shoulders and feet    Chronic pain     headaches and knees    Depression     Fibromyalgia     Headache(784.0)     Narcolepsy     Thromboembolus ([facility] [address]     RLE as a child after impact injury      Family History   Problem Relation Age of Onset    Diabetes Mother         insulin    Cancer Mother         breast    Thyroid Disease Mother     Heart Disease Mother     Hypertension Father     Cancer Paternal Grandmother     Heart Disease Paternal Grandmother     Kidney Disease Paternal Grandfather       Social History     Tobacco Use    Smoking status: Never    Smokeless tobacco: Never   Substance Use Topics    Alcohol use: No     Past Surgical History:   Procedure Laterality Date    ENDOMETRIAL CRYOABLATION      HX  SECTION      HX CYST INCISION AND DRAINAGE Right 2023    RIGHT TISSUE EXPANDER REMOVAL, capsulectomy performed by Esmer Santiago MD at [facility] OR    HX [place] Teeth removal    HX ORTHOPAEDIC Right 2017    Torn meniscus repair    HX ORTHOPAEDIC Right     Bone spur removal & scope of shoulder    CO UNLISTED PROCEDURE BREAST Bilateral     expanders taken out for MRI, with implants incerted      Prior to Admission medications    Medication Sig Start Date End Date Taking? Authorizing Provider   amLODIPine (NORVASC) 5 mg tablet Take 5 mg by mouth daily. 3/31/23  Yes Provider, Historical   Venlafaxine-ER 24 HR (EFFEXOR-ER) 225 mg tablet Take 1 Tablet by mouth daily. 11/29/22  Yes Provider, Historical   rosuvastatin (CRESTOR) 10 mg tablet Take 10 mg by mouth Every morning. 2/2/23  Yes Provider, Historical   lidocaine/prilocaine (EMLA EX) by Apply Externally route. Apply one hour prior to port access   Yes Provider, Historical   loratadine (Claritin) 10 mg tablet Take 10 mg by mouth daily. Indications: prevention of bone pain from cancer treatment   Yes Provider, Historical   Emgality Pen 120 mg/mL injection 120 mg by SubCUTAneous route every twenty-eight (28) days. 1/9/23  Yes Provider, Historical   aluminum-magnesium hydroxide 200-200 mg/5 mL susp 5 mL, diphenhydrAMINE 12.5 mg/5 mL liqd 5 mL, lidocaine 2 % soln 5 mL 15 mL by Swish and Spit route four (4) times daily as needed for Pain. Magic mouth wash   Maalox  Lidocaine 2% viscous   Diphenhydramine oral solution     Pharmacy to mix equal portions of ingredients to a total volume as indicated in the dispense amount. Yes Provider, Historical   prochlorperazine (COMPAZINE) 10 mg tablet Take 10 mg by mouth every six (6) hours as needed. 3/31/23  Yes Provider, Historical   magnesium oxide (MAG-OX) 400 mg tablet Take 400 mg by mouth daily. Yes Provider, Historical   naproxen (NAPROSYN) 500 mg tablet Take 500 mg by mouth two (2) times a day. 3/16/23  Yes Provider, Historical   loperamide (IMODIUM) 2 mg capsule Take 2 mg by mouth as needed for Diarrhea. Indications: diarrhea   Yes Provider, Historical   simethicone (Gas-X) 125 mg capsule Take 250 mg by mouth daily as needed for Flatulence.    Yes Provider, Historical trimethoprim-sulfamethoxazole (BACTRIM DS, SEPTRA DS) 160-800 mg per tablet Take 1 Tablet by mouth two (2) times a day. 4/1/23  Yes Provider, Historical   Lynparza 150 mg tablet Take 300 mg by mouth two (2) times a day. 3/31/23  Yes Provider, Historical   morphine IR (MS IR) 15 mg tablet Take 15 mg by mouth every six (6) hours as needed. 3/16/23  Yes Provider, Historical   HYDROcodone-acetaminophen (Norco)  mg tablet Take 1 Tablet by mouth four (4) times daily as needed for Pain. Yes Provider, Historical   cyclobenzaprine (FLEXERIL) 10 mg tablet Take 1 Tab by mouth two (2) times daily as needed for Muscle Spasm(s). 9/26/17  Yes Ewa Chamorro NP   methylphenidate HCl (RITALIN) 10 mg tablet Take 10 mg by mouth three (3) times daily as needed. Yes Provider, Historical   methylphenidate HCl 40 mg BP50 Take 40 mg by mouth every morning. Yes Provider, Historical   gabapentin (NEURONTIN) 600 mg tablet Take 1 Tab by mouth three (3) times daily. 7/19/17  Yes Belle Boles MD   naratriptan (AMERGE) 2.5 mg tab Take 2.5 mg by mouth once as needed. 2.5 mg at onset of headache, may repeat in 4 hours if needed    Yes Provider, Historical   Minoxidil 5 % foam by Apply Externally route daily. Indications: hair regrowth    Provider, Historical   naloxone (NARCAN) 4 mg/actuation nasal spray 1 [place] by IntraNASal route as needed for Other (Respiratory depression). Give single spray into one nostril. Call 911. Give doses every 2 to 3 minutes, alternating nostrils, until assistance arrives using a new nasal spray with each dose, if patient does not respond or responds and then relapses  Patient not taking: Reported on 4/4/2023 9/26/17   Ewa Chamorro NP   ondansetron (ZOFRAN ODT) 8 mg disintegrating tablet Take 8 mg by mouth every eight (8) hours as needed.   Patient not taking: Reported on 4/4/2023    Provider, Historical     Allergies   Allergen Reactions    Pcn [Penicillins] Anaphylaxis, Shortness of Breath and Swelling     Pt has tolerated ancef in past    Oxycodone Other (comments)     \"Aversion/hyperactivity\"  \"Extreme insomnia\"  \"No therapeutic decrease in pain\"        Review of Systems:  A comprehensive review of systems was negative except for that written in the History of Present Illness. Objective:   Blood pressure 127/80, pulse 85, temperature 98.1 °F (36.7 °C), resp. rate 16, height 5' 6\" (1.676 m), weight 248 lb (112.5 kg), last menstrual period 09/10/2017, SpO2 98 %. Temp (24hrs), Av °F (36.7 °C), Min:97.4 °F (36.3 °C), Max:98.2 °F (36.8 °C)       Exam:    General:  Alert, cooperative, well noursished, well developed, appears stated age   Eyes:  Sclera anicteric. Mouth/Throat: Mucous membranes normal   Neck: Supple   Lungs:   No distress   CV:     Abdomen:   non-distended   Extremities: Moves all   Skin: Right breast dressed.   LAMONTE with serosanguineous drainage   Lymph nodes:    Musculoskeletal: Moves all   Lines/Devices:  Peripheral   Psych: Alert and oriented, normal mood affect given the setting       Data Review:   Recent Results (from the past 24 hour(s))   GLUCOSE, POC    Collection Time: 23  3:49 PM   Result Value Ref Range    Glucose (POC) 178 (H) 65 - 117 mg/dL    Performed by Sameer Beyer, POC    Collection Time: 23  8:59 PM   Result Value Ref Range    Glucose (POC) 108 65 - 117 mg/dL    Performed by Jeffrey CHAVES    CBC WITH AUTOMATED DIFF    Collection Time: 23  5:04 AM   Result Value Ref Range    WBC 9.3 3.6 - 11.0 K/uL    RBC 3.79 (L) 3.80 - 5.20 M/uL    HGB 10.3 (L) 11.5 - 16.0 g/dL    HCT 33.3 (L) 35.0 - 47.0 %    MCV 87.9 80.0 - 99.0 FL    MCH 27.2 26.0 - 34.0 PG    MCHC 30.9 30.0 - 36.5 g/dL    RDW 18.6 (H) 11.5 - 14.5 %    PLATELET 518 569 - 939 K/uL    MPV 10.2 8.9 - 12.9 FL    NRBC 0.3 (H) 0  WBC    ABSOLUTE NRBC 0.03 (H) 0.00 - 0.01 K/uL    NEUTROPHILS 80 (H) 32 - 75 %    LYMPHOCYTES 13 12 - 49 %    MONOCYTES 7 5 - 13 %    EOSINOPHILS 0 0 - 7 % BASOPHILS 0 0 - 1 %    IMMATURE GRANULOCYTES 0 0.0 - 0.5 %    ABS. NEUTROPHILS 7.4 1.8 - 8.0 K/UL    ABS. LYMPHOCYTES 1.2 0.8 - 3.5 K/UL    ABS. MONOCYTES 0.6 0.0 - 1.0 K/UL    ABS. EOSINOPHILS 0.0 0.0 - 0.4 K/UL    ABS. BASOPHILS 0.0 0.0 - 0.1 K/UL    ABS. IMM. GRANS. 0.0 0.00 - 0.04 K/UL    DF AUTOMATED     METABOLIC PANEL, COMPREHENSIVE    Collection Time: 04/06/23  5:07 AM   Result Value Ref Range    Sodium 140 136 - 145 mmol/L    Potassium 3.7 3.5 - 5.1 mmol/L    Chloride 109 (H) 97 - 108 mmol/L    CO2 28 21 - 32 mmol/L    Anion gap 3 (L) 5 - 15 mmol/L    Glucose 141 (H) 65 - 100 mg/dL    BUN 12 6 - 20 MG/DL    Creatinine 0.75 0.55 - 1.02 MG/DL    BUN/Creatinine ratio 16 12 - 20      eGFR >60 >60 ml/min/1.73m2    Calcium 9.0 8.5 - 10.1 MG/DL    Bilirubin, total 0.4 0.2 - 1.0 MG/DL    ALT (SGPT) 18 12 - 78 U/L    AST (SGOT) 14 (L) 15 - 37 U/L    Alk.  phosphatase 97 45 - 117 U/L    Protein, total 6.2 (L) 6.4 - 8.2 g/dL    Albumin 3.0 (L) 3.5 - 5.0 g/dL    Globulin 3.2 2.0 - 4.0 g/dL    A-G Ratio 0.9 (L) 1.1 - 2.2     VANCOMYCIN, RANDOM    Collection Time: 04/06/23  6:20 AM   Result Value Ref Range    Vancomycin, random 11.4 UG/ML   GLUCOSE, POC    Collection Time: 04/06/23  8:39 AM   Result Value Ref Range    Glucose (POC) 125 (H) 65 - 117 mg/dL    Performed by Joanna Martinez    GLUCOSE, POC    Collection Time: 04/06/23 11:54 AM   Result Value Ref Range    Glucose (POC) 88 65 - 117 mg/dL    Performed by Joanna Martinez         Microbiology:      Studies:      Signed By: Surinder Shin DO     April 6, 2023

## 2023-04-06 NOTE — PROGRESS NOTES
Plastic Surgery Progress Note     POD 2 s/p R implant removal     No issues overnight. Reports pain is controlled on oral medications. Cx growing out NIELS [facility] F[facility]    Patient Vitals for the past 24 hrs:   Temp Pulse Resp BP SpO2   04/06/23 1148 98.1 °F (36.7 °C) 85 16 127/80 98 %   04/06/23 0816 98 °F (36.7 °C) 87 16 120/64 99 %   04/06/23 0338 97.4 °F (36.3 °C) 81 17 104/65 98 %   04/06/23 0015 98 °F (36.7 °C) 90 18 107/69 97 %   04/05/23 2011 98.2 °F (36.8 °C) 98 18 119/70 98 %   04/05/23 1549 98 °F (36.7 °C) 88 18 110/62 96 %      Date 04/05/23 0700 - 04/06/23 0659 04/06/23 0700 - 04/07/23 0659   Shift 3311-7059 5638-5340 24 Hour Total 0713-6006 1292-4620 24 Hour Total   INTAKE   P.O. 1090  1090        P.O. 1090  1090      I. V.(mL/kg/hr)  550(0.4) 550(0.2)        Volume (levoFLOXacin (LEVAQUIN) 500 mg in D5W IVPB)  100 100        Volume (metroNIDAZOLE (FLAGYL) IVPB premix 500 mg)  200 200        Volume (vancomycin (VANCOCIN) 1,000 mg in 0.9% sodium chloride 250 mL ([id number]))  250 250        Volume (levoFLOXacin (LEVAQUIN) 750 mg in D5W IVPB)  0 0      Shift Total(mL/kg) 1090(9.7) 550(4.9) 1640(14.6)      OUTPUT   Urine(mL/kg/hr)  1(0) 1(0)        Urine Voided  1 1        Urine Occurrence(s) 1 x 2 x 3 x      Emesis/NG output  0 0        Emesis  0 0      Drains 30 30 60        Output (ml) (Camilo-Cotto Drain 04/04/23 Right Breast) 30 30 60      Other  0 0        Other Output  0 0      Stool  0 0        Stool  0 0      Blood  0 0        Quantitative Blood Loss  0 0        Blood  0 0      Shift Total(mL/kg) 30(0.3) 31(0.3) 61(0.5)      NET [phone]      Weight (kg) 112.5 112.5 112.5 112.5 112.5 112.5      Gen: NAD, comfortable  HEENT: NCAT  CV: reg rate and rhythm  Resp: normal resp effort RA  Breast: R breast incision c/d/I, no erythema.  Drain with s/s drainage    Results       Procedure Component Value Units Date/Time    CULTURE, BLOOD [[id number]] Collected: 04/05/23 0958    Order Status: Completed Specimen: Blood Updated: 04/06/23 0703     Special Requests: NO SPECIAL REQUESTS        Culture result: NO GROWTH AFTER 20 HOURS       CULTURE, ANAEROBIC [[id number]] Collected: 04/04/23 1714    Order Status: No result Updated: 04/04/23 2018    CULTURE, WOUND Dorena Pleas STAIN [[id number]]  (Abnormal) Collected: 04/04/23 1714    Order Status: Completed Specimen: Surgical Specimen Updated: 04/06/23 1110     Special Requests: NO SPECIAL REQUESTS        GRAM STAIN NO WBC'S SEEN         NO ORGANISMS SEEN        Culture result:       GRAM POS COCCI IN CLUSTERS SEEN ON GRAM STAIN OF THE THIO BROTH                 65 yo F POD 2 s/p R implant removal d/t exposure     - fu cultures (gram positive cocci)  - abx per ID  - CT angio abd/pelvis for free flap planning performed  - continue LAMONTE drain, likely will d/c prior to discharge  - dispo pending cultures, abx plan.  Ultimately will need autologous reconstruction     Please do not hesitate to call with questions  [phone]

## 2023-04-06 NOTE — PROGRESS NOTES
[facility] Dosing Services: Antimicrobial Stewardship Daily Doc    Consult for antibiotic dosing of vancomycin by Dr. Torrey Grey  Indication: cellulitis  Day of Therapy: 3    Vancomycin therapy:  Current maintenance dose: vancomycin 1,000 mg IV every 12 hours   Dose calculated to approximate a target AUC/KEON of 400-600  Assessment/Plan: Afebrile; WBCs WNL; SCr stable. Level 11.4 mcg/mL at 06:20 today extrapolates to subtherapeutic AUC/KEON of 369. Will change regimen to vancomycin 1,500 mg IV every 12 hours - predicts therapeutic AUC/KEON of 553. Other Antimicrobial   (not dosed by pharmacist) Levofloxacin 750 mg IV every 24 hours  Metronidazole 500 mg IV every 12 hours   Cultures 04/05/2023 Blood: no growth x 20 hours (pending)  04/04/2023 Surgical specimen: no growth thus far (pending)  04/04/2023 Surgical specimen (for anaerobes): in process   Serum Creatinine Lab Results   Component Value Date/Time    Creatinine 0.75 04/06/2023 05:07 AM       Creatinine Clearance Estimated Creatinine Clearance: 101.5 mL/min (based on SCr of 0.75 mg/dL). Temp Temp: 98 °F (36.7 °C)       WBC Lab Results   Component Value Date/Time    WBC 9.3 04/06/2023 05:04 AM      Procalcitonin No results found for: PCT     For Antifungals, Metronidazole and Nafcillin: Lab Results   Component Value Date/Time    ALT (SGPT) 18 04/06/2023 05:07 AM    AST (SGOT) 14 (L) 04/06/2023 05:07 AM    Alk.  phosphatase 97 04/06/2023 05:07 AM    Bilirubin, total 0.4 04/06/2023 05:07 AM        Pharmacist Alba Rangel

## 2023-04-06 NOTE — DIABETES MGMT
[address] [facility]  DIABETES MANAGEMENT CONSULT     Evaluation and Action Plan   This 63year old AA female was admitted 4/4/23 with wound dehiscence. Receiving wound care & antibiotics. As for BG management, patient reported a pre-diabetes diagnosis pre-COVID pandemic & short-term treatment with metformin. Metformin subsequently stopped. No further diabetes-related intervention as an OP. Has received steroids in combination with OP chemo; chemo completed last year. A1c is 6.3% so remains pre-diabetic. Blood glucose pattern:      Recommend continuing HIGH sensitivity corrective approach. No billing charge.     Dr. Michelle Rod DNP, APRN-CNS, ACNS-BC, BC-ADM, CDCES, FCNS  Clinical Nurse Specialist-Diabetes & Endocrine disorders    Program for Diabetes Health (In-patient CNS consult service)  [phone]  ([facility]) [phone]

## 2023-04-07 RX ADMIN — HYDROCODONE BITARTRATE AND ACETAMINOPHEN 1 TABLET: 10; 325 TABLET ORAL at 15:48

## 2023-04-07 RX ADMIN — ROSUVASTATIN CALCIUM 10 MG: 10 TABLET, FILM COATED ORAL at 08:38

## 2023-04-07 RX ADMIN — GABAPENTIN 600 MG: 300 CAPSULE ORAL at 21:12

## 2023-04-07 RX ADMIN — VANCOMYCIN HYDROCHLORIDE 1500 MG: 10 INJECTION, POWDER, LYOPHILIZED, FOR SOLUTION INTRAVENOUS at 17:22

## 2023-04-07 RX ADMIN — HYDROCODONE BITARTRATE AND ACETAMINOPHEN 1 TABLET: 10; 325 TABLET ORAL at 21:12

## 2023-04-07 RX ADMIN — GABAPENTIN 600 MG: 300 CAPSULE ORAL at 15:48

## 2023-04-07 RX ADMIN — METRONIDAZOLE 500 MG: 500 INJECTION, SOLUTION INTRAVENOUS at 08:39

## 2023-04-07 RX ADMIN — AMLODIPINE BESYLATE 5 MG: 5 TABLET ORAL at 08:38

## 2023-04-07 RX ADMIN — GABAPENTIN 600 MG: 300 CAPSULE ORAL at 08:38

## 2023-04-07 RX ADMIN — SODIUM CHLORIDE, PRESERVATIVE FREE 10 ML: 5 INJECTION INTRAVENOUS at 05:44

## 2023-04-07 RX ADMIN — SODIUM CHLORIDE, PRESERVATIVE FREE 10 ML: 5 INJECTION INTRAVENOUS at 17:22

## 2023-04-07 RX ADMIN — VENLAFAXINE HYDROCHLORIDE 225 MG: 150 CAPSULE, EXTENDED RELEASE ORAL at 08:38

## 2023-04-07 RX ADMIN — VANCOMYCIN HYDROCHLORIDE 1500 MG: 10 INJECTION, POWDER, LYOPHILIZED, FOR SOLUTION INTRAVENOUS at 05:44

## 2023-04-08 RX ADMIN — SODIUM CHLORIDE, PRESERVATIVE FREE 10 ML: 5 INJECTION INTRAVENOUS at 05:56

## 2023-04-08 RX ADMIN — VANCOMYCIN HYDROCHLORIDE 1500 MG: 10 INJECTION, POWDER, LYOPHILIZED, FOR SOLUTION INTRAVENOUS at 05:57

## 2023-04-08 RX ADMIN — VENLAFAXINE HYDROCHLORIDE 225 MG: 150 CAPSULE, EXTENDED RELEASE ORAL at 08:57

## 2023-04-08 RX ADMIN — HYDROCODONE BITARTRATE AND ACETAMINOPHEN 1 TABLET: 10; 325 TABLET ORAL at 01:13

## 2023-04-08 RX ADMIN — GABAPENTIN 600 MG: 300 CAPSULE ORAL at 08:57

## 2023-04-08 RX ADMIN — AMLODIPINE BESYLATE 5 MG: 5 TABLET ORAL at 08:57

## 2023-04-08 RX ADMIN — ROSUVASTATIN CALCIUM 10 MG: 10 TABLET, FILM COATED ORAL at 08:57

## 2023-04-08 RX ADMIN — SODIUM CHLORIDE, PRESERVATIVE FREE 10 ML: 5 INJECTION INTRAVENOUS at 01:13

## 2023-04-08 RX ADMIN — MORPHINE SULFATE 15 MG: 15 TABLET ORAL at 05:57

## 2023-04-08 RX ADMIN — HYDROCODONE BITARTRATE AND ACETAMINOPHEN 1 TABLET: 10; 325 TABLET ORAL at 13:09

## 2023-04-08 RX ADMIN — SODIUM CHLORIDE, PRESERVATIVE FREE 10 ML: 5 INJECTION INTRAVENOUS at 16:00

## 2023-12-11 ENCOUNTER — OFFICE VISIT (OUTPATIENT)
Age: 63
End: 2023-12-11
Payer: COMMERCIAL

## 2023-12-11 VITALS
TEMPERATURE: 98.2 F | OXYGEN SATURATION: 96 % | SYSTOLIC BLOOD PRESSURE: 120 MMHG | DIASTOLIC BLOOD PRESSURE: 81 MMHG | HEART RATE: 99 BPM | WEIGHT: 249.8 LBS | BODY MASS INDEX: 40.32 KG/M2

## 2023-12-11 DIAGNOSIS — I10 ESSENTIAL HYPERTENSION: ICD-10-CM

## 2023-12-11 DIAGNOSIS — E66.01 MORBID OBESITY WITH BMI OF 40.0-44.9, ADULT (HCC): ICD-10-CM

## 2023-12-11 DIAGNOSIS — C50.919 MALIGNANT NEOPLASM OF BREAST IN FEMALE, ESTROGEN RECEPTOR POSITIVE, UNSPECIFIED LATERALITY, UNSPECIFIED SITE OF BREAST (HCC): ICD-10-CM

## 2023-12-11 DIAGNOSIS — Z11.59 ENCOUNTER FOR HEPATITIS C SCREENING TEST FOR LOW RISK PATIENT: ICD-10-CM

## 2023-12-11 DIAGNOSIS — Z17.0 MALIGNANT NEOPLASM OF BREAST IN FEMALE, ESTROGEN RECEPTOR POSITIVE, UNSPECIFIED LATERALITY, UNSPECIFIED SITE OF BREAST (HCC): ICD-10-CM

## 2023-12-11 DIAGNOSIS — Z11.4 SCREENING FOR HIV (HUMAN IMMUNODEFICIENCY VIRUS): ICD-10-CM

## 2023-12-11 DIAGNOSIS — E78.00 ELEVATED CHOLESTEROL: ICD-10-CM

## 2023-12-11 DIAGNOSIS — Z76.89 ESTABLISHING CARE WITH NEW DOCTOR, ENCOUNTER FOR: Primary | ICD-10-CM

## 2023-12-11 DIAGNOSIS — E55.9 VITAMIN D DEFICIENCY: ICD-10-CM

## 2023-12-11 DIAGNOSIS — E11.9 TYPE 2 DIABETES MELLITUS WITHOUT COMPLICATION, WITHOUT LONG-TERM CURRENT USE OF INSULIN (HCC): ICD-10-CM

## 2023-12-11 PROBLEM — M54.12 CERVICAL RADICULOPATHY: Status: ACTIVE | Noted: 2023-05-01

## 2023-12-11 PROBLEM — M48.061 FORAMINAL STENOSIS OF LUMBAR REGION: Status: ACTIVE | Noted: 2017-08-30

## 2023-12-11 PROBLEM — Z90.13 STATUS POST BILATERAL MASTECTOMY: Status: ACTIVE | Noted: 2022-05-14

## 2023-12-11 PROBLEM — M54.31 BILATERAL SCIATICA: Status: ACTIVE | Noted: 2017-08-30

## 2023-12-11 PROBLEM — M43.10 ACQUIRED SPONDYLOLISTHESIS: Status: ACTIVE | Noted: 2017-08-30

## 2023-12-11 PROBLEM — M48.062 SPINAL STENOSIS, LUMBAR REGION, WITH NEUROGENIC CLAUDICATION: Status: ACTIVE | Noted: 2017-06-28

## 2023-12-11 PROBLEM — M51.26 DISPLACEMENT OF LUMBAR INTERVERTEBRAL DISC WITHOUT MYELOPATHY: Status: ACTIVE | Noted: 2017-08-30

## 2023-12-11 PROBLEM — M54.32 BILATERAL SCIATICA: Status: ACTIVE | Noted: 2017-08-30

## 2023-12-11 PROBLEM — M47.817 LUMBOSACRAL SPONDYLOSIS WITHOUT MYELOPATHY: Status: ACTIVE | Noted: 2017-08-30

## 2023-12-11 PROBLEM — M47.22 OSTEOARTHRITIS OF SPINE WITH RADICULOPATHY, CERVICAL REGION: Status: ACTIVE | Noted: 2017-09-20

## 2023-12-11 PROCEDURE — 3079F DIAST BP 80-89 MM HG: CPT | Performed by: STUDENT IN AN ORGANIZED HEALTH CARE EDUCATION/TRAINING PROGRAM

## 2023-12-11 PROCEDURE — 3074F SYST BP LT 130 MM HG: CPT | Performed by: STUDENT IN AN ORGANIZED HEALTH CARE EDUCATION/TRAINING PROGRAM

## 2023-12-11 PROCEDURE — 99204 OFFICE O/P NEW MOD 45 MIN: CPT | Performed by: STUDENT IN AN ORGANIZED HEALTH CARE EDUCATION/TRAINING PROGRAM

## 2023-12-11 PROCEDURE — 3044F HG A1C LEVEL LT 7.0%: CPT | Performed by: STUDENT IN AN ORGANIZED HEALTH CARE EDUCATION/TRAINING PROGRAM

## 2023-12-11 RX ORDER — NALOXONE HYDROCHLORIDE 4 MG/.1ML
1 SPRAY NASAL PRN
COMMUNITY

## 2023-12-11 RX ORDER — LORATADINE 10 MG/1
10 CAPSULE, LIQUID FILLED ORAL DAILY
COMMUNITY

## 2023-12-11 RX ORDER — FEZOLINETANT 45 MG/1
TABLET, FILM COATED ORAL
COMMUNITY

## 2023-12-11 RX ORDER — VENLAFAXINE 25 MG/1
225 TABLET ORAL 3 TIMES DAILY
COMMUNITY
End: 2023-12-11

## 2023-12-11 RX ORDER — NITROFURANTOIN MACROCRYSTALS 50 MG/1
50 CAPSULE ORAL 4 TIMES DAILY
COMMUNITY
End: 2023-12-11

## 2023-12-11 RX ORDER — HYDROCODONE BITARTRATE AND ACETAMINOPHEN 10; 325 MG/1; MG/1
1 TABLET ORAL EVERY 6 HOURS PRN
COMMUNITY

## 2023-12-11 RX ORDER — SIMETHICONE 125 MG
250 CAPSULE ORAL DAILY PRN
COMMUNITY

## 2023-12-11 RX ORDER — LOPERAMIDE HYDROCHLORIDE 2 MG/1
2 CAPSULE ORAL 4 TIMES DAILY PRN
COMMUNITY

## 2023-12-11 RX ORDER — ONDANSETRON 4 MG/1
4 TABLET, FILM COATED ORAL EVERY 8 HOURS PRN
COMMUNITY

## 2023-12-11 RX ORDER — NAPROXEN 500 MG/1
500 TABLET ORAL 2 TIMES DAILY WITH MEALS
COMMUNITY

## 2023-12-11 RX ORDER — MORPHINE SULFATE 15 MG/1
15 TABLET ORAL EVERY 4 HOURS PRN
COMMUNITY
End: 2023-12-11

## 2023-12-11 RX ORDER — VENLAFAXINE HYDROCHLORIDE 75 MG/1
225 CAPSULE, EXTENDED RELEASE ORAL DAILY
COMMUNITY
Start: 2023-10-20

## 2023-12-11 RX ORDER — ROSUVASTATIN CALCIUM 10 MG/1
10 TABLET, COATED ORAL DAILY
COMMUNITY
End: 2023-12-11 | Stop reason: SDUPTHER

## 2023-12-11 RX ORDER — ONDANSETRON 8 MG/1
8 TABLET, ORALLY DISINTEGRATING ORAL EVERY 8 HOURS PRN
COMMUNITY
End: 2023-12-11

## 2023-12-11 RX ORDER — LATANOPROST 50 UG/ML
1 SOLUTION/ DROPS OPHTHALMIC NIGHTLY
COMMUNITY

## 2023-12-11 RX ORDER — GALCANEZUMAB 120 MG/ML
INJECTION, SOLUTION SUBCUTANEOUS
COMMUNITY

## 2023-12-11 RX ORDER — ROSUVASTATIN CALCIUM 10 MG/1
10 TABLET, COATED ORAL DAILY
Qty: 90 TABLET | Refills: 3 | Status: SHIPPED | OUTPATIENT
Start: 2023-12-11

## 2023-12-11 RX ORDER — NARATRIPTAN 2.5 MG/1
2.5 TABLET ORAL
COMMUNITY
Start: 2016-09-13

## 2023-12-11 RX ORDER — ARTIFICIAL TEARS 1; 2; 3 MG/ML; MG/ML; MG/ML
1 SOLUTION/ DROPS OPHTHALMIC EVERY 4 HOURS PRN
COMMUNITY

## 2023-12-11 RX ORDER — OLAPARIB 150 MG/1
TABLET, FILM COATED ORAL
COMMUNITY
End: 2023-12-11

## 2023-12-11 RX ORDER — LURASIDONE HYDROCHLORIDE 40 MG/1
TABLET, FILM COATED ORAL
COMMUNITY

## 2023-12-11 RX ORDER — LIRAGLUTIDE 6 MG/ML
0.6 INJECTION SUBCUTANEOUS DAILY
Qty: 2 ADJUSTABLE DOSE PRE-FILLED PEN SYRINGE | Refills: 3 | Status: SHIPPED | OUTPATIENT
Start: 2023-12-11

## 2023-12-11 RX ORDER — METHYLPHENIDATE HYDROCHLORIDE 40 MG/1
40 CAPSULE, EXTENDED RELEASE ORAL EVERY MORNING
COMMUNITY

## 2023-12-11 RX ORDER — AMLODIPINE BESYLATE 5 MG/1
5 TABLET ORAL DAILY
Qty: 90 TABLET | Refills: 3 | Status: SHIPPED | OUTPATIENT
Start: 2023-12-11

## 2023-12-11 RX ORDER — CYCLOBENZAPRINE HCL 10 MG
10 TABLET ORAL 3 TIMES DAILY PRN
COMMUNITY

## 2023-12-11 RX ORDER — OLAPARIB 150 MG/1
2 TABLET, FILM COATED ORAL 2 TIMES DAILY
COMMUNITY

## 2023-12-11 RX ORDER — MORPHINE SULFATE 15 MG/1
15 TABLET, FILM COATED, EXTENDED RELEASE ORAL 2 TIMES DAILY
COMMUNITY
Start: 2023-11-02

## 2023-12-11 RX ORDER — PHENAZOPYRIDINE HYDROCHLORIDE 95 MG/1
95 TABLET ORAL 3 TIMES DAILY PRN
COMMUNITY

## 2023-12-11 RX ORDER — LIDOCAINE AND PRILOCAINE 25; 25 MG/G; MG/G
CREAM TOPICAL PRN
COMMUNITY
End: 2023-12-11

## 2023-12-11 RX ORDER — FLUCONAZOLE 100 MG/1
100 TABLET ORAL DAILY
COMMUNITY

## 2023-12-11 RX ORDER — PROCHLORPERAZINE MALEATE 10 MG
10 TABLET ORAL EVERY 6 HOURS PRN
COMMUNITY

## 2023-12-11 RX ORDER — DOCUSATE SODIUM 100 MG/1
100 CAPSULE, LIQUID FILLED ORAL 2 TIMES DAILY
COMMUNITY

## 2023-12-11 RX ORDER — METHYLPHENIDATE HYDROCHLORIDE 10 MG/1
10 TABLET ORAL 2 TIMES DAILY
COMMUNITY

## 2023-12-11 RX ORDER — AMLODIPINE BESYLATE 5 MG/1
5 TABLET ORAL DAILY
COMMUNITY
End: 2023-12-11 | Stop reason: SDUPTHER

## 2023-12-11 RX ORDER — NARATRIPTAN 1 MG/1
1 TABLET ORAL ONCE
COMMUNITY

## 2023-12-11 RX ORDER — ANASTROZOLE 1 MG/1
1 TABLET ORAL DAILY
COMMUNITY

## 2023-12-11 RX ORDER — COVID-19 ANTIGEN TEST
KIT MISCELLANEOUS
COMMUNITY
End: 2023-12-11

## 2023-12-11 SDOH — ECONOMIC STABILITY: FOOD INSECURITY: WITHIN THE PAST 12 MONTHS, YOU WORRIED THAT YOUR FOOD WOULD RUN OUT BEFORE YOU GOT MONEY TO BUY MORE.: NEVER TRUE

## 2023-12-11 SDOH — ECONOMIC STABILITY: HOUSING INSECURITY
IN THE LAST 12 MONTHS, WAS THERE A TIME WHEN YOU DID NOT HAVE A STEADY PLACE TO SLEEP OR SLEPT IN A SHELTER (INCLUDING NOW)?: NO

## 2023-12-11 SDOH — ECONOMIC STABILITY: INCOME INSECURITY: HOW HARD IS IT FOR YOU TO PAY FOR THE VERY BASICS LIKE FOOD, HOUSING, MEDICAL CARE, AND HEATING?: NOT HARD AT ALL

## 2023-12-11 SDOH — ECONOMIC STABILITY: FOOD INSECURITY: WITHIN THE PAST 12 MONTHS, THE FOOD YOU BOUGHT JUST DIDN'T LAST AND YOU DIDN'T HAVE MONEY TO GET MORE.: NEVER TRUE

## 2023-12-11 ASSESSMENT — PATIENT HEALTH QUESTIONNAIRE - PHQ9
SUM OF ALL RESPONSES TO PHQ QUESTIONS 1-9: 0
2. FEELING DOWN, DEPRESSED OR HOPELESS: 0
SUM OF ALL RESPONSES TO PHQ9 QUESTIONS 1 & 2: 0
SUM OF ALL RESPONSES TO PHQ QUESTIONS 1-9: 0
SUM OF ALL RESPONSES TO PHQ QUESTIONS 1-9: 0
1. LITTLE INTEREST OR PLEASURE IN DOING THINGS: 0
SUM OF ALL RESPONSES TO PHQ QUESTIONS 1-9: 0

## 2023-12-11 ASSESSMENT — ENCOUNTER SYMPTOMS
DIARRHEA: 0
BACK PAIN: 1
ABDOMINAL PAIN: 0
CONSTIPATION: 0
SHORTNESS OF BREATH: 0
COUGH: 0
VOMITING: 0
BLOOD IN STOOL: 0
NAUSEA: 0

## 2023-12-11 NOTE — PATIENT INSTRUCTIONS
Records from:  Eye doctor  GI doctor/colonscopy  Women [facility] - pap smear    For Victoza  Start at 0.6 mg daily.

## 2023-12-12 LAB
25(OH)D3 SERPL-MCNC: 25.3 NG/ML (ref 30–100)
CREAT UR-MCNC: 203 MG/DL
EST. AVERAGE GLUCOSE BLD GHB EST-MCNC: 120 MG/DL
HBA1C MFR BLD: 5.8 % (ref 4–5.6)
HIV 1+2 AB+HIV1 P24 AG SERPL QL IA: NONREACTIVE
HIV 1/2 RESULT COMMENT: NORMAL
MICROALBUMIN UR-MCNC: 1.33 MG/DL
MICROALBUMIN/CREAT UR-RTO: 7 MG/G (ref 0–30)

## 2023-12-13 LAB
HCV AB SERPL QL IA: NORMAL
HCV IGG SERPL QL IA: NON REACTIVE S/CO RATIO

## 2023-12-13 RX ORDER — PEN NEEDLE, DIABETIC 30 GX3/16"
NEEDLE, DISPOSABLE MISCELLANEOUS
Qty: 100 EACH | Refills: 1 | Status: SHIPPED | OUTPATIENT
Start: 2023-12-13

## 2023-12-13 NOTE — TELEPHONE ENCOUNTER
New patient seen 12/11/23. [facility] sending request for pen needles rx for Victoza. Entered 32g pen needle if appropriate. Thanks, Taylor Barrera    Last appointment: 12/11/23 MD Nghia Ospina  Next appointment: None  Previous refill encounter(s): historical provider- Kelsey retired. Requested Prescriptions     Pending Prescriptions Disp Refills    Insulin Pen Needle (PEN NEEDLES) 32G X 4 MM MISC 100 each 1     Sig: Use as directed daily with Victoza. For Pharmacy Admin Tracking Only    Program: Medication Refill  CPA in place:    Recommendation Provided To:    Intervention Detail: New Rx: 1, reason: Patient Preference  Intervention Accepted By:   Tresa Martin Closed?:    Time Spent (min): 5

## 2023-12-14 NOTE — RESULT ENCOUNTER NOTE
Called patient via phone. Confirmed name and  of patient. Labs/tests reviewed with patient via phone. See below. Patient A1c is improved from 6.3 to 5.8. Still prediabetic but buetter. Patient is still vitamin D deficient at 25.3, but she just recently started vitamin D supplementation. Encouraged her to continue. Otherwise, labs were unremarkable (no HIV/HepC, no diabetic kidney disease)    Patient has no further questions. Patient needs appointment in about 4 weeks for follow-up. Please schedule the patient.     Laura Khoury MD

## 2024-03-11 ENCOUNTER — HOSPITAL ENCOUNTER (OUTPATIENT)
Age: 64
Discharge: HOME | End: 2024-03-14
Payer: MEDICAID

## 2024-03-11 DIAGNOSIS — Z85.3 PERSONAL HISTORY OF BREAST CANCER: ICD-10-CM

## 2024-03-11 DIAGNOSIS — Z90.13: ICD-10-CM

## 2024-03-11 PROCEDURE — 6360000004 HC RX CONTRAST MEDICATION: Performed by: RADIOLOGY

## 2024-03-11 PROCEDURE — 74174 CTA ABD&PLVS W/CONTRAST: CPT

## 2024-03-11 RX ADMIN — IOPAMIDOL 100 ML: 755 INJECTION, SOLUTION INTRAVENOUS at 09:57

## 2024-04-26 ENCOUNTER — TELEPHONE (OUTPATIENT)
Age: 64
End: 2024-04-26

## 2024-04-26 NOTE — TELEPHONE ENCOUNTER
Pt called in stated she is not losing weight on the victoza 18mg  she also states she fell broke her left leg and she will not be losing weight like she need to and is asking for another Diabetic  to medication to lose the weight. And she have surgery in August for breast cancer   You can reach her through my chart.

## 2024-04-30 DIAGNOSIS — E66.01 MORBID OBESITY WITH BMI OF 40.0-44.9, ADULT (HCC): ICD-10-CM

## 2024-04-30 DIAGNOSIS — E11.9 TYPE 2 DIABETES MELLITUS WITHOUT COMPLICATION, WITHOUT LONG-TERM CURRENT USE OF INSULIN (HCC): Primary | ICD-10-CM

## 2024-04-30 RX ORDER — SEMAGLUTIDE 2.68 MG/ML
2 INJECTION, SOLUTION SUBCUTANEOUS
Qty: 3 ML | Refills: 5 | Status: SHIPPED | OUTPATIENT
Start: 2024-04-30 | End: 2024-05-03

## 2024-05-01 ENCOUNTER — TELEPHONE (OUTPATIENT)
Age: 64
End: 2024-05-01

## 2024-05-01 DIAGNOSIS — E11.9 TYPE 2 DIABETES MELLITUS WITHOUT COMPLICATION, WITHOUT LONG-TERM CURRENT USE OF INSULIN (HCC): ICD-10-CM

## 2024-05-01 DIAGNOSIS — E66.01 MORBID OBESITY WITH BMI OF 40.0-44.9, ADULT (HCC): ICD-10-CM

## 2024-05-01 NOTE — TELEPHONE ENCOUNTER
Per [id number] via fax the Ozempic 8 mg/3 ml is not covered by pt's insurance plan. Suggested alternatives are:  - Byetta 5 mcg Pen,   - Victoza 2-kathy 18 mg/3 ml pen. (Pt has a hx of this med)     Please prescribe alternative medication or obtain a Prior Authorization. Thank you.     For Pharmacy Admin Tracking Only    Program: Medication Refill  Intervention Detail: New Rx: 1, reason: Patient Preference  Time Spent (min): 5    Requested Prescriptions     Pending Prescriptions Disp Refills    semaglutide, 2 MG/DOSE, (OZEMPIC, 2 MG/DOSE,) 8 MG/3ML SOPN sc injection 3 mL 5     Sig: Inject 2 mg into the skin every 7 days

## 2024-05-03 DIAGNOSIS — E11.9 TYPE 2 DIABETES MELLITUS WITHOUT COMPLICATION, WITHOUT LONG-TERM CURRENT USE OF INSULIN (HCC): Primary | ICD-10-CM

## 2024-05-03 RX ORDER — DULAGLUTIDE 4.5 MG/.5ML
4.5 INJECTION, SOLUTION SUBCUTANEOUS WEEKLY
Qty: 2 ML | Refills: 5 | Status: SHIPPED | OUTPATIENT
Start: 2024-05-03

## 2024-05-03 NOTE — PROGRESS NOTES
Patient's insurance requires trying Trulicity or Byetta first prior to being able to start tier 2 medication such as Ozempic. Will start Trulicity.    Rosa Velasco MD

## 2024-05-07 DIAGNOSIS — E11.9 TYPE 2 DIABETES MELLITUS WITHOUT COMPLICATION, WITHOUT LONG-TERM CURRENT USE OF INSULIN (HCC): Primary | ICD-10-CM

## 2024-05-07 RX ORDER — DULAGLUTIDE 3 MG/.5ML
3 INJECTION, SOLUTION SUBCUTANEOUS WEEKLY
Qty: 2 ML | Refills: 5 | Status: SHIPPED | OUTPATIENT
Start: 2024-05-07

## 2024-05-13 RX ORDER — DULAGLUTIDE 1.5 MG/.5ML
1.5 INJECTION, SOLUTION SUBCUTANEOUS
Qty: 2 ML | Refills: 5 | Status: SHIPPED | OUTPATIENT
Start: 2024-05-13

## 2024-05-14 DIAGNOSIS — E11.9 TYPE 2 DIABETES MELLITUS WITHOUT COMPLICATION, WITHOUT LONG-TERM CURRENT USE OF INSULIN (HCC): Primary | ICD-10-CM

## 2024-05-14 RX ORDER — LANCETS 30 GAUGE
EACH MISCELLANEOUS
Qty: 100 EACH | Refills: 3 | Status: SHIPPED | OUTPATIENT
Start: 2024-05-14

## 2024-05-14 RX ORDER — GLUCOSAMINE HCL/CHONDROITIN SU 500-400 MG
CAPSULE ORAL
Qty: 100 STRIP | Refills: 3 | Status: SHIPPED | OUTPATIENT
Start: 2024-05-14

## 2024-05-14 NOTE — PROGRESS NOTES
Patient requests Glucometer and testing supplies. Will send those in.    Orders Placed This Encounter    Lancets MISC     Sig: Test fasting glucose daily; Dx E11.8; Pharmacist to choose based on insurance/glucose monitoring kit type     Dispense:  100 each     Refill:  3    blood glucose monitor strips     Sig: Test fasting glucose daily; Dx E11.8; Pharmacist to choose based on insurance/glucose monitoring kit type     Dispense:  100 strip     Refill:  3    Blood Glucose Monitoring Suppl w/Device KIT     Sig: Test fasting glucose daily once fasting in the morning; Dx E11.8; Pharmacist to choose based on insurance/glucose monitoring kit type. Patient has poor blood sugar control and needs monitoring at home.     Dispense:  1 kit     Refill:  0       Rosa Velasco MD

## 2024-08-22 ENCOUNTER — OFFICE VISIT (OUTPATIENT)
Age: 64
End: 2024-08-22
Payer: MEDICAID

## 2024-08-22 VITALS
RESPIRATION RATE: 16 BRPM | BODY MASS INDEX: 35.68 KG/M2 | HEIGHT: 66 IN | HEART RATE: 109 BPM | WEIGHT: 222 LBS | OXYGEN SATURATION: 96 % | SYSTOLIC BLOOD PRESSURE: 115 MMHG | DIASTOLIC BLOOD PRESSURE: 76 MMHG | TEMPERATURE: 99.1 F

## 2024-08-22 DIAGNOSIS — Z12.11 SCREENING FOR COLORECTAL CANCER: ICD-10-CM

## 2024-08-22 DIAGNOSIS — I10 ESSENTIAL HYPERTENSION: ICD-10-CM

## 2024-08-22 DIAGNOSIS — Z17.0 MALIGNANT NEOPLASM OF BREAST IN FEMALE, ESTROGEN RECEPTOR POSITIVE, UNSPECIFIED LATERALITY, UNSPECIFIED SITE OF BREAST (HCC): ICD-10-CM

## 2024-08-22 DIAGNOSIS — E66.01 MORBID OBESITY WITH BMI OF 40.0-44.9, ADULT (HCC): ICD-10-CM

## 2024-08-22 DIAGNOSIS — C50.919 MALIGNANT NEOPLASM OF BREAST IN FEMALE, ESTROGEN RECEPTOR POSITIVE, UNSPECIFIED LATERALITY, UNSPECIFIED SITE OF BREAST (HCC): ICD-10-CM

## 2024-08-22 DIAGNOSIS — R00.0 SINUS TACHYCARDIA: Primary | ICD-10-CM

## 2024-08-22 DIAGNOSIS — Z12.12 SCREENING FOR COLORECTAL CANCER: ICD-10-CM

## 2024-08-22 DIAGNOSIS — E78.00 ELEVATED CHOLESTEROL: ICD-10-CM

## 2024-08-22 DIAGNOSIS — Z23 ENCOUNTER FOR IMMUNIZATION: ICD-10-CM

## 2024-08-22 DIAGNOSIS — E11.9 TYPE 2 DIABETES MELLITUS WITHOUT COMPLICATION, WITHOUT LONG-TERM CURRENT USE OF INSULIN (HCC): ICD-10-CM

## 2024-08-22 DIAGNOSIS — E55.9 VITAMIN D DEFICIENCY: ICD-10-CM

## 2024-08-22 PROBLEM — D49.9 ESTROGEN RECEPTOR POSITIVE NEOPLASM: Status: ACTIVE | Noted: 2024-05-07

## 2024-08-22 PROBLEM — F33.41 RECURRENT MAJOR DEPRESSION IN PARTIAL REMISSION (HCC): Status: ACTIVE | Noted: 2024-05-07

## 2024-08-22 PROCEDURE — 99214 OFFICE O/P EST MOD 30 MIN: CPT | Performed by: STUDENT IN AN ORGANIZED HEALTH CARE EDUCATION/TRAINING PROGRAM

## 2024-08-22 PROCEDURE — 3078F DIAST BP <80 MM HG: CPT | Performed by: STUDENT IN AN ORGANIZED HEALTH CARE EDUCATION/TRAINING PROGRAM

## 2024-08-22 PROCEDURE — 3074F SYST BP LT 130 MM HG: CPT | Performed by: STUDENT IN AN ORGANIZED HEALTH CARE EDUCATION/TRAINING PROGRAM

## 2024-08-22 PROCEDURE — 90715 TDAP VACCINE 7 YRS/> IM: CPT | Performed by: STUDENT IN AN ORGANIZED HEALTH CARE EDUCATION/TRAINING PROGRAM

## 2024-08-22 PROCEDURE — PBSHW TDAP, BOOSTRIX, (AGE 10 YRS+), IM: Performed by: STUDENT IN AN ORGANIZED HEALTH CARE EDUCATION/TRAINING PROGRAM

## 2024-08-22 PROCEDURE — 90677 PCV20 VACCINE IM: CPT | Performed by: STUDENT IN AN ORGANIZED HEALTH CARE EDUCATION/TRAINING PROGRAM

## 2024-08-22 PROCEDURE — PBSHW PNEUMOCOCCAL, PCV20, PREVNAR 20, (AGE 6W+), IM, PF: Performed by: STUDENT IN AN ORGANIZED HEALTH CARE EDUCATION/TRAINING PROGRAM

## 2024-08-22 RX ORDER — VALACYCLOVIR HYDROCHLORIDE 500 MG/1
500 TABLET, FILM COATED ORAL DAILY
COMMUNITY
Start: 2024-07-12

## 2024-08-22 RX ORDER — DULAGLUTIDE 4.5 MG/.5ML
4.5 INJECTION, SOLUTION SUBCUTANEOUS WEEKLY
Qty: 2 ML | Refills: 5 | Status: SHIPPED | OUTPATIENT
Start: 2024-08-22

## 2024-08-22 RX ORDER — DULAGLUTIDE 4.5 MG/.5ML
4.5 INJECTION, SOLUTION SUBCUTANEOUS WEEKLY
COMMUNITY
Start: 2024-08-19 | End: 2024-08-22 | Stop reason: SDUPTHER

## 2024-08-22 RX ORDER — QUETIAPINE FUMARATE 100 MG/1
100 TABLET, FILM COATED ORAL 2 TIMES DAILY
COMMUNITY
Start: 2024-08-13

## 2024-08-22 SDOH — ECONOMIC STABILITY: FOOD INSECURITY: WITHIN THE PAST 12 MONTHS, THE FOOD YOU BOUGHT JUST DIDN'T LAST AND YOU DIDN'T HAVE MONEY TO GET MORE.: NEVER TRUE

## 2024-08-22 SDOH — ECONOMIC STABILITY: FOOD INSECURITY: WITHIN THE PAST 12 MONTHS, YOU WORRIED THAT YOUR FOOD WOULD RUN OUT BEFORE YOU GOT MONEY TO BUY MORE.: NEVER TRUE

## 2024-08-22 SDOH — ECONOMIC STABILITY: INCOME INSECURITY: HOW HARD IS IT FOR YOU TO PAY FOR THE VERY BASICS LIKE FOOD, HOUSING, MEDICAL CARE, AND HEATING?: NOT HARD AT ALL

## 2024-08-22 ASSESSMENT — PATIENT HEALTH QUESTIONNAIRE - PHQ9
SUM OF ALL RESPONSES TO PHQ QUESTIONS 1-9: 0
SUM OF ALL RESPONSES TO PHQ9 QUESTIONS 1 & 2: 0
2. FEELING DOWN, DEPRESSED OR HOPELESS: NOT AT ALL
SUM OF ALL RESPONSES TO PHQ QUESTIONS 1-9: 0
SUM OF ALL RESPONSES TO PHQ9 QUESTIONS 1 & 2: 0
1. LITTLE INTEREST OR PLEASURE IN DOING THINGS: NOT AT ALL
2. FEELING DOWN, DEPRESSED OR HOPELESS: NOT AT ALL
8. MOVING OR SPEAKING SO SLOWLY THAT OTHER PEOPLE COULD HAVE NOTICED. OR THE OPPOSITE, BEING SO FIGETY OR RESTLESS THAT YOU HAVE BEEN MOVING AROUND A LOT MORE THAN USUAL: NOT AT ALL
SUM OF ALL RESPONSES TO PHQ QUESTIONS 1-9: 0
3. TROUBLE FALLING OR STAYING ASLEEP: NOT AT ALL
7. TROUBLE CONCENTRATING ON THINGS, SUCH AS READING THE NEWSPAPER OR WATCHING TELEVISION: NOT AT ALL
SUM OF ALL RESPONSES TO PHQ QUESTIONS 1-9: 0
SUM OF ALL RESPONSES TO PHQ QUESTIONS 1-9: 0
1. LITTLE INTEREST OR PLEASURE IN DOING THINGS: NOT AT ALL
4. FEELING TIRED OR HAVING LITTLE ENERGY: NOT AT ALL
5. POOR APPETITE OR OVEREATING: NOT AT ALL
6. FEELING BAD ABOUT YOURSELF - OR THAT YOU ARE A FAILURE OR HAVE LET YOURSELF OR YOUR FAMILY DOWN: NOT AT ALL
9. THOUGHTS THAT YOU WOULD BE BETTER OFF DEAD, OR OF HURTING YOURSELF: NOT AT ALL

## 2024-08-22 ASSESSMENT — ENCOUNTER SYMPTOMS
BLOOD IN STOOL: 0
CONSTIPATION: 0
NAUSEA: 0
ABDOMINAL PAIN: 0
COUGH: 0
SHORTNESS OF BREATH: 0
VOMITING: 0
BACK PAIN: 1
DIARRHEA: 0

## 2024-08-22 NOTE — PROGRESS NOTES
Nyasia Rodriguez (:  1960) is a 64 y.o. female, New patient, here for evaluation of the following chief complaint(s):  Other (The patient stated she did not take the Ritlin today. She said her resting pulse  has been like 110.)        Subjective   SUBJECTIVE/OBJECTIVE:  Patient presents today for follow-up for the following    Acute concerns:  Increased HR - noticed more recently  - no chest pain or palpitations, no syncope  - Is on Ritalin - did not take today.  - Is also on multiple serotonergic medications  - Does not drink much water daily and does have some dizziness when going from laying down to standing.    Chronic problems:  Type 2 diabetes: On Trulicity 4.5 mg qWeekly - last A1c good on 23 at 5.8  Hypertension: on Amlodipine 5 mg daily  Hyperlipidemia: on Crestor 10 mg daily.  Recent fracture of fibula - sees [facility] - PT  Breast cancer - BRCA2+, history of oophrectomy and salpingectomy bilaterally, s/p bilateral mastectomy with implant on one side and seroma on the other. Patient sees.Dr. Hernan Zaman with [facility]. Patient is s/p lynparza. She is on Arimidex and Veozah for hot flashes  Lymphedema on the R side - 17 lymph nodes removed, 12 had cancer   Migraine headaches: on Amerge 2.5 mg daily - sees. Dr. Christopher Lund (neurologist). On Emgality 120 mg every 28 days.  Depression: On Effexor 225 mg daily, Seroquel 200 mg at bedtime - Sees psychiatry - Anel James with [facility]   Difficulty with concentration - patient noted to be on Ritalin 40 mg in morning and up to 3, 10 mg tablet later on, average 50 mg daily - Dr. Lund (neurology)    Preventative care:  Health Maintenance Due   Topic Date Due    Pneumococcal 0-64 years Vaccine (1 of 2 - PCV) Never done    Lipids  Never done    Diabetic retinal exam  Never done    Cervical cancer screen  Never done    Colorectal Cancer Screen  Never done    Shingles vaccine (1 of 2) Never done    Respiratory

## 2024-08-22 NOTE — PROGRESS NOTES
RM:`13    Chief Complaint   Patient presents with    Other     The patient stated she did not take the Ritlin today. She said her resting pulse  has been like 110.       Fasting No    Vitals:    08/22/24 1342   BP: 115/76   Site: Left Upper Arm   Position: Sitting   Cuff Size: Large Adult   Pulse: (!) 109   Resp: 16   Temp: 99.1 °F (37.3 °C)   TempSrc: Oral   SpO2: 96%   Weight: 100.7 kg (222 lb)   Height: 1.676 m (5' 6\")             No data to display                \"Have you been to the ER, urgent care clinic since your last visit?  Hospitalized since your last visit?\"    NO    “Have you seen or consulted any other health care providers outside of [facility] since your last visit?”    YES - When: approximately 10  weeks ago.  Where and Why: Oncology, Pain management, and PT.     “Have you had a pap smear?”    NO    No cervical cancer screening on file         “Have you had a colorectal cancer screening such as a colonoscopy/FIT/Cologuard?    NO    No colonoscopy on file  No cologuard on file  No FIT/FOBT on file   No flexible sigmoidoscopy on file         Click Here for Release of Records Request   AVS  education, follow up, and recommendations provided and addressed with patient.  services used to advise patient

## 2024-08-23 LAB
25(OH)D3 SERPL-MCNC: 33.9 NG/ML (ref 30–100)
ANION GAP SERPL CALC-SCNC: 2 MMOL/L (ref 5–15)
BUN SERPL-MCNC: 11 MG/DL (ref 6–20)
BUN/CREAT SERPL: 16 (ref 12–20)
CALCIUM SERPL-MCNC: 9 MG/DL (ref 8.5–10.1)
CHLORIDE SERPL-SCNC: 112 MMOL/L (ref 97–108)
CHOLEST SERPL-MCNC: 156 MG/DL
CO2 SERPL-SCNC: 28 MMOL/L (ref 21–32)
CREAT SERPL-MCNC: 0.7 MG/DL (ref 0.55–1.02)
EST. AVERAGE GLUCOSE BLD GHB EST-MCNC: 97 MG/DL
GLUCOSE SERPL-MCNC: 143 MG/DL (ref 65–100)
HBA1C MFR BLD: 5 % (ref 4–5.6)
HDLC SERPL-MCNC: 63 MG/DL
HDLC SERPL: 2.5 (ref 0–5)
LDLC SERPL CALC-MCNC: 71.6 MG/DL (ref 0–100)
POTASSIUM SERPL-SCNC: 4.4 MMOL/L (ref 3.5–5.1)
SODIUM SERPL-SCNC: 142 MMOL/L (ref 136–145)
TRIGL SERPL-MCNC: 107 MG/DL
VLDLC SERPL CALC-MCNC: 21.4 MG/DL

## 2024-09-24 DIAGNOSIS — R19.5 POSITIVE COLORECTAL CANCER SCREENING USING COLOGUARD TEST: Primary | ICD-10-CM

## 2024-09-24 LAB — NONINV COLON CA DNA+OCC BLD SCRN STL QL: POSITIVE

## 2024-09-24 NOTE — ROUTINE PROCESS
TRANSFER - IN REPORT:    Verbal report received from Ethan Malhotra RN(name) on Josesito Sanders  being received from OR(unit) for routine post - op      Report consisted of patients Situation, Background, Assessment and   Recommendations(SBAR). Information from the following report(s) OR Summary was reviewed with the receiving nurse. Opportunity for questions and clarification was provided. Assessment completed upon patients arrival to unit and care assumed. No

## 2024-10-29 ENCOUNTER — TELEPHONE (OUTPATIENT)
Age: 64
End: 2024-10-29

## 2024-10-29 NOTE — TELEPHONE ENCOUNTER
----- Message from Princess MARTINES sent at 10/29/2024  2:59 PM EDT -----  Regarding: [facility] Referral Request  [facility] Referral Request    Reason for referral request: Specialty Provider    Specialist/Lab/Test patient is requesting (if known): Colonoscopy Specialist Doctor     Specialist Phone Number (if applicable):     Additional Information : colonoscopy positive   and need a referral   --------------------------------------------------------------------------------------------------------------------------    Relationship to Patient: Self     Call Back Information: OK to leave message on voicemail  Preferred Call Back Number: [phone]

## 2024-11-07 ENCOUNTER — TELEPHONE (OUTPATIENT)
Age: 64
End: 2024-11-07

## 2024-11-07 DIAGNOSIS — R19.5 POSITIVE COLORECTAL CANCER SCREENING USING COLOGUARD TEST: Primary | ICD-10-CM

## 2024-11-07 NOTE — TELEPHONE ENCOUNTER
The Gastroenterology group can't schedule appt: until 2025???? Patient needs a New Refer: for her colonoscopy  Last ov-8/22/24

## 2024-11-08 NOTE — TELEPHONE ENCOUNTER
Covering for Dr Velasco.   Personally reviewed chart with positive cologuard.   Renewed referral to another GI group. Pt to be contacted by clinical staff regarding the referral.

## 2024-11-19 ENCOUNTER — HOSPITAL ENCOUNTER (OUTPATIENT)
Facility: HOSPITAL | Age: 64
Discharge: HOME | End: 2024-11-22
Payer: MEDICAID

## 2024-11-19 VITALS
WEIGHT: 229.3 LBS | HEIGHT: 66 IN | TEMPERATURE: 97.9 F | HEART RATE: 104 BPM | DIASTOLIC BLOOD PRESSURE: 69 MMHG | RESPIRATION RATE: 18 BRPM | SYSTOLIC BLOOD PRESSURE: 138 MMHG | OXYGEN SATURATION: 100 % | BODY MASS INDEX: 36.85 KG/M2

## 2024-11-19 LAB
ABO + RH BLD: NORMAL
ALBUMIN SERPL-MCNC: 4.1 G/DL (ref 3.5–5)
ALBUMIN/GLOB SERPL: 1.2 (ref 1.1–2.2)
ALP SERPL-CCNC: 83 U/L (ref 45–117)
ALT SERPL-CCNC: 24 U/L (ref 12–78)
ANION GAP SERPL CALC-SCNC: 4 MMOL/L (ref 2–12)
AST SERPL-CCNC: 20 U/L (ref 15–37)
BASOPHILS # BLD: 0 K/UL (ref 0–0.1)
BASOPHILS NFR BLD: 0 % (ref 0–1)
BILIRUB SERPL-MCNC: 0.4 MG/DL (ref 0.2–1)
BLOOD GROUP ANTIBODIES SERPL: NORMAL
BUN SERPL-MCNC: 17 MG/DL (ref 6–20)
BUN/CREAT SERPL: 27 (ref 12–20)
CALCIUM SERPL-MCNC: 9 MG/DL (ref 8.5–10.1)
CHLORIDE SERPL-SCNC: 108 MMOL/L (ref 97–108)
CO2 SERPL-SCNC: 29 MMOL/L (ref 21–32)
CREAT SERPL-MCNC: 0.64 MG/DL (ref 0.55–1.02)
DIFFERENTIAL METHOD BLD: ABNORMAL
EKG ATRIAL RATE: 93 BPM
EKG DIAGNOSIS: NORMAL
EKG P AXIS: 64 DEGREES
EKG P-R INTERVAL: 132 MS
EKG Q-T INTERVAL: 364 MS
EKG QRS DURATION: 82 MS
EKG QTC CALCULATION (BAZETT): 452 MS
EKG R AXIS: -9 DEGREES
EKG T AXIS: 29 DEGREES
EKG VENTRICULAR RATE: 93 BPM
EOSINOPHIL # BLD: 0.1 K/UL (ref 0–0.4)
EOSINOPHIL NFR BLD: 2 % (ref 0–7)
ERYTHROCYTE [DISTWIDTH] IN BLOOD BY AUTOMATED COUNT: 16.4 % (ref 11.5–14.5)
GLOBULIN SER CALC-MCNC: 3.3 G/DL (ref 2–4)
GLUCOSE SERPL-MCNC: 98 MG/DL (ref 65–100)
HCT VFR BLD AUTO: 39.6 % (ref 35–47)
HGB BLD-MCNC: 12.6 G/DL (ref 11.5–16)
IMM GRANULOCYTES # BLD AUTO: 0 K/UL (ref 0–0.04)
IMM GRANULOCYTES NFR BLD AUTO: 0 % (ref 0–0.5)
INR PPP: 1 (ref 0.9–1.1)
LYMPHOCYTES # BLD: 1.9 K/UL (ref 0.8–3.5)
LYMPHOCYTES NFR BLD: 36 % (ref 12–49)
MCH RBC QN AUTO: 29.3 PG (ref 26–34)
MCHC RBC AUTO-ENTMCNC: 31.8 G/DL (ref 30–36.5)
MCV RBC AUTO: 92.1 FL (ref 80–99)
MONOCYTES # BLD: 0.3 K/UL (ref 0–1)
MONOCYTES NFR BLD: 6 % (ref 5–13)
NEUTS SEG # BLD: 2.9 K/UL (ref 1.8–8)
NEUTS SEG NFR BLD: 56 % (ref 32–75)
NRBC # BLD: 0 K/UL (ref 0–0.01)
NRBC BLD-RTO: 0 PER 100 WBC
PLATELET # BLD AUTO: 254 K/UL (ref 150–400)
PMV BLD AUTO: 9.9 FL (ref 8.9–12.9)
POTASSIUM SERPL-SCNC: 4.2 MMOL/L (ref 3.5–5.1)
PREALB SERPL-MCNC: 33.7 MG/DL (ref 20–40)
PROT SERPL-MCNC: 7.4 G/DL (ref 6.4–8.2)
PROTHROMBIN TIME: 10.5 SEC (ref 9–11.1)
RBC # BLD AUTO: 4.3 M/UL (ref 3.8–5.2)
SODIUM SERPL-SCNC: 141 MMOL/L (ref 136–145)
SPECIMEN EXP DATE BLD: NORMAL
WBC # BLD AUTO: 5.2 K/UL (ref 3.6–11)

## 2024-11-19 PROCEDURE — 84134 ASSAY OF PREALBUMIN: CPT

## 2024-11-19 PROCEDURE — 71046 X-RAY EXAM CHEST 2 VIEWS: CPT

## 2024-11-19 PROCEDURE — 36415 COLL VENOUS BLD VENIPUNCTURE: CPT

## 2024-11-19 PROCEDURE — 80053 COMPREHEN METABOLIC PANEL: CPT

## 2024-11-19 PROCEDURE — 86900 BLOOD TYPING SEROLOGIC ABO: CPT

## 2024-11-19 PROCEDURE — 93005 ELECTROCARDIOGRAM TRACING: CPT | Performed by: SURGERY

## 2024-11-19 PROCEDURE — 86901 BLOOD TYPING SEROLOGIC RH(D): CPT

## 2024-11-19 PROCEDURE — 85025 COMPLETE CBC W/AUTO DIFF WBC: CPT

## 2024-11-19 PROCEDURE — 85610 PROTHROMBIN TIME: CPT

## 2024-11-19 PROCEDURE — 86850 RBC ANTIBODY SCREEN: CPT

## 2024-11-19 RX ORDER — MELOXICAM 15 MG/1
15 TABLET ORAL EVERY EVENING
COMMUNITY

## 2024-11-19 RX ORDER — OLANZAPINE 5 MG/1
5 TABLET, ORALLY DISINTEGRATING ORAL NIGHTLY
COMMUNITY

## 2024-11-19 RX ORDER — FAMOTIDINE 20 MG
TABLET ORAL 2 TIMES DAILY
COMMUNITY

## 2024-11-19 ASSESSMENT — PAIN SCALES - GENERAL: PAINLEVEL_OUTOF10: 4

## 2024-11-19 NOTE — DISCHARGE INSTRUCTIONS
[facility]                   [address] [place] [address]   PRE-ADMISSION TESTING    [phone]     Surgery Date:  12/3 Tuesday     Is surgery arrival time given by surgeon?  NO  If “NO”, [place] staff will call you between 3 and 7pm the day before your surgery with your arrival time. (If your surgery is on a Monday, we will call you the Friday before.)    Call [phone] after 7pm Monday-Friday if you did not receive this call.    INSTRUCTIONS BEFORE YOUR SURGERY   When You  Arrive    Arrive at the 2nd Floor Admitting Desk on the day of your surgery     Have your insurance card, photo ID, and any copayment (if needed)   Food   and   Drink    No food or drink (gum, mints, coffee, juice, etc) after midnight the night before surgery.      You may drink WATER ONLY up until 2 hours prior to your surgery time. Please do not       add anything to your water as this may result in your surgery being postponed.        No alcohol (beer, wine, liquor) 24 hours before or after surgery.    Medications to TAKE Morning of Surgery      MEDICATIONS TO TAKE THE MORNING OF SURGERY:          Amlodipine         Effexor                  Last dose of Trulicity will be 11/23/24       Medications to STOP 7 Days Before Surgery Non-Steroidal anti-inflammatory Drugs (NSAID's): for example: Ibuprofen, Advil, Motrin, Naproxen, Aleve  Aspirin, if taking for pain  Herbal supplements, vitamins, and fish oil  Other: All vitamins and supplements, Mobic, Ibuprofen    Blood Thinners    If you take Aspirin, Plavix, Coumadin, or any blood-thinning or anti blood-clotting medicine, talk to the doctor who prescribed the medications for pre-operative instructions.    Bathing   Clothing  Jewelry  Valuables       If you shower the morning of surgery, please do not apply anything to your skin (lotions, powders, deodorant, or makeup, especially mascara)  Follow Chlorhexidine [employer] body wash instructions

## 2024-12-02 ENCOUNTER — ANESTHESIA EVENT (OUTPATIENT)
Facility: HOSPITAL | Age: 64
DRG: 363 | End: 2024-12-02
Payer: MEDICAID

## 2024-12-03 ENCOUNTER — ANESTHESIA (OUTPATIENT)
Facility: HOSPITAL | Age: 64
DRG: 363 | End: 2024-12-03
Payer: MEDICAID

## 2024-12-03 ENCOUNTER — HOSPITAL ENCOUNTER (INPATIENT)
Facility: HOSPITAL | Age: 64
LOS: 2 days | Discharge: HOME | DRG: 363 | End: 2024-12-05
Attending: SURGERY | Admitting: SURGERY
Payer: MEDICAID

## 2024-12-03 DIAGNOSIS — Z90.13 S/P BILATERAL MASTECTOMY: Primary | ICD-10-CM

## 2024-12-03 DIAGNOSIS — E11.9 TYPE 2 DIABETES MELLITUS WITHOUT COMPLICATION, WITHOUT LONG-TERM CURRENT USE OF INSULIN (HCC): ICD-10-CM

## 2024-12-03 PROBLEM — C50.919 BREAST CANCER IN FEMALE (HCC): Status: ACTIVE | Noted: 2024-12-03

## 2024-12-03 LAB
GLUCOSE BLD STRIP.AUTO-MCNC: 110 MG/DL (ref 65–117)
GLUCOSE BLD STRIP.AUTO-MCNC: 152 MG/DL (ref 65–117)
GLUCOSE BLD STRIP.AUTO-MCNC: 165 MG/DL (ref 65–117)
SERVICE CMNT-IMP: ABNORMAL
SERVICE CMNT-IMP: ABNORMAL
SERVICE CMNT-IMP: NORMAL

## 2024-12-03 PROCEDURE — 6360000002 HC RX W HCPCS: Performed by: SURGERY

## 2024-12-03 PROCEDURE — 0HRV077 REPLACEMENT OF BILATERAL BREAST USING DEEP INFERIOR EPIGASTRIC ARTERY PERFORATOR FLAP, OPEN APPROACH: ICD-10-PCS | Performed by: PLASTIC SURGERY

## 2024-12-03 PROCEDURE — 6360000002 HC RX W HCPCS: Performed by: NURSE ANESTHETIST, CERTIFIED REGISTERED

## 2024-12-03 PROCEDURE — 0HPU0JZ REMOVAL OF SYNTHETIC SUBSTITUTE FROM LEFT BREAST, OPEN APPROACH: ICD-10-PCS | Performed by: PLASTIC SURGERY

## 2024-12-03 PROCEDURE — C1760 CLOSURE DEV, VASC: HCPCS | Performed by: SURGERY

## 2024-12-03 PROCEDURE — 3700000000 HC ANESTHESIA ATTENDED CARE: Performed by: SURGERY

## 2024-12-03 PROCEDURE — 3600000015 HC SURGERY LEVEL 5 ADDTL 15MIN: Performed by: SURGERY

## 2024-12-03 PROCEDURE — 7100000001 HC PACU RECOVERY - ADDTL 15 MIN: Performed by: SURGERY

## 2024-12-03 PROCEDURE — 2580000003 HC RX 258: Performed by: SURGERY

## 2024-12-03 PROCEDURE — 3E0T3BZ INTRODUCTION OF ANESTHETIC AGENT INTO PERIPHERAL NERVES AND PLEXI, PERCUTANEOUS APPROACH: ICD-10-PCS | Performed by: PLASTIC SURGERY

## 2024-12-03 PROCEDURE — C9290 INJ, BUPIVACAINE LIPOSOME: HCPCS | Performed by: SURGERY

## 2024-12-03 PROCEDURE — 2580000003 HC RX 258: Performed by: NURSE ANESTHETIST, CERTIFIED REGISTERED

## 2024-12-03 PROCEDURE — 7100000000 HC PACU RECOVERY - FIRST 15 MIN: Performed by: SURGERY

## 2024-12-03 PROCEDURE — C1889 IMPLANT/INSERT DEVICE, NOC: HCPCS | Performed by: SURGERY

## 2024-12-03 PROCEDURE — 82962 GLUCOSE BLOOD TEST: CPT

## 2024-12-03 PROCEDURE — 2709999900 HC NON-CHARGEABLE SUPPLY: Performed by: SURGERY

## 2024-12-03 PROCEDURE — 2000000000 HC ICU R&B

## 2024-12-03 PROCEDURE — 2500000003 HC RX 250 WO HCPCS: Performed by: SURGERY

## 2024-12-03 PROCEDURE — 2500000003 HC RX 250 WO HCPCS: Performed by: NURSE ANESTHETIST, CERTIFIED REGISTERED

## 2024-12-03 PROCEDURE — 6370000000 HC RX 637 (ALT 250 FOR IP): Performed by: SURGERY

## 2024-12-03 PROCEDURE — 3700000001 HC ADD 15 MINUTES (ANESTHESIA): Performed by: SURGERY

## 2024-12-03 PROCEDURE — 3600000005 HC SURGERY LEVEL 5 BASE: Performed by: SURGERY

## 2024-12-03 PROCEDURE — 88305 TISSUE EXAM BY PATHOLOGIST: CPT

## 2024-12-03 DEVICE — THE GEM MICROVASCULAR ANASTOMOTIC COUPLER DEVICE RINGS ARE MADE OF POLYETHYLENE AND SURGICAL GRADE STAINLESS STEEL PINS. A PROTECTIVE COVER AND JAW ASSEMBLY PROTECT THE RINGS AND ALLOW FOR EASY LOADING ONTO THE ANASTOMOTIC INSTRUMENT. BOTH THE PROTECTIVE COVER AND JAW ASSEMBLY ARE DISPOSABLE. THE GEM MICROVASCULAR ANASTOMOTIC COUPLER DEVICE IS SINGLE-USE AND AVAILABLE IN VARIOUS SIZES
Type: IMPLANTABLE DEVICE | Site: BREAST | Status: FUNCTIONAL
Brand: GEM MICROVASCULAR ANASTOMOTIC COUPLER

## 2024-12-03 DEVICE — CLIP HEMSTAT TI CHEVRON SHP INTLOK ATRAUM TEETH MICROCLP: Type: IMPLANTABLE DEVICE | Site: BREAST | Status: FUNCTIONAL

## 2024-12-03 DEVICE — CLIP SURG INT USE TI HEMSTAT MICROCLP: Type: IMPLANTABLE DEVICE | Site: BREAST | Status: FUNCTIONAL

## 2024-12-03 RX ORDER — PAPAVERINE HYDROCHLORIDE 30 MG/ML
INJECTION INTRAMUSCULAR; INTRAVENOUS PRN
Status: DISCONTINUED | OUTPATIENT
Start: 2024-12-03 | End: 2024-12-03 | Stop reason: ALTCHOICE

## 2024-12-03 RX ORDER — HYDROMORPHONE HYDROCHLORIDE 2 MG/1
1 TABLET ORAL
Status: DISCONTINUED | OUTPATIENT
Start: 2024-12-03 | End: 2024-12-05 | Stop reason: HOSPADM

## 2024-12-03 RX ORDER — SENNA AND DOCUSATE SODIUM 50; 8.6 MG/1; MG/1
1 TABLET, FILM COATED ORAL 2 TIMES DAILY
Status: DISCONTINUED | OUTPATIENT
Start: 2024-12-03 | End: 2024-12-05 | Stop reason: HOSPADM

## 2024-12-03 RX ORDER — VALACYCLOVIR HYDROCHLORIDE 500 MG/1
500 TABLET, FILM COATED ORAL
Status: DISCONTINUED | OUTPATIENT
Start: 2024-12-03 | End: 2024-12-05 | Stop reason: HOSPADM

## 2024-12-03 RX ORDER — FAMOTIDINE 10 MG/ML
INJECTION, SOLUTION INTRAVENOUS
Status: DISCONTINUED | OUTPATIENT
Start: 2024-12-03 | End: 2024-12-03 | Stop reason: SDUPTHER

## 2024-12-03 RX ORDER — ONDANSETRON 2 MG/ML
4 INJECTION INTRAMUSCULAR; INTRAVENOUS
Status: DISCONTINUED | OUTPATIENT
Start: 2024-12-03 | End: 2024-12-03 | Stop reason: HOSPADM

## 2024-12-03 RX ORDER — ONDANSETRON 2 MG/ML
INJECTION INTRAMUSCULAR; INTRAVENOUS
Status: DISCONTINUED | OUTPATIENT
Start: 2024-12-03 | End: 2024-12-03 | Stop reason: SDUPTHER

## 2024-12-03 RX ORDER — MIDAZOLAM HYDROCHLORIDE 1 MG/ML
INJECTION, SOLUTION INTRAMUSCULAR; INTRAVENOUS
Status: DISCONTINUED | OUTPATIENT
Start: 2024-12-03 | End: 2024-12-03 | Stop reason: SDUPTHER

## 2024-12-03 RX ORDER — HEPARIN SODIUM 10000 [USP'U]/ML
INJECTION, SOLUTION INTRAVENOUS; SUBCUTANEOUS
Status: DISCONTINUED | OUTPATIENT
Start: 2024-12-03 | End: 2024-12-03 | Stop reason: SDUPTHER

## 2024-12-03 RX ORDER — MIDAZOLAM HYDROCHLORIDE 2 MG/2ML
2 INJECTION, SOLUTION INTRAMUSCULAR; INTRAVENOUS
Status: DISCONTINUED | OUTPATIENT
Start: 2024-12-03 | End: 2024-12-03 | Stop reason: HOSPADM

## 2024-12-03 RX ORDER — SUCCINYLCHOLINE/SOD CL,ISO/PF 100 MG/5ML
SYRINGE (ML) INTRAVENOUS
Status: DISCONTINUED | OUTPATIENT
Start: 2024-12-03 | End: 2024-12-03 | Stop reason: SDUPTHER

## 2024-12-03 RX ORDER — SODIUM CHLORIDE 0.9 % (FLUSH) 0.9 %
5-40 SYRINGE (ML) INJECTION PRN
Status: DISCONTINUED | OUTPATIENT
Start: 2024-12-03 | End: 2024-12-05 | Stop reason: HOSPADM

## 2024-12-03 RX ORDER — SODIUM CHLORIDE 0.9 % (FLUSH) 0.9 %
5-40 SYRINGE (ML) INJECTION EVERY 12 HOURS SCHEDULED
Status: DISCONTINUED | OUTPATIENT
Start: 2024-12-03 | End: 2024-12-05 | Stop reason: HOSPADM

## 2024-12-03 RX ORDER — MORPHINE SULFATE 15 MG/1
15 TABLET, FILM COATED, EXTENDED RELEASE ORAL 2 TIMES DAILY
Status: DISCONTINUED | OUTPATIENT
Start: 2024-12-03 | End: 2024-12-05 | Stop reason: HOSPADM

## 2024-12-03 RX ORDER — INSULIN LISPRO 100 [IU]/ML
0.08 INJECTION, SOLUTION INTRAVENOUS; SUBCUTANEOUS
Status: DISCONTINUED | OUTPATIENT
Start: 2024-12-03 | End: 2024-12-05 | Stop reason: HOSPADM

## 2024-12-03 RX ORDER — 0.9 % SODIUM CHLORIDE 0.9 %
INTRAVENOUS SOLUTION INTRAVENOUS
Status: DISCONTINUED | OUTPATIENT
Start: 2024-12-03 | End: 2024-12-03 | Stop reason: SDUPTHER

## 2024-12-03 RX ORDER — NALOXONE HYDROCHLORIDE 0.4 MG/ML
INJECTION, SOLUTION INTRAMUSCULAR; INTRAVENOUS; SUBCUTANEOUS PRN
Status: DISCONTINUED | OUTPATIENT
Start: 2024-12-03 | End: 2024-12-03 | Stop reason: HOSPADM

## 2024-12-03 RX ORDER — DEXAMETHASONE SODIUM PHOSPHATE 4 MG/ML
INJECTION, SOLUTION INTRA-ARTICULAR; INTRALESIONAL; INTRAMUSCULAR; INTRAVENOUS; SOFT TISSUE
Status: DISCONTINUED | OUTPATIENT
Start: 2024-12-03 | End: 2024-12-03 | Stop reason: SDUPTHER

## 2024-12-03 RX ORDER — INSULIN LISPRO 100 [IU]/ML
0-4 INJECTION, SOLUTION INTRAVENOUS; SUBCUTANEOUS
Status: DISCONTINUED | OUTPATIENT
Start: 2024-12-03 | End: 2024-12-05 | Stop reason: HOSPADM

## 2024-12-03 RX ORDER — ROCURONIUM BROMIDE 10 MG/ML
INJECTION, SOLUTION INTRAVENOUS
Status: DISCONTINUED | OUTPATIENT
Start: 2024-12-03 | End: 2024-12-03 | Stop reason: SDUPTHER

## 2024-12-03 RX ORDER — ROSUVASTATIN CALCIUM 10 MG/1
10 TABLET, COATED ORAL
Status: DISCONTINUED | OUTPATIENT
Start: 2024-12-03 | End: 2024-12-05 | Stop reason: HOSPADM

## 2024-12-03 RX ORDER — DIPHENHYDRAMINE HYDROCHLORIDE 50 MG/ML
12.5 INJECTION INTRAMUSCULAR; INTRAVENOUS
Status: DISCONTINUED | OUTPATIENT
Start: 2024-12-03 | End: 2024-12-03 | Stop reason: HOSPADM

## 2024-12-03 RX ORDER — DEXTROSE MONOHYDRATE 100 MG/ML
INJECTION, SOLUTION INTRAVENOUS CONTINUOUS PRN
Status: DISCONTINUED | OUTPATIENT
Start: 2024-12-03 | End: 2024-12-05 | Stop reason: HOSPADM

## 2024-12-03 RX ORDER — ENOXAPARIN SODIUM 100 MG/ML
30 INJECTION SUBCUTANEOUS 2 TIMES DAILY
Status: DISCONTINUED | OUTPATIENT
Start: 2024-12-03 | End: 2024-12-05 | Stop reason: HOSPADM

## 2024-12-03 RX ORDER — FENTANYL CITRATE 50 UG/ML
100 INJECTION, SOLUTION INTRAMUSCULAR; INTRAVENOUS
Status: DISCONTINUED | OUTPATIENT
Start: 2024-12-03 | End: 2024-12-03 | Stop reason: HOSPADM

## 2024-12-03 RX ORDER — HYDROMORPHONE HYDROCHLORIDE 2 MG/1
2 TABLET ORAL
Status: DISCONTINUED | OUTPATIENT
Start: 2024-12-03 | End: 2024-12-05 | Stop reason: HOSPADM

## 2024-12-03 RX ORDER — HEPARIN SODIUM 10000 [USP'U]/100ML
INJECTION, SOLUTION INTRAVENOUS CONTINUOUS PRN
Status: COMPLETED | OUTPATIENT
Start: 2024-12-03 | End: 2024-12-03

## 2024-12-03 RX ORDER — SODIUM CHLORIDE 9 MG/ML
INJECTION, SOLUTION INTRAVENOUS CONTINUOUS
Status: DISCONTINUED | OUTPATIENT
Start: 2024-12-03 | End: 2024-12-03 | Stop reason: HOSPADM

## 2024-12-03 RX ORDER — PROCHLORPERAZINE MALEATE 5 MG/1
10 TABLET ORAL EVERY 6 HOURS PRN
Status: DISCONTINUED | OUTPATIENT
Start: 2024-12-03 | End: 2024-12-05 | Stop reason: HOSPADM

## 2024-12-03 RX ORDER — AMLODIPINE BESYLATE 5 MG/1
5 TABLET ORAL EVERY MORNING
Status: DISCONTINUED | OUTPATIENT
Start: 2024-12-04 | End: 2024-12-05 | Stop reason: HOSPADM

## 2024-12-03 RX ORDER — SODIUM CHLORIDE 9 MG/ML
INJECTION, SOLUTION INTRAVENOUS PRN
Status: DISCONTINUED | OUTPATIENT
Start: 2024-12-03 | End: 2024-12-05 | Stop reason: HOSPADM

## 2024-12-03 RX ORDER — HYDROMORPHONE HYDROCHLORIDE 1 MG/ML
0.25 INJECTION, SOLUTION INTRAMUSCULAR; INTRAVENOUS; SUBCUTANEOUS
Status: DISCONTINUED | OUTPATIENT
Start: 2024-12-03 | End: 2024-12-04

## 2024-12-03 RX ORDER — LIDOCAINE HYDROCHLORIDE 20 MG/ML
INJECTION, SOLUTION EPIDURAL; INFILTRATION; INTRACAUDAL; PERINEURAL
Status: DISCONTINUED | OUTPATIENT
Start: 2024-12-03 | End: 2024-12-03 | Stop reason: SDUPTHER

## 2024-12-03 RX ORDER — ACETAMINOPHEN 500 MG
1000 TABLET ORAL EVERY 6 HOURS SCHEDULED
Status: DISCONTINUED | OUTPATIENT
Start: 2024-12-03 | End: 2024-12-05 | Stop reason: HOSPADM

## 2024-12-03 RX ORDER — LIDOCAINE HYDROCHLORIDE 10 MG/ML
1 INJECTION, SOLUTION EPIDURAL; INFILTRATION; INTRACAUDAL; PERINEURAL
Status: DISCONTINUED | OUTPATIENT
Start: 2024-12-03 | End: 2024-12-03 | Stop reason: HOSPADM

## 2024-12-03 RX ORDER — SODIUM CHLORIDE, SODIUM LACTATE, POTASSIUM CHLORIDE, CALCIUM CHLORIDE 600; 310; 30; 20 MG/100ML; MG/100ML; MG/100ML; MG/100ML
INJECTION, SOLUTION INTRAVENOUS
Status: DISCONTINUED | OUTPATIENT
Start: 2024-12-03 | End: 2024-12-03 | Stop reason: SDUPTHER

## 2024-12-03 RX ORDER — OLANZAPINE 5 MG/1
5 TABLET, ORALLY DISINTEGRATING ORAL NIGHTLY
Status: DISCONTINUED | OUTPATIENT
Start: 2024-12-03 | End: 2024-12-05 | Stop reason: HOSPADM

## 2024-12-03 RX ORDER — FENTANYL CITRATE 50 UG/ML
INJECTION, SOLUTION INTRAMUSCULAR; INTRAVENOUS
Status: DISCONTINUED | OUTPATIENT
Start: 2024-12-03 | End: 2024-12-03 | Stop reason: SDUPTHER

## 2024-12-03 RX ORDER — PROCHLORPERAZINE EDISYLATE 5 MG/ML
10 INJECTION INTRAMUSCULAR; INTRAVENOUS EVERY 6 HOURS PRN
Status: DISCONTINUED | OUTPATIENT
Start: 2024-12-03 | End: 2024-12-05 | Stop reason: HOSPADM

## 2024-12-03 RX ORDER — SODIUM CHLORIDE, SODIUM LACTATE, POTASSIUM CHLORIDE, CALCIUM CHLORIDE 600; 310; 30; 20 MG/100ML; MG/100ML; MG/100ML; MG/100ML
INJECTION, SOLUTION INTRAVENOUS CONTINUOUS
Status: DISCONTINUED | OUTPATIENT
Start: 2024-12-03 | End: 2024-12-03 | Stop reason: HOSPADM

## 2024-12-03 RX ORDER — METHYLPHENIDATE HYDROCHLORIDE 5 MG/1
10 TABLET ORAL 3 TIMES DAILY
Status: DISCONTINUED | OUTPATIENT
Start: 2024-12-04 | End: 2024-12-05 | Stop reason: HOSPADM

## 2024-12-03 RX ORDER — PROPOFOL 10 MG/ML
INJECTION, EMULSION INTRAVENOUS
Status: DISCONTINUED | OUTPATIENT
Start: 2024-12-03 | End: 2024-12-03 | Stop reason: SDUPTHER

## 2024-12-03 RX ADMIN — ONDANSETRON 4 MG: 2 INJECTION, SOLUTION INTRAMUSCULAR; INTRAVENOUS at 07:50

## 2024-12-03 RX ADMIN — ROCURONIUM BROMIDE 50 MG: 10 INJECTION, SOLUTION INTRAVENOUS at 08:39

## 2024-12-03 RX ADMIN — OLANZAPINE 5 MG: 5 TABLET, ORALLY DISINTEGRATING ORAL at 21:12

## 2024-12-03 RX ADMIN — Medication 20 MG: at 07:38

## 2024-12-03 RX ADMIN — PROPOFOL 150 MCG/KG/MIN: 10 INJECTION, EMULSION INTRAVENOUS at 07:52

## 2024-12-03 RX ADMIN — WATER 2000 MG: 1 INJECTION INTRAMUSCULAR; INTRAVENOUS; SUBCUTANEOUS at 12:00

## 2024-12-03 RX ADMIN — PROPOFOL 50 MG: 10 INJECTION, EMULSION INTRAVENOUS at 11:46

## 2024-12-03 RX ADMIN — ROCURONIUM BROMIDE 40 MG: 10 INJECTION, SOLUTION INTRAVENOUS at 07:55

## 2024-12-03 RX ADMIN — FENTANYL CITRATE 100 MCG: 50 INJECTION, SOLUTION INTRAMUSCULAR; INTRAVENOUS at 07:29

## 2024-12-03 RX ADMIN — VALACYCLOVIR HYDROCHLORIDE 500 MG: 500 TABLET, FILM COATED ORAL at 21:12

## 2024-12-03 RX ADMIN — ONDANSETRON 4 MG: 2 INJECTION, SOLUTION INTRAMUSCULAR; INTRAVENOUS at 13:59

## 2024-12-03 RX ADMIN — MORPHINE SULFATE 15 MG: 15 TABLET, FILM COATED, EXTENDED RELEASE ORAL at 21:12

## 2024-12-03 RX ADMIN — SENNOSIDES AND DOCUSATE SODIUM 1 TABLET: 50; 8.6 TABLET ORAL at 21:12

## 2024-12-03 RX ADMIN — ROCURONIUM BROMIDE 20 MG: 10 INJECTION, SOLUTION INTRAVENOUS at 12:10

## 2024-12-03 RX ADMIN — HYDROMORPHONE HYDROCHLORIDE 1 MG: 2 TABLET ORAL at 22:06

## 2024-12-03 RX ADMIN — ROCURONIUM BROMIDE 30 MG: 10 INJECTION, SOLUTION INTRAVENOUS at 11:02

## 2024-12-03 RX ADMIN — LIDOCAINE HYDROCHLORIDE 100 MG: 20 INJECTION, SOLUTION EPIDURAL; INFILTRATION; INTRACAUDAL; PERINEURAL at 07:38

## 2024-12-03 RX ADMIN — SUGAMMADEX 200 MG: 100 INJECTION, SOLUTION INTRAVENOUS at 15:39

## 2024-12-03 RX ADMIN — DEXAMETHASONE SODIUM PHOSPHATE 8 MG: 4 INJECTION INTRA-ARTICULAR; INTRALESIONAL; INTRAMUSCULAR; INTRAVENOUS; SOFT TISSUE at 07:50

## 2024-12-03 RX ADMIN — FENTANYL CITRATE 100 MCG: 50 INJECTION, SOLUTION INTRAMUSCULAR; INTRAVENOUS at 15:11

## 2024-12-03 RX ADMIN — INSULIN HUMAN 13 UNITS: 100 INJECTION, SUSPENSION SUBCUTANEOUS at 18:41

## 2024-12-03 RX ADMIN — ROCURONIUM BROMIDE 10 MG: 10 INJECTION, SOLUTION INTRAVENOUS at 07:38

## 2024-12-03 RX ADMIN — PROCHLORPERAZINE MALEATE 10 MG: 5 TABLET ORAL at 20:28

## 2024-12-03 RX ADMIN — SODIUM CHLORIDE 250 ML/HR: 9 INJECTION, SOLUTION INTRAVENOUS at 11:56

## 2024-12-03 RX ADMIN — PROPOFOL 150 MCG/KG/MIN: 10 INJECTION, EMULSION INTRAVENOUS at 11:40

## 2024-12-03 RX ADMIN — WATER 2000 MG: 1 INJECTION INTRAMUSCULAR; INTRAVENOUS; SUBCUTANEOUS at 07:45

## 2024-12-03 RX ADMIN — PROPOFOL 50 MG: 10 INJECTION, EMULSION INTRAVENOUS at 11:47

## 2024-12-03 RX ADMIN — MIDAZOLAM HYDROCHLORIDE 2 MG: 1 INJECTION, SOLUTION INTRAMUSCULAR; INTRAVENOUS at 11:46

## 2024-12-03 RX ADMIN — ROSUVASTATIN CALCIUM 10 MG: 10 TABLET, FILM COATED ORAL at 21:12

## 2024-12-03 RX ADMIN — ENOXAPARIN SODIUM 30 MG: 100 INJECTION SUBCUTANEOUS at 21:12

## 2024-12-03 RX ADMIN — Medication 20 MG: at 10:45

## 2024-12-03 RX ADMIN — FAMOTIDINE 20 MG: 10 INJECTION, SOLUTION INTRAVENOUS at 07:50

## 2024-12-03 RX ADMIN — PROPOFOL 200 MG: 10 INJECTION, EMULSION INTRAVENOUS at 07:38

## 2024-12-03 RX ADMIN — SODIUM CHLORIDE, PRESERVATIVE FREE 10 ML: 5 INJECTION INTRAVENOUS at 21:18

## 2024-12-03 RX ADMIN — MIDAZOLAM HYDROCHLORIDE 2 MG: 1 INJECTION, SOLUTION INTRAMUSCULAR; INTRAVENOUS at 07:29

## 2024-12-03 RX ADMIN — PROPOFOL 150 MCG/KG/MIN: 10 INJECTION, EMULSION INTRAVENOUS at 11:50

## 2024-12-03 RX ADMIN — ROCURONIUM BROMIDE 50 MG: 10 INJECTION, SOLUTION INTRAVENOUS at 09:59

## 2024-12-03 RX ADMIN — WATER 2000 MG: 1 INJECTION INTRAMUSCULAR; INTRAVENOUS; SUBCUTANEOUS at 18:40

## 2024-12-03 RX ADMIN — Medication 10 MG: at 11:24

## 2024-12-03 RX ADMIN — ACETAMINOPHEN 1000 MG: 500 TABLET ORAL at 18:40

## 2024-12-03 RX ADMIN — HEPARIN SODIUM 5000 UNITS: 10000 INJECTION INTRAVENOUS; SUBCUTANEOUS at 12:04

## 2024-12-03 RX ADMIN — PROPOFOL 50 MG: 10 INJECTION, EMULSION INTRAVENOUS at 11:45

## 2024-12-03 RX ADMIN — SODIUM CHLORIDE, POTASSIUM CHLORIDE, SODIUM LACTATE AND CALCIUM CHLORIDE: 600; 310; 30; 20 INJECTION, SOLUTION INTRAVENOUS at 07:30

## 2024-12-03 RX ADMIN — Medication 100 MG: at 07:38

## 2024-12-03 RX ADMIN — FENTANYL CITRATE 100 MCG: 50 INJECTION, SOLUTION INTRAMUSCULAR; INTRAVENOUS at 11:56

## 2024-12-03 ASSESSMENT — PAIN SCALES - GENERAL
PAINLEVEL_OUTOF10: 0
PAINLEVEL_OUTOF10: 5

## 2024-12-03 ASSESSMENT — PAIN - FUNCTIONAL ASSESSMENT: PAIN_FUNCTIONAL_ASSESSMENT: 0-10

## 2024-12-03 ASSESSMENT — PAIN DESCRIPTION - ORIENTATION: ORIENTATION: ANTERIOR;LEFT;RIGHT;MID

## 2024-12-03 ASSESSMENT — PAIN DESCRIPTION - LOCATION: LOCATION: HEAD

## 2024-12-03 ASSESSMENT — PAIN DESCRIPTION - DESCRIPTORS: DESCRIPTORS: DULL;THROBBING

## 2024-12-03 NOTE — ANESTHESIA POSTPROCEDURE EVALUATION
Department of Anesthesiology  Postprocedure Note    Patient: Nyasia Rodriguez  MRN: [id number]  YOB: 1960  Date of evaluation: 12/3/2024    Procedure Summary       Date: 12/03/24 Room / Location: [facility] MAIN OR  / [facility] MAIN OR    Anesthesia Start: 0730 Anesthesia Stop: 1606    Procedure: LEFT BREAST IMPLANT REMOVAL, BILATERAL CAPSULECTOMY, BILATERAL BREAST RECONSTRUCTION WITH DEEP INFERIOR EPIGASTRIC ARTERY  FLAP (JAZMIN) (Bilateral: Abdomen) Diagnosis:       HX: breast cancer      Acquired absence of bilateral breasts and nipples      (HX: breast cancer [Z85.3])      (Acquired absence of bilateral breasts and nipples [Z90.13])    Surgeons: Fabian White MD Responsible Provider: Rui Weeks MD    Anesthesia Type: general ASA Status: 3            Anesthesia Type: No value filed.    Osmel Phase I:      Osmel Phase II:      Anesthesia Post Evaluation    Patient location during evaluation: PACU  Patient participation: complete - patient participated  Level of consciousness: awake and alert  Pain score: 1  Airway patency: patent  Nausea & Vomiting: no nausea and no vomiting  Cardiovascular status: blood pressure returned to baseline  Respiratory status: acceptable  Pain management: satisfactory to patient    No notable events documented.

## 2024-12-03 NOTE — PERIOP NOTE
TRANSFER - OUT REPORT:    Verbal report given to CAROLINE Syed  on Nyasia Rodriguez  being transferred to [facility] for routine post-op       Report consisted of patient's Situation, Background, Assessment and   Recommendations(SBAR).     Information from the following report(s) Nurse Handoff Report, Intake/Output, MAR, and Cardiac Rhythm Sinus Tach  was reviewed with the receiving nurse.           Lines:   Peripheral IV 12/03/24 Left;Anterior Hand (Active)   Site Assessment Clean, dry & intact 12/03/24 1635   Line Status Infusing 12/03/24 1635   Line Care Connections checked and tightened 12/03/24 1635   Phlebitis Assessment No symptoms 12/03/24 1635   Infiltration Assessment 0 12/03/24 1635   Alcohol Cap Used Yes 12/03/24 1635   Dressing Status Clean, dry & intact 12/03/24 1635   Dressing Type Transparent 12/03/24 1635   Dressing Intervention New 12/03/24 0707        Opportunity for questions and clarification was provided.      Patient transported with:  Monitor, O2 @ 3lpm, and Registered Nurse

## 2024-12-03 NOTE — ANESTHESIA PRE PROCEDURE
Department of Anesthesiology  Preprocedure Note       Name:  Nyasia Rodriguez   Age:  64 y.o.  :  1960                                          MRN:  [id number]         Date:  12/3/2024      Surgeon: Surgeon(s):  Fabian White MD Hubert, Darrin M, MD    Procedure: Procedure(s):  LEFT BREAST IMPLANT REMOVAL, BILATERAL CAPSULECTOMY, BILATERAL BREAST RECONSTRUCTION WITH DEEP INFERIOR EPIGASTRIC ARTERY  FLAP (JAZMIN)    Medications prior to admission:   Prior to Admission medications    Medication Sig Start Date End Date Taking? Authorizing Provider   OLANZapine zydis (ZYPREXA) 5 MG disintegrating tablet Take 1 tablet by mouth nightly    Vanessa Cortes MD   Denosumab (XGEVA SC) Inject into the skin every 30 days    Vanessa Cortes MD   Simethicone (GAS-X PO) Take by mouth 2 times daily as needed    Vanessa Cortes MD   vitamin D (VITAMIN D, CHOLECALCIFEROL,) 25 MCG (1000 UT) CAPS Take by mouth in the morning and at bedtime    Vanessa Cortes MD   CALCIUM PO Take 650 mg by mouth in the morning and at bedtime    Vanessa Cortes MD   TURMERIC PO Take 1.5 g by mouth every evening    Vanessa Cortes MD   meloxicam (MOBIC) 15 MG tablet Take 1 tablet by mouth every evening    Vanessa Cortes MD   Acetaminophen (TYLENOL PO) Take 1,300 mg by mouth in the morning and at bedtime    Vanessa Cortes MD   MAGNESIUM GLYCINATE PO Take 3 capsules by mouth every evening    Vanessa Cortes MD   QUEtiapine (SEROQUEL) 100 MG tablet Take 1 tablet by mouth 2 times daily 24   Vanessa Cortes MD   valACYclovir (VALTREX) 500 MG tablet Take 1 tablet by mouth nightly 24   Vanessa Cortes MD   TRULICITY 4.5 MG/0.5ML SOPN Inject 4.5 mg as directed Once a week at 5 PM  Patient taking differently: Inject 4.5 mg as directed Once a week at 5 PM 24   Rosa Velasco MD Lancets MISC Test fasting glucose daily; Dx E11.8; Pharmacist to choose based

## 2024-12-03 NOTE — FLOWSHEET NOTE
Steri strips on incision   12/03/24 1545   Flap (Recipient) 12/03/24 Breast Lower;Right   Date First Assessed/Time First Assessed: 12/03/24 1452   Present on Original Admission: No  Primary Wound Type: Surgical Type  Location: Breast  Wound Location Orientation: Lower;Right   Recipient Site Tissue Oxygenation (StO2) 68 %

## 2024-12-03 NOTE — H&P
Monitoring Suppl w/Device KIT Test fasting glucose daily once fasting in the morning; Dx E11.8; Pharmacist to choose based on insurance/glucose monitoring kit type. Patient has poor blood sugar control and needs monitoring at home. 5/14/24   Rosa Velasco MD   methylphenidate (RITALIN) 10 MG tablet Take 1 tablet by mouth 3 times daily.    Vanessa Cortes MD   methylphenidate (RITALIN LA) 40 MG extended release capsule Take 1 capsule by mouth every morning.    Vanessa Cortes MD   HYDROcodone-acetaminophen (NORCO)  MG per tablet Take 1 tablet by mouth in the morning and at bedtime.    Vanessa Cortes MD   latanoprost (XALATAN) 0.005 % ophthalmic solution Place 1 drop into the right eye nightly    Vanessa Cortes MD   Galcanezumab-gnlm (EMGALITY) 120 MG/ML SOAJ Inject into the skin every 28 days Next dose 12/1/24    Vanessa Cortes MD   prochlorperazine (COMPAZINE) 10 MG tablet Take 1 tablet by mouth every 6 hours as needed    Vanessa Cortes MD   ondansetron (ZOFRAN) 4 MG tablet Take 1 tablet by mouth every 8 hours as needed for Nausea or Vomiting  Patient not taking: Reported on 11/19/2024    Vanessa Cortes MD   docusate sodium (COLACE) 100 MG capsule Take 1 capsule by mouth 2 times daily  Patient not taking: Reported on 11/19/2024    Vanessa Cortes MD   loperamide (IMODIUM) 2 MG capsule Take 1 capsule by mouth 4 times daily as needed for Diarrhea    Vanessa Cortes MD   naloxone 4 MG/0.1ML LIQD nasal spray 1 spray by Nasal route as needed for Opioid Reversal    Vanessa Cortes MD   naratriptan (AMERGE) 2.5 MG tablet Take 1 tablet by mouth once as needed 9/13/16   Vanessa Cortes MD   Artificial Tear Solution (ARTIFICIAL TEARS, DEXTRAN-HYPROMELLOSE-GLYCERIN,) SOLN ophthalmic solution Place 1 drop into both eyes every 4 hours as needed  Patient not taking: Reported on 11/19/2024    Vanessa oCrtes MD   naproxen (NAPROSYN) 500 MG tablet

## 2024-12-03 NOTE — OP NOTE
Name: Nyasia Rodriguez  Surgeon: Fabian White MD   Account #: [id number]   Surgery Date: 24   : 1960  Age: 64 y.o.   Location: [facility]    OPERATIVE REPORT     PREOPERATIVE DIAGNOSES:   1.  Right breast cancer.   2.  Acquired absence of bilateral breasts.  3.  History of radiation therapy to right breast.     POSTOPERATIVE DIAGNOSES:   1.  Right breast cancer.   2.  Acquired absence of bilateral breasts.  3.  History of radiation therapy to right breast.     OPERATIVE PROCEDURE:   1.  Excision of bilateral mastectomy scars, 15 cm each (Dr. White on the right, Dr. Paz on the left).   2.  Removal of intact left breast implant (Dr. Paz)  3.  Bilateral breast capsulectomies (Dr. White on the right, Dr. Paz on the left).  4.  Bilateral extrapleural rib resection (Dr. White on the right, Dr. Paz on the left).   5.  Delayed bilateral breast reconstruction with JAZMIN (deep inferior epigastric artery ) flaps (both as Co-Surgeons).  6.  Intraoperative bilateral TAP (transversus abdominis plane) regional block (injection) with ultrasound guidance (Dr. White on the right, Dr. Paz on the left).    SURGEON:  Fabian White MD    CO-SURGEON:  Ben Paz MD     ANESTHESIA: General endotracheal.     INDICATIONS: The patient is a 64 y.o. female who was previously diagnosed with breast cancer. She had bilateral mastectomies and tissue expander reconstruction. She had radiation therapy on the right side. Due to concerns of new onset neurological symptoms, she was advised to undergo MRI, necessitating a tissue expander to implant exchange four weeks after completion of radiation therapy. Her right breast incision did not heal and required explantation. She developed a painful seroma of the right breast. Bilateral microsurgical  flap reconstruction was recommended. The procedure, as well as the alternatives, possible complications, and anticipated scars were outlined 
bathed in papaverine and covered with saline-moistened gauze until ready for use.  The patient received 5000 units subcutaneous heparin.    Next, the right hemiabdominal JAZMIN flap was harvested by ligating the origin of the vessels with hemostatic clips.  It was brought to the right chest and placed in the pocket.  The flap was positioned and the operating microscope brought in.  The flap vessels were prepared under the microscope and irrigated with heparin saline solution.  The venous anastomosis was performed first.  The internal mammary vein was clipped inferiorly and clamped superiorly.  It was divided, prepared, and irrigated.  Next an end-to-end anastomosis was performed with a 3.0 mm venous  in antegrade fashion.  The clamp was taken down and excellent backfill noted.  Next the arterial anastomosis was performed.  The internal mammary artery was clipped inferiorly and clamped superiorly.  It was divided, prepared, and irrigated.  Then the anastomosis was performed with interrupted #9-0 nylon sutures in hand sewn fashion.  The clamps were taken down and excellent perfusion of the flap noted.  Exparel had been expanded with 40 mL injectable saline and 40 mL 0.25% plain marcaine to 100 mL total.  Twenty mL was infiltrated laterally in the breast pocket along the intercostal nerves.  A 19 Fr hubless Travis drain was placed in the breast pocket, brought out through a #15 blade stab incision in the lateral inframammary fold and secured with a #2-0 nylon suture.  Much of the fibrotic, thin, previously irradiated breast skin was excised and sent for permanent section.  The skin island was marked as a large oval.  The remainder was de-epithelialized with scissors.  The flap was stapled in the breast pocket.    Next the left hemiabdominal JAZMIN flap was harvested by ligating the origin of the vessels with hemostatic clips.  It was brought to the left chest and placed in the pocket.  A strip of lower pole breast

## 2024-12-03 NOTE — FLOWSHEET NOTE
12/03/24 1542   Flap (Recipient) 12/03/24 Breast Left;Lower   Date First Assessed/Time First Assessed: 12/03/24 1453   Primary Wound Type: Surgical Type  Location: Breast  Wound Location Orientation: Left;Lower   Recipient Site Tissue Oxygenation (StO2) 64 %

## 2024-12-04 LAB
EST. AVERAGE GLUCOSE BLD GHB EST-MCNC: 123 MG/DL
GLUCOSE BLD STRIP.AUTO-MCNC: 122 MG/DL (ref 65–117)
GLUCOSE BLD STRIP.AUTO-MCNC: 125 MG/DL (ref 65–117)
GLUCOSE BLD STRIP.AUTO-MCNC: 137 MG/DL (ref 65–117)
GLUCOSE BLD STRIP.AUTO-MCNC: 219 MG/DL (ref 65–117)
HBA1C MFR BLD: 5.9 % (ref 4–5.6)
SERVICE CMNT-IMP: ABNORMAL

## 2024-12-04 PROCEDURE — 2580000003 HC RX 258: Performed by: SURGERY

## 2024-12-04 PROCEDURE — 83036 HEMOGLOBIN GLYCOSYLATED A1C: CPT

## 2024-12-04 PROCEDURE — 36415 COLL VENOUS BLD VENIPUNCTURE: CPT

## 2024-12-04 PROCEDURE — 6360000002 HC RX W HCPCS: Performed by: SURGERY

## 2024-12-04 PROCEDURE — 2000000000 HC ICU R&B

## 2024-12-04 PROCEDURE — 94761 N-INVAS EAR/PLS OXIMETRY MLT: CPT

## 2024-12-04 PROCEDURE — 82962 GLUCOSE BLOOD TEST: CPT

## 2024-12-04 PROCEDURE — 6370000000 HC RX 637 (ALT 250 FOR IP): Performed by: SURGERY

## 2024-12-04 RX ORDER — HYDROMORPHONE HYDROCHLORIDE 2 MG/1
2 TABLET ORAL EVERY 6 HOURS PRN
Qty: 20 TABLET | Refills: 0 | Status: SHIPPED | OUTPATIENT
Start: 2024-12-04 | End: 2024-12-09

## 2024-12-04 RX ORDER — DOXYCYCLINE HYCLATE 100 MG
100 TABLET ORAL 2 TIMES DAILY
Qty: 14 TABLET | Refills: 0 | Status: SHIPPED | OUTPATIENT
Start: 2024-12-04 | End: 2024-12-11

## 2024-12-04 RX ORDER — ONDANSETRON 4 MG/1
4 TABLET, ORALLY DISINTEGRATING ORAL 3 TIMES DAILY PRN
Qty: 21 TABLET | Refills: 0 | Status: SHIPPED | OUTPATIENT
Start: 2024-12-04

## 2024-12-04 RX ORDER — ASPIRIN 325 MG
325 TABLET ORAL DAILY
Qty: 30 TABLET | Refills: 3 | Status: SHIPPED | OUTPATIENT
Start: 2024-12-04

## 2024-12-04 RX ORDER — SIMETHICONE 80 MG
80 TABLET,CHEWABLE ORAL EVERY 6 HOURS PRN
Status: DISCONTINUED | OUTPATIENT
Start: 2024-12-04 | End: 2024-12-05 | Stop reason: HOSPADM

## 2024-12-04 RX ORDER — AMOXICILLIN 250 MG
1 CAPSULE ORAL DAILY
Qty: 60 TABLET | Refills: 0 | Status: SHIPPED | OUTPATIENT
Start: 2024-12-04

## 2024-12-04 RX ADMIN — INSULIN LISPRO 1 UNITS: 100 INJECTION, SOLUTION INTRAVENOUS; SUBCUTANEOUS at 08:24

## 2024-12-04 RX ADMIN — ENOXAPARIN SODIUM 30 MG: 100 INJECTION SUBCUTANEOUS at 10:06

## 2024-12-04 RX ADMIN — ACETAMINOPHEN 1000 MG: 500 TABLET ORAL at 06:03

## 2024-12-04 RX ADMIN — ACETAMINOPHEN 1000 MG: 500 TABLET ORAL at 11:37

## 2024-12-04 RX ADMIN — INSULIN LISPRO 8 UNITS: 100 INJECTION, SOLUTION INTRAVENOUS; SUBCUTANEOUS at 17:08

## 2024-12-04 RX ADMIN — INSULIN HUMAN 13 UNITS: 100 INJECTION, SUSPENSION SUBCUTANEOUS at 08:25

## 2024-12-04 RX ADMIN — VENLAFAXINE HYDROCHLORIDE 225 MG: 37.5 CAPSULE, EXTENDED RELEASE ORAL at 08:21

## 2024-12-04 RX ADMIN — WATER 2000 MG: 1 INJECTION INTRAMUSCULAR; INTRAVENOUS; SUBCUTANEOUS at 10:06

## 2024-12-04 RX ADMIN — INSULIN LISPRO 8 UNITS: 100 INJECTION, SOLUTION INTRAVENOUS; SUBCUTANEOUS at 11:37

## 2024-12-04 RX ADMIN — AMLODIPINE BESYLATE 5 MG: 5 TABLET ORAL at 08:28

## 2024-12-04 RX ADMIN — MORPHINE SULFATE 15 MG: 15 TABLET, FILM COATED, EXTENDED RELEASE ORAL at 08:19

## 2024-12-04 RX ADMIN — VALACYCLOVIR HYDROCHLORIDE 500 MG: 500 TABLET, FILM COATED ORAL at 20:50

## 2024-12-04 RX ADMIN — SENNOSIDES AND DOCUSATE SODIUM 1 TABLET: 50; 8.6 TABLET ORAL at 20:50

## 2024-12-04 RX ADMIN — OLANZAPINE 5 MG: 5 TABLET, ORALLY DISINTEGRATING ORAL at 20:50

## 2024-12-04 RX ADMIN — SENNOSIDES AND DOCUSATE SODIUM 1 TABLET: 50; 8.6 TABLET ORAL at 08:19

## 2024-12-04 RX ADMIN — HYDROMORPHONE HYDROCHLORIDE 1 MG: 2 TABLET ORAL at 18:14

## 2024-12-04 RX ADMIN — ACETAMINOPHEN 1000 MG: 500 TABLET ORAL at 00:20

## 2024-12-04 RX ADMIN — INSULIN LISPRO 8 UNITS: 100 INJECTION, SOLUTION INTRAVENOUS; SUBCUTANEOUS at 08:26

## 2024-12-04 RX ADMIN — ROSUVASTATIN CALCIUM 10 MG: 10 TABLET, FILM COATED ORAL at 20:50

## 2024-12-04 RX ADMIN — SODIUM CHLORIDE, PRESERVATIVE FREE 10 ML: 5 INJECTION INTRAVENOUS at 08:27

## 2024-12-04 RX ADMIN — INSULIN HUMAN 13 UNITS: 100 INJECTION, SUSPENSION SUBCUTANEOUS at 17:07

## 2024-12-04 RX ADMIN — MORPHINE SULFATE 15 MG: 15 TABLET, FILM COATED, EXTENDED RELEASE ORAL at 20:50

## 2024-12-04 RX ADMIN — WATER 2000 MG: 1 INJECTION INTRAMUSCULAR; INTRAVENOUS; SUBCUTANEOUS at 17:52

## 2024-12-04 RX ADMIN — ENOXAPARIN SODIUM 30 MG: 100 INJECTION SUBCUTANEOUS at 20:51

## 2024-12-04 RX ADMIN — SIMETHICONE 80 MG: 80 TABLET, CHEWABLE ORAL at 17:51

## 2024-12-04 RX ADMIN — SODIUM CHLORIDE, PRESERVATIVE FREE 10 ML: 5 INJECTION INTRAVENOUS at 20:51

## 2024-12-04 RX ADMIN — ACETAMINOPHEN 1000 MG: 500 TABLET ORAL at 18:13

## 2024-12-04 RX ADMIN — WATER 2000 MG: 1 INJECTION INTRAMUSCULAR; INTRAVENOUS; SUBCUTANEOUS at 02:14

## 2024-12-04 ASSESSMENT — PAIN DESCRIPTION - DESCRIPTORS
DESCRIPTORS: DULL

## 2024-12-04 ASSESSMENT — PAIN SCALES - GENERAL
PAINLEVEL_OUTOF10: 3
PAINLEVEL_OUTOF10: 0
PAINLEVEL_OUTOF10: 3
PAINLEVEL_OUTOF10: 0
PAINLEVEL_OUTOF10: 0
PAINLEVEL_OUTOF10: 4
PAINLEVEL_OUTOF10: 4
PAINLEVEL_OUTOF10: 0
PAINLEVEL_OUTOF10: 3
PAINLEVEL_OUTOF10: 0
PAINLEVEL_OUTOF10: 3

## 2024-12-04 ASSESSMENT — PAIN DESCRIPTION - ORIENTATION
ORIENTATION: LEFT
ORIENTATION: LEFT;RIGHT
ORIENTATION: LEFT
ORIENTATION: RIGHT;LEFT
ORIENTATION: LEFT
ORIENTATION: LEFT;RIGHT

## 2024-12-04 ASSESSMENT — PAIN DESCRIPTION - PAIN TYPE
TYPE: ACUTE PAIN
TYPE: ACUTE PAIN

## 2024-12-04 ASSESSMENT — PAIN DESCRIPTION - LOCATION
LOCATION: BREAST
LOCATION: BREAST;ABDOMEN
LOCATION: ABDOMEN;BREAST
LOCATION: BREAST
LOCATION: ABDOMEN;BREAST
LOCATION: BREAST

## 2024-12-04 ASSESSMENT — PAIN DESCRIPTION - FREQUENCY: FREQUENCY: INTERMITTENT

## 2024-12-04 NOTE — PROGRESS NOTES
Plastic Surgery Progress Note    12/3/2024 : L implant removal, b/l JAZMIN flap reconstruction    Subjective: doing well this AM. Taking tylenol    Vitals:    12/04/24 0645 12/04/24 0700 12/04/24 0715 12/04/24 0730   BP: 122/80 118/79 121/74 130/85   Pulse: (!) 105 (!) 103 (!) 103 100   Resp: 15 14 14 11   Temp:    97.5 °F (36.4 °C)   TempSrc:    Oral   SpO2: 93% 93% 94% 94%   Weight:            Date 12/04/24 0000 - 12/04/24 2359   Shift 8403-1801 7603-3754 2746-7289 24 Hour Total   INTAKE   Shift Total(mL/kg)       OUTPUT   Urine(mL/kg/hr) 730   730   Drains 121   121   Shift Total(mL/kg) 851(8.2)   851(8.2)   Weight (kg) 104.1 104.1 104.1 104.1        Gen: NAD, comfortable   CV: reg rate and rhythm  Resp: normal resp effort on  RA   Breast: b/l breast flap warm, with good doppler signal. R mastectomy skin edematous. AMELIA drains s/s  Abdomen: incision c/d/I, umbo viable. AMELIA drains s/s    Assessment: POD 1 s/p b/l JAZMIN flap surgery    Plan:   - q2hr flap monitoring: monitor each flap every 2 hour(s) for color, temperature, cap refill, doppler signal.  vioptix reading, notify MD if decreases by 20%  - drain cares  - activity:  out of bed in AM, ambulate in PM   - newman:  remove newman catheter  - diet: advance to general in PM  general diet  - pain control:  scheduled tylenol, oral dilaudid as needed   - DVT ppx: lovenox, SCDs  - Dispo: anticipate d/c tomorrow

## 2024-12-04 NOTE — DISCHARGE INSTRUCTIONS
the skin  Dislodgment of the AMELIA drain or if the drain falls out  Spreading redness around the drain site in the skin  Cloudy or foul-smelling drainage in the bulb or around the drain site  Fever, shaking chills, excessive swelling, pain or discomfort  Any other concerns or questions         Date           Right Breast (AM)           Right Breast (PM)           Daily Total   (AM+PM)           Left Breast (AM)           Left Breast (PM)           Daily  Total  (AM+PM)           Right Abdomen (AM)           Right Abdomen (PM)           Daily  Total  (AM+PM)           Left Abdomen (AM)           Left Abdomen (PM)           Daily Total   (AM+PM)

## 2024-12-04 NOTE — PROGRESS NOTES
0700- Bedside and Verbal shift change report given to Kaleigh TORRES RN/Namita GARY RN (oncoming nurse) by Bri HAYWOOD RN (offgoing nurse). Report included the following information Intake/Output, MAR, Recent Results, Cardiac Rhythm Sinus Tach, Alarm Parameters, and Quality Measures.  JAZMIN FLAP skin assessment and doppler assessment completed with offgoing RN Bri. No changes noted. Patient still has some numbness/tingling to B/L fingers but has improved from evening. MD has been made aware by night shift RN.  0800- JAZMIN FLAP assessment complete.  0820- Dr. White in to see patient- plan to start regular diet, remove newman and ambulate to recliner today and in evening ambulate as tolerated. Plan discussed with patient and in agreeance.  0900- Tolerated regular diet well.  1000- Patient resting at this time, does not want to get UOB/remove newman at this time. Plan to complete on next JAZMIN FLAP check @ 1200. JAZMIN FLAP unchanged, doppler completed.  1100- Remains resting at this time. No changes noted.  1135- Set up with lunch and insulin provided as ordered. Pt refused Ritalin as it would mess with her sleep cycle at this time.  1200- JAZMIN FLAP unchanged. Patient ate lunch. Wants to take another nap. Education provided that next check we will then remove newman and get UOB.  1300- no changes noted at this time.  1400- JAZMIN FLAP unchanged. Pt resting comfortably, reminded that necessary to get newman out and UOB.  1445- Newman removed. Patient did well getting UOB, denies pain.  1500- Remains UOB in recliner with call bell in reach. Vioptx continues.  1600- JAZMIN FLAP unchanged. UOB in recliner.  1700- Eating dinner, remains UOB in recliner.  1745- Spoke with Dr. White regarding patient having gas- order received for Simethicone tablet.  1800- JAZMIN FLAP unchanged.  1813- Provided with PRN pain medication.  1900-

## 2024-12-05 VITALS
WEIGHT: 229.5 LBS | TEMPERATURE: 97.5 F | RESPIRATION RATE: 20 BRPM | HEART RATE: 109 BPM | DIASTOLIC BLOOD PRESSURE: 71 MMHG | OXYGEN SATURATION: 95 % | BODY MASS INDEX: 37.04 KG/M2 | SYSTOLIC BLOOD PRESSURE: 113 MMHG

## 2024-12-05 LAB
GLUCOSE BLD STRIP.AUTO-MCNC: 107 MG/DL (ref 65–117)
GLUCOSE BLD STRIP.AUTO-MCNC: 119 MG/DL (ref 65–117)
SERVICE CMNT-IMP: ABNORMAL
SERVICE CMNT-IMP: NORMAL

## 2024-12-05 PROCEDURE — 82962 GLUCOSE BLOOD TEST: CPT

## 2024-12-05 PROCEDURE — 94761 N-INVAS EAR/PLS OXIMETRY MLT: CPT

## 2024-12-05 PROCEDURE — 2580000003 HC RX 258: Performed by: SURGERY

## 2024-12-05 PROCEDURE — 6370000000 HC RX 637 (ALT 250 FOR IP): Performed by: SURGERY

## 2024-12-05 PROCEDURE — 6360000002 HC RX W HCPCS: Performed by: SURGERY

## 2024-12-05 RX ORDER — HYDROMORPHONE HYDROCHLORIDE 2 MG/1
2 TABLET ORAL EVERY 6 HOURS PRN
Qty: 20 TABLET | Refills: 0 | Status: SHIPPED | OUTPATIENT
Start: 2024-12-05 | End: 2024-12-10

## 2024-12-05 RX ORDER — SULFAMETHOXAZOLE AND TRIMETHOPRIM 800; 160 MG/1; MG/1
1 TABLET ORAL 2 TIMES DAILY
Qty: 14 TABLET | Refills: 0 | Status: SHIPPED | OUTPATIENT
Start: 2024-12-05 | End: 2024-12-12

## 2024-12-05 RX ADMIN — ENOXAPARIN SODIUM 30 MG: 100 INJECTION SUBCUTANEOUS at 08:30

## 2024-12-05 RX ADMIN — WATER 2000 MG: 1 INJECTION INTRAMUSCULAR; INTRAVENOUS; SUBCUTANEOUS at 02:06

## 2024-12-05 RX ADMIN — METHYLPHENIDATE HYDROCHLORIDE 10 MG: 5 TABLET ORAL at 11:56

## 2024-12-05 RX ADMIN — VENLAFAXINE HYDROCHLORIDE 225 MG: 37.5 CAPSULE, EXTENDED RELEASE ORAL at 08:26

## 2024-12-05 RX ADMIN — SODIUM CHLORIDE, PRESERVATIVE FREE 10 ML: 5 INJECTION INTRAVENOUS at 08:25

## 2024-12-05 RX ADMIN — ACETAMINOPHEN 1000 MG: 500 TABLET ORAL at 00:07

## 2024-12-05 RX ADMIN — HYDROMORPHONE HYDROCHLORIDE 2 MG: 2 TABLET ORAL at 02:12

## 2024-12-05 RX ADMIN — WATER 2000 MG: 1 INJECTION INTRAMUSCULAR; INTRAVENOUS; SUBCUTANEOUS at 10:30

## 2024-12-05 RX ADMIN — SENNOSIDES AND DOCUSATE SODIUM 1 TABLET: 50; 8.6 TABLET ORAL at 08:26

## 2024-12-05 RX ADMIN — ACETAMINOPHEN 1000 MG: 500 TABLET ORAL at 06:10

## 2024-12-05 RX ADMIN — ACETAMINOPHEN 1000 MG: 500 TABLET ORAL at 11:55

## 2024-12-05 RX ADMIN — INSULIN HUMAN 13 UNITS: 100 INJECTION, SUSPENSION SUBCUTANEOUS at 08:22

## 2024-12-05 RX ADMIN — MORPHINE SULFATE 15 MG: 15 TABLET, FILM COATED, EXTENDED RELEASE ORAL at 08:26

## 2024-12-05 RX ADMIN — INSULIN LISPRO 8 UNITS: 100 INJECTION, SOLUTION INTRAVENOUS; SUBCUTANEOUS at 08:21

## 2024-12-05 RX ADMIN — INSULIN LISPRO 8 UNITS: 100 INJECTION, SOLUTION INTRAVENOUS; SUBCUTANEOUS at 11:56

## 2024-12-05 RX ADMIN — AMLODIPINE BESYLATE 5 MG: 5 TABLET ORAL at 08:26

## 2024-12-05 ASSESSMENT — PAIN SCALES - GENERAL
PAINLEVEL_OUTOF10: 4
PAINLEVEL_OUTOF10: 4
PAINLEVEL_OUTOF10: 0
PAINLEVEL_OUTOF10: 7
PAINLEVEL_OUTOF10: 0
PAINLEVEL_OUTOF10: 4
PAINLEVEL_OUTOF10: 0

## 2024-12-05 ASSESSMENT — PAIN DESCRIPTION - DESCRIPTORS
DESCRIPTORS: DISCOMFORT;SORE;SHARP;TENDER
DESCRIPTORS: DULL

## 2024-12-05 ASSESSMENT — PAIN DESCRIPTION - FREQUENCY: FREQUENCY: INTERMITTENT

## 2024-12-05 ASSESSMENT — PAIN DESCRIPTION - LOCATION
LOCATION: BREAST
LOCATION: ABDOMEN;BREAST
LOCATION: BREAST
LOCATION: BREAST;ABDOMEN

## 2024-12-05 ASSESSMENT — PAIN DESCRIPTION - ORIENTATION
ORIENTATION: RIGHT
ORIENTATION: RIGHT
ORIENTATION: LEFT
ORIENTATION: LEFT;LOWER;MID

## 2024-12-05 ASSESSMENT — PAIN DESCRIPTION - PAIN TYPE
TYPE: ACUTE PAIN
TYPE: ACUTE PAIN

## 2024-12-05 NOTE — PROGRESS NOTES
0700- Bedside and Verbal shift change report given to Unique/Namita RN  (oncoming nurse) by CAROLINE Gregory  (offgoing nurse). Report included the following information Nurse Handoff Report, Index, Adult Overview, Intake/Output, MAR, Cardiac Rhythm Sinus Rhythm , and Quality Measures.     0710- Dr. Paz present at bedside assessing patient, plans to discharge patient home today. Vioptix removed, q 4hr flap assessments by MD.     0800- Assessment completed, see flow sheet.     1200- Reassessment completed, see flow sheet.     1211- No further needs per Alejandra MIMS CM, pt OK to continue with discharge.     1300- Pt in own clothing, PIV removed, disconnected from monitoring. Discharge instructions provided to patient. All questions answered to satisfaction. No further needs at this time. Pt to discharge home with friend, prescriptions sent to pt preferred pharmacy.

## 2024-12-05 NOTE — PROGRESS NOTES
POD#2 s/p delayed bilat JAZMIN flap breast recon  No c/o  Afeb  VSS  Vioptix StO2 = 66% on right, 75% on left, both steady trends, I removed both  Awake and alert  Breasts: flaps warm and soft; incisions CDI with steri strips  Abd: soft, ND; inc CDI; umbo intact  Stable  Plan: Reg diet (diabetic)  Home today  Dr. White sent rxs electronically to the patient's pharmacy  F/up with Dr. White next Wednesday (6 days hence) - pt to call for appt time

## 2024-12-05 NOTE — PROGRESS NOTES
Nutrition Note    RD corrected wound healing supplement order. Triston for wound healing is intended to be consumed only twice daily. Order for 6 daily was reduced to BID.   Will follow with nutrition assessment per policy.     Electronically signed by LYNNE MONZON MS, RD on 12/5/24 at 9:43 AM EST  Contact via [employer] or office [phone]

## 2024-12-05 NOTE — CARE COORDINATION
I evaluated pt this am and from a case management perspective,pt is okay to be discharged with no identified needs.  Lauren Renee RN  
[place], [place] - [address] [facility] - P [phone] - F [phone]  [address] [facility] [address]  Phone: [phone] Fax: [phone]    DC Transport:  friend       Transition of care plan:    []Unable to determine at this time. Awaiting clinical progress, and disposition recommendations.    [x] Home. No assistance required.     [] Home. Pt refused recommended services.    [] Home with family assistance as needed, and outpatient follow-up.    [] Home with Outpatient PT and outpatient follow-up   Pt aware of OP appt? []Yes, Provider:   []Not scheduled   Transport provider:     [] Home with outpatient services.    Specify:    [] Home with Home Health   - [facility] of Choice offered? [] Yes, Preference:   [] NA    []SNF/IPR   -[]Freedom of Choice offered, and preferences given:   []Listing provided and preferences requested   -Status: []Pending []Accepted:    -Auth required: []Yes []No    -Auth initiated date:   -3 midnight stay required: []Yes []No  Date satisfied:     [] LTC:     [] Home with Hospice   - [facility] of Choice offered? [] Yes, Preference:   [] NA    [] [facility] information provided.     [] Other:       RISHABH BELLE RN  Case Management Department  For questions or concerns, please [employer]

## 2024-12-05 NOTE — PLAN OF CARE
Problem: Chronic Conditions and Co-morbidities  Goal: Patient's chronic conditions and co-morbidity symptoms are monitored and maintained or improved  Outcome: Adequate for Discharge  Flowsheets (Taken 12/5/2024 0800 by Anh Hart, RN)  Care Plan - Patient's Chronic Conditions and Co-Morbidity Symptoms are Monitored and Maintained or Improved: Monitor and assess patient's chronic conditions and comorbid symptoms for stability, deterioration, or improvement     Problem: Pain  Goal: Verbalizes/displays adequate comfort level or baseline comfort level  Outcome: Adequate for Discharge  Flowsheets (Taken 12/5/2024 0800 by Anh Hart, RN)  Verbalizes/displays adequate comfort level or baseline comfort level:   Encourage patient to monitor pain and request assistance   Assess pain using appropriate pain scale     Problem: Safety - Adult  Goal: Free from fall injury  Outcome: Adequate for Discharge     Problem: Discharge Planning  Goal: Discharge to home or other facility with appropriate resources  Outcome: Adequate for Discharge  Flowsheets (Taken 12/5/2024 0800 by Anh Hart RN)  Discharge to home or other facility with appropriate resources:   Identify barriers to discharge with patient and caregiver   Arrange for needed discharge resources and transportation as appropriate     Problem: Skin/Tissue Integrity  Goal: Absence of new skin breakdown  Description: 1.  Monitor for areas of redness and/or skin breakdown  2.  Assess vascular access sites hourly  3.  Every 4-6 hours minimum:  Change oxygen saturation probe site  4.  Every 4-6 hours:  If on nasal continuous positive airway pressure, respiratory therapy assess nares and determine need for appliance change or resting period.  Outcome: Adequate for Discharge     Problem: Cardiovascular - Adult  Goal: Maintains optimal cardiac output and hemodynamic stability  Outcome: Adequate for Discharge  Flowsheets (Taken 12/5/2024 0800 by Anh Hart, 
  Problem: Chronic Conditions and Co-morbidities  Goal: Patient's chronic conditions and co-morbidity symptoms are monitored and maintained or improved  Outcome: Progressing  Flowsheets (Taken 12/3/2024 2000)  Care Plan - Patient's Chronic Conditions and Co-Morbidity Symptoms are Monitored and Maintained or Improved:   Monitor and assess patient's chronic conditions and comorbid symptoms for stability, deterioration, or improvement   Collaborate with multidisciplinary team to address chronic and comorbid conditions and prevent exacerbation or deterioration   Update acute care plan with appropriate goals if chronic or comorbid symptoms are exacerbated and prevent overall improvement and discharge     Problem: Pain  Goal: Verbalizes/displays adequate comfort level or baseline comfort level  Outcome: Progressing  Flowsheets (Taken 12/3/2024 2000)  Verbalizes/displays adequate comfort level or baseline comfort level:   Encourage patient to monitor pain and request assistance   Assess pain using appropriate pain scale   Administer analgesics based on type and severity of pain and evaluate response   Implement non-pharmacological measures as appropriate and evaluate response   Consider cultural and social influences on pain and pain management   Notify Licensed Independent Practitioner if interventions unsuccessful or patient reports new pain     Problem: Safety - Adult  Goal: Free from fall injury  Outcome: Progressing     Problem: Discharge Planning  Goal: Discharge to home or other facility with appropriate resources  Outcome: Progressing  Flowsheets (Taken 12/3/2024 2000)  Discharge to home or other facility with appropriate resources:   Identify barriers to discharge with patient and caregiver   Arrange for needed discharge resources and transportation as appropriate   Identify discharge learning needs (meds, wound care, etc)   Refer to discharge planning if patient needs post-hospital services based on physician 
catheter remains patent:   Assess patency of urinary catheter   Irrigate catheter per Licensed Independent Practitioner order if indicated and notify Licensed Independent Practitioner if unable to irrigate   Assess need for a larger catheter size or a 3-way catheter for continuous bladder irrigation  12/4/2024 0652 by Bri Dc, RN  Outcome: Progressing  Flowsheets (Taken 12/3/2024 2000)  Urinary catheter remains patent:   Assess patency of urinary catheter   Irrigate catheter per Licensed Independent Practitioner order if indicated and notify Licensed Independent Practitioner if unable to irrigate     Problem: Neurosensory - Adult  Goal: Achieves stable or improved neurological status  12/4/2024 1827 by Anh Hart, RN  Outcome: Progressing  Flowsheets (Taken 12/4/2024 0800)  Achieves stable or improved neurological status: Assess for and report changes in neurological status  12/4/2024 0652 by Bri Dc, RN  Outcome: Progressing  Flowsheets (Taken 12/3/2024 2000)  Achieves stable or improved neurological status: Assess for and report changes in neurological status

## 2024-12-18 DIAGNOSIS — I10 ESSENTIAL HYPERTENSION: ICD-10-CM

## 2024-12-18 RX ORDER — AMLODIPINE BESYLATE 5 MG/1
5 TABLET ORAL EVERY MORNING
Qty: 90 TABLET | Refills: 0 | Status: SHIPPED | OUTPATIENT
Start: 2024-12-18

## 2024-12-18 NOTE — TELEPHONE ENCOUNTER
Pt left a voice message requesting a refill.     [phone]    Last appointment: 08/22/2024 MD VELASCO   Next appointment: 02/24/2025 MD Velasco   Previous refill encounter(s):   12/11/2023 Norvasc #90 with 3 refills. As of 11/19/2024 pt was reported taking 1 tab every morning.     For Pharmacy Admin Tracking Only    Program: Medication Refill  Intervention Detail: New Rx: 1, reason: Patient Preference  Time Spent (min): 5    Requested Prescriptions     Pending Prescriptions Disp Refills    amLODIPine (NORVASC) 5 MG tablet 90 tablet 0     Sig: Take 1 tablet by mouth every morning

## 2025-01-15 SDOH — ECONOMIC STABILITY: TRANSPORTATION INSECURITY
IN THE PAST 12 MONTHS, HAS LACK OF TRANSPORTATION KEPT YOU FROM MEETINGS, WORK, OR FROM GETTING THINGS NEEDED FOR DAILY LIVING?: NO

## 2025-01-15 SDOH — ECONOMIC STABILITY: FOOD INSECURITY: WITHIN THE PAST 12 MONTHS, YOU WORRIED THAT YOUR FOOD WOULD RUN OUT BEFORE YOU GOT MONEY TO BUY MORE.: NEVER TRUE

## 2025-01-15 SDOH — ECONOMIC STABILITY: INCOME INSECURITY: IN THE LAST 12 MONTHS, WAS THERE A TIME WHEN YOU WERE NOT ABLE TO PAY THE MORTGAGE OR RENT ON TIME?: NO

## 2025-01-15 SDOH — ECONOMIC STABILITY: TRANSPORTATION INSECURITY
IN THE PAST 12 MONTHS, HAS THE LACK OF TRANSPORTATION KEPT YOU FROM MEDICAL APPOINTMENTS OR FROM GETTING MEDICATIONS?: NO

## 2025-01-15 SDOH — ECONOMIC STABILITY: FOOD INSECURITY: WITHIN THE PAST 12 MONTHS, THE FOOD YOU BOUGHT JUST DIDN'T LAST AND YOU DIDN'T HAVE MONEY TO GET MORE.: NEVER TRUE

## 2025-01-16 ENCOUNTER — OFFICE VISIT (OUTPATIENT)
Age: 65
End: 2025-01-16
Payer: MEDICAID

## 2025-01-16 VITALS
SYSTOLIC BLOOD PRESSURE: 130 MMHG | BODY MASS INDEX: 39.06 KG/M2 | DIASTOLIC BLOOD PRESSURE: 83 MMHG | HEART RATE: 103 BPM | OXYGEN SATURATION: 93 % | WEIGHT: 242 LBS | TEMPERATURE: 99.5 F

## 2025-01-16 DIAGNOSIS — M17.0 BILATERAL PRIMARY OSTEOARTHRITIS OF KNEE: ICD-10-CM

## 2025-01-16 DIAGNOSIS — E66.01 CLASS 2 SEVERE OBESITY DUE TO EXCESS CALORIES WITH SERIOUS COMORBIDITY AND BODY MASS INDEX (BMI) OF 39.0 TO 39.9 IN ADULT: Primary | ICD-10-CM

## 2025-01-16 DIAGNOSIS — E55.9 VITAMIN D DEFICIENCY: ICD-10-CM

## 2025-01-16 DIAGNOSIS — C50.919 MALIGNANT NEOPLASM OF BREAST IN FEMALE, ESTROGEN RECEPTOR POSITIVE, UNSPECIFIED LATERALITY, UNSPECIFIED SITE OF BREAST (HCC): ICD-10-CM

## 2025-01-16 DIAGNOSIS — E78.00 ELEVATED CHOLESTEROL: ICD-10-CM

## 2025-01-16 DIAGNOSIS — R00.0 SINUS TACHYCARDIA: ICD-10-CM

## 2025-01-16 DIAGNOSIS — I10 ESSENTIAL HYPERTENSION: ICD-10-CM

## 2025-01-16 DIAGNOSIS — Z17.0 MALIGNANT NEOPLASM OF BREAST IN FEMALE, ESTROGEN RECEPTOR POSITIVE, UNSPECIFIED LATERALITY, UNSPECIFIED SITE OF BREAST (HCC): ICD-10-CM

## 2025-01-16 DIAGNOSIS — E66.812 CLASS 2 SEVERE OBESITY DUE TO EXCESS CALORIES WITH SERIOUS COMORBIDITY AND BODY MASS INDEX (BMI) OF 39.0 TO 39.9 IN ADULT: Primary | ICD-10-CM

## 2025-01-16 DIAGNOSIS — E11.9 TYPE 2 DIABETES MELLITUS WITHOUT COMPLICATION, WITHOUT LONG-TERM CURRENT USE OF INSULIN (HCC): ICD-10-CM

## 2025-01-16 PROCEDURE — 99214 OFFICE O/P EST MOD 30 MIN: CPT | Performed by: STUDENT IN AN ORGANIZED HEALTH CARE EDUCATION/TRAINING PROGRAM

## 2025-01-16 PROCEDURE — 3075F SYST BP GE 130 - 139MM HG: CPT | Performed by: STUDENT IN AN ORGANIZED HEALTH CARE EDUCATION/TRAINING PROGRAM

## 2025-01-16 PROCEDURE — 3079F DIAST BP 80-89 MM HG: CPT | Performed by: STUDENT IN AN ORGANIZED HEALTH CARE EDUCATION/TRAINING PROGRAM

## 2025-01-16 RX ORDER — OLANZAPINE 5 MG/1
5 TABLET ORAL NIGHTLY
COMMUNITY
Start: 2025-01-03

## 2025-01-16 RX ORDER — AMLODIPINE BESYLATE 5 MG/1
5 TABLET ORAL EVERY MORNING
Qty: 90 TABLET | Refills: 3 | Status: SHIPPED | OUTPATIENT
Start: 2025-01-16

## 2025-01-16 RX ORDER — ROSUVASTATIN CALCIUM 10 MG/1
10 TABLET, COATED ORAL
Qty: 90 TABLET | Refills: 3 | Status: SHIPPED | OUTPATIENT
Start: 2025-01-16

## 2025-01-16 RX ORDER — METHYLPREDNISOLONE 4 MG/1
TABLET ORAL
Qty: 1 KIT | Refills: 0 | Status: SHIPPED | OUTPATIENT
Start: 2025-01-16 | End: 2025-01-22

## 2025-01-16 ASSESSMENT — PATIENT HEALTH QUESTIONNAIRE - PHQ9
SUM OF ALL RESPONSES TO PHQ9 QUESTIONS 1 & 2: 0
SUM OF ALL RESPONSES TO PHQ QUESTIONS 1-9: 0
SUM OF ALL RESPONSES TO PHQ QUESTIONS 1-9: 0
2. FEELING DOWN, DEPRESSED OR HOPELESS: NOT AT ALL
1. LITTLE INTEREST OR PLEASURE IN DOING THINGS: NOT AT ALL
SUM OF ALL RESPONSES TO PHQ QUESTIONS 1-9: 0
SUM OF ALL RESPONSES TO PHQ QUESTIONS 1-9: 0

## 2025-01-16 ASSESSMENT — ENCOUNTER SYMPTOMS
ABDOMINAL PAIN: 0
BLOOD IN STOOL: 0
SHORTNESS OF BREATH: 0
CONSTIPATION: 0
VOMITING: 0
BACK PAIN: 1
COUGH: 0
DIARRHEA: 0
NAUSEA: 0

## 2025-01-16 NOTE — PROGRESS NOTES
RM    Chief Complaint   Patient presents with    Follow-up     Would like a wt loss,arthritis .       Vitals:    01/16/25 1545   BP: 130/83   Site: Left Upper Arm   Position: Sitting   Pulse: (!) 103   Temp: 99.5 °F (37.5 °C)   TempSrc: Oral   SpO2: 93%   Weight: 109.8 kg (242 lb)        \"Have you been to the ER, urgent care clinic since your last visit?  Hospitalized since your last visit?\"    NO    “Have you seen or consulted any other health care providers outside of [facility] since your last visit?”    NO            Click Here for Release of Records Request   AVS  education, follow up, and recommendations provided and addressed with patient.  services used to advise patient No  
authenticate this note.    --Rosa Velasco MD

## 2025-01-21 ENCOUNTER — TELEPHONE (OUTPATIENT)
Age: 65
End: 2025-01-21

## 2025-01-21 DIAGNOSIS — E11.9 TYPE 2 DIABETES MELLITUS WITHOUT COMPLICATION, WITHOUT LONG-TERM CURRENT USE OF INSULIN: ICD-10-CM

## 2025-01-21 DIAGNOSIS — E66.812 CLASS 2 SEVERE OBESITY DUE TO EXCESS CALORIES WITH SERIOUS COMORBIDITY AND BODY MASS INDEX (BMI) OF 39.0 TO 39.9 IN ADULT: ICD-10-CM

## 2025-01-21 DIAGNOSIS — E66.01 CLASS 2 SEVERE OBESITY DUE TO EXCESS CALORIES WITH SERIOUS COMORBIDITY AND BODY MASS INDEX (BMI) OF 39.0 TO 39.9 IN ADULT: ICD-10-CM

## 2025-01-21 NOTE — TELEPHONE ENCOUNTER
The Ozempic 8 mg/3 ml (2 mg/dose) is not covered by pt's insurance. Per [facility] #[id number]. Suggested alternatives are:  - Victoza 2-kathy 18 mg/3 ml pen,   - Byetta 5 mcg dose pen.   Please review the suggested alternatives or obtain a Prior Authorization.     For Pharmacy Admin Tracking Only    Program: Medication Refill  Intervention Detail: New Rx: 1, reason: Patient Preference  Time Spent (min): 5    Requested Prescriptions     Pending Prescriptions Disp Refills    semaglutide, 2 MG/DOSE, (OZEMPIC) 8 MG/3ML SOPN sc injection 3 mL 5     Sig: Inject 2 mg into the skin every 7 days

## 2025-01-23 ENCOUNTER — HOSPITAL ENCOUNTER (OUTPATIENT)
Facility: HOSPITAL | Age: 65
Setting detail: RECURRING SERIES
Discharge: HOME | End: 2025-01-26
Attending: STUDENT IN AN ORGANIZED HEALTH CARE EDUCATION/TRAINING PROGRAM
Payer: MEDICAID

## 2025-01-23 PROCEDURE — 97162 PT EVAL MOD COMPLEX 30 MIN: CPT

## 2025-01-23 PROCEDURE — 97110 THERAPEUTIC EXERCISES: CPT

## 2025-01-23 NOTE — THERAPY EVALUATION
[facility]  [address]  [place] [address]  Phone: [phone]   Fax: [phone]             PHYSICAL THERAPY - EVALUATION/PLAN OF CARE NOTE (updated 3/23)      Date: 2025          Patient Name:  Nyasia Rodriguez :  1960   Medical   Diagnosis:  Bilateral primary osteoarthritis of knee [M17.0] Treatment Diagnosis:  M25.561  RIGHT KNEE PAIN and M25.562  LEFT KNEE PAIN    Referral Source:  Rosa Velasco MD Provider #:  [id number]                Insurance: Payor: [employer] MEDICAID / Plan: [facility] [facility] [employer] CARDINAL CARE / Product Type: *No Product type* /      Patient  verified yes     Visit #   Current  / Total 1 24   Time   In / Out 11:40 12:29   Total Treatment Time 49   Total Timed Codes 19         SUBJECTIVE  Pain Level (0-10 scale): 5  [x]constant []intermittent []improving []worsening []no change since onset    Any medication changes, allergies to medications, adverse drug reactions, diagnosis change, or new procedure performed?: [x] No    [] Yes (see summary sheet for update)  Medications: Verified on Patient Summary List    Subjective functional status/changes:     Patient is a 64 year old presenting to therapy today with a chief complaint of bilateral knee pain. Patient reports that she has dealt with knee [facility] however she has been dealing with cancer since , and reports that her endurance and strength have decrease significantly. She reports that as she has gotten weaker, her knee pain has increased as well. Patient notes that the right knee pain is constant, but the left is intermittent. She also notes that her right knee has \"given out\" on her a few times, especially with descending stairs. Aggravating factors include walking, stairs, getting on and off the floor. Alleviating factors include rest and pain medications. Goals for therapy are to improve

## 2025-01-29 ENCOUNTER — HOSPITAL ENCOUNTER (OUTPATIENT)
Facility: HOSPITAL | Age: 65
Setting detail: RECURRING SERIES
Discharge: HOME | End: 2025-02-01
Attending: STUDENT IN AN ORGANIZED HEALTH CARE EDUCATION/TRAINING PROGRAM
Payer: MEDICAID

## 2025-01-29 PROCEDURE — 97110 THERAPEUTIC EXERCISES: CPT
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
## 2025-01-31 ENCOUNTER — HOSPITAL ENCOUNTER (OUTPATIENT)
Facility: HOSPITAL | Age: 65
Setting detail: RECURRING SERIES
End: 2025-01-31
Attending: STUDENT IN AN ORGANIZED HEALTH CARE EDUCATION/TRAINING PROGRAM
Payer: MEDICAID

## 2025-01-31 PROCEDURE — 97110 THERAPEUTIC EXERCISES: CPT

## 2025-01-31 NOTE — PROGRESS NOTES
PHYSICAL THERAPY - DAILY TREATMENT NOTE (updated 3/23)      Date: 2025          Patient Name:  Nyasia Rodriguez :  1960   Medical   Diagnosis:  Pain in right knee [M25.561]  Pain in left knee [M25.562] Treatment Diagnosis:  M25.561  RIGHT KNEE PAIN and M25.562  LEFT KNEE PAIN    Referral Source:  Rosa Velasco MD Insurance:   Payor: [facility] MEDICAID / Plan: [facility] CARDINAL CARE / Product Type: *No Product type* /                     Patient  verified yes     Visit #   Current  / Total 3 24   Time   In / Out 9:30 10:21   Total Treatment Time 51   Total Timed Codes 41         SUBJECTIVE    Pain Level (0-10 scale): 4    Any medication changes, allergies to medications, adverse drug reactions, diagnosis change, or new procedure performed?: [x] No    [] Yes (see summary sheet for update)  Medications: Verified on Patient Summary List    Subjective functional status/changes:     Patient reports that she has been feeling better since last session, just has some muscle soreness after doing her exercises.     OBJECTIVE      Therapeutic Procedures:  Tx Min Billable or 1:1 Min (if diff from Tx Min) Procedure, Rationale, Specifics   41  [id number] Therapeutic Exercise (timed):  increase ROM, strength, coordination, balance, and proprioception to improve patient's ability to progress to PLOF and address remaining functional goals. (see flow sheet as applicable)     Details if applicable:            Details if applicable:           Details if applicable:           Details if applicable:            Details if applicable:     41     Total Total     Modalities Rationale:     decrease pain to improve patient's ability to progress to PLOF and address remaining functional goals.       min [] Estim Unattended,             type/location:       []  w/ice    []  w/heat        min [] Estim Attended,             type/location:       []  w/ice   []  w/heat         []  w/US   []  TENS insruct            min []

## 2025-02-03 ENCOUNTER — HOSPITAL ENCOUNTER (OUTPATIENT)
Facility: HOSPITAL | Age: 65
Setting detail: RECURRING SERIES
Discharge: HOME | End: 2025-02-06
Attending: STUDENT IN AN ORGANIZED HEALTH CARE EDUCATION/TRAINING PROGRAM
Payer: MEDICAID

## 2025-02-03 PROCEDURE — 97110 THERAPEUTIC EXERCISES: CPT

## 2025-02-03 NOTE — PROGRESS NOTES
indicate improved LE strength and endurance to improve walking tolerance. - progressing  3) The patient will transfer sit to stand without UE assistance to improve functional mobility in home setting. - progressing     Long Term Goals: To be accomplished in 20-24 treatments.  1) The patient will demonstrate 5/5 LE strength to allow for ambulation up and down a minimum of 4 standard stairs- progressing  2) The patient will subjectively report the ability to ambulate on uneven surfaces without fear of falling x 500 ft- progressing  3) The patient will demonstrate independence with finalized HEP without v.c. needed to allow for all transfers, ambulation x 500 ft and all in home functional mobilty- progressing  4) The patient will negotiate 12 steps reciprocally without an increase in pain from baseline level or without reported fear of falling and without UE assistance on railing to improve safety in the home.  - progressing         PLAN  Yes  Continue plan of care  Re-Cert Due: NA  [x]  Upgrade activities as tolerated  []  Discharge due to:  []  Other:      Khoi Fair, PTA       2/3/2025       11:55 AM

## 2025-02-05 ENCOUNTER — PATIENT MESSAGE (OUTPATIENT)
Age: 65
End: 2025-02-05

## 2025-02-05 PROBLEM — E88.810 METABOLIC SYNDROME: Status: ACTIVE | Noted: 2025-02-05

## 2025-02-06 ENCOUNTER — HOSPITAL ENCOUNTER (OUTPATIENT)
Facility: HOSPITAL | Age: 65
Setting detail: RECURRING SERIES
Discharge: HOME | End: 2025-02-09
Attending: STUDENT IN AN ORGANIZED HEALTH CARE EDUCATION/TRAINING PROGRAM
Payer: MEDICAID

## 2025-02-06 PROCEDURE — 97110 THERAPEUTIC EXERCISES: CPT
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
## 2025-02-10 ENCOUNTER — APPOINTMENT (OUTPATIENT)
Facility: HOSPITAL | Age: 65
End: 2025-02-10
Attending: STUDENT IN AN ORGANIZED HEALTH CARE EDUCATION/TRAINING PROGRAM
Payer: MEDICAID

## 2025-02-13 ENCOUNTER — APPOINTMENT (OUTPATIENT)
Facility: HOSPITAL | Age: 65
End: 2025-02-13
Attending: STUDENT IN AN ORGANIZED HEALTH CARE EDUCATION/TRAINING PROGRAM
Payer: MEDICAID

## 2025-02-17 ENCOUNTER — APPOINTMENT (OUTPATIENT)
Facility: HOSPITAL | Age: 65
End: 2025-02-17
Attending: STUDENT IN AN ORGANIZED HEALTH CARE EDUCATION/TRAINING PROGRAM
Payer: MEDICAID

## 2025-02-20 ENCOUNTER — APPOINTMENT (OUTPATIENT)
Facility: HOSPITAL | Age: 65
End: 2025-02-20
Attending: STUDENT IN AN ORGANIZED HEALTH CARE EDUCATION/TRAINING PROGRAM
Payer: MEDICAID

## 2025-02-20 ENCOUNTER — HOSPITAL ENCOUNTER (OUTPATIENT)
Facility: HOSPITAL | Age: 65
Setting detail: RECURRING SERIES
Discharge: HOME | End: 2025-02-23
Attending: STUDENT IN AN ORGANIZED HEALTH CARE EDUCATION/TRAINING PROGRAM
Payer: MEDICAID

## 2025-02-20 PROCEDURE — 97110 THERAPEUTIC EXERCISES: CPT
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
## 2025-02-20 NOTE — PROGRESS NOTES
[facility]  [address]  [place] [address]  Phone: [phone]   Fax: [phone]     PHYSICAL THERAPY PROGRESS NOTE  Patient Name:  Nyasia Rodriguez :  1960   Treatment/Medical Diagnosis: Pain in right knee [M25.561]  Pain in left knee [M25.562]   Referral Source:  Rosa Velasco MD     Date of Initial Visit:  2025 Attended Visits:  6 Missed Visits:  2     SUMMARY OF TREATMENT/ASSESSMENT:  Patient is a 64 year old being seen for bilateral knee pain. Patient has been seen for 6 visits and has been treated using therapeutic exercise to make improvements with lower quarter strength, ROM, endurance, and gait and balance.      CURRENT STATUS/GOALS:    Patient presents today with improved symptoms. Her recent flare in symptoms appears to be related to recent COVID diagnosis, as now that patient has recovered her full body pain has resolved. Patient also does demonstrate overall improvements, including knee ROM, lower quarter strength, endurance, balance, and overall mobility. Patient now demonstrates full knee extension bilaterally (vs lacking 5 degrees). Her LE strength improvements include right hip flexion, knee extension, and knee flexion to 4+/5 (vs 4/5). Her right hip abduction strength also improved to 4/5 (vs 3+/5). Her 30 second sit to stand remained the same, however her single leg stance on the right improved to 20 seconds (vs 8 seconds). Her gait speed improved as well. Patient has met 2/7 goals for discharge, however her overall progress has been limited secondary to sickness. She would continue to benefit from skilled physical therapy to address remaining goals and move towards PLOF. Plan to continue to progress as able.     Short Term Goals: To be accomplished in 8-10 treatments.  1) The patient will be independent with introductory HEP with no v.c. - MET  2) Patient will demonstrate > 12 sit to

## 2025-02-24 ENCOUNTER — HOSPITAL ENCOUNTER (OUTPATIENT)
Facility: HOSPITAL | Age: 65
Setting detail: RECURRING SERIES
Discharge: HOME | End: 2025-02-27
Attending: STUDENT IN AN ORGANIZED HEALTH CARE EDUCATION/TRAINING PROGRAM
Payer: MEDICAID

## 2025-02-24 PROCEDURE — 97110 THERAPEUTIC EXERCISES: CPT

## 2025-02-24 NOTE — PROGRESS NOTES
PHYSICAL THERAPY - DAILY TREATMENT NOTE (updated 3/23)      Date: 2025          Patient Name:  Nyasia Rodriguez :  1960   Medical   Diagnosis:  Pain in right knee [M25.561]  Pain in left knee [M25.562] Treatment Diagnosis:  M25.561  RIGHT KNEE PAIN and M25.562  LEFT KNEE PAIN    Referral Source:  Rosa Velasco MD Insurance:   Payor: [facility] MEDICAID / Plan: [facility] CARDINAL CARE / Product Type: *No Product type* /                     Patient  verified yes     Visit #   Current  / Total 7 24   Time   In / Out 11:30 A 12:21 P   Total Treatment Time 51   Total Timed Codes 41       SUBJECTIVE    Pain Level (0-10 scale): 2-3/10    Any medication changes, allergies to medications, adverse drug reactions, diagnosis change, or new procedure performed?: [x] No    [] Yes (see summary sheet for update)  Medications: Verified on Patient Summary List    Subjective functional status/changes:     Patient reports decreased pain and improved tolerance with activities now that she is recovering from covid     OBJECTIVE      Therapeutic Procedures:  Tx Min Billable or 1:1 Min (if diff from Tx Min) Procedure, Rationale, Specifics   41  [id number] Therapeutic Exercise (timed):  increase ROM, strength, coordination, balance, and proprioception to improve patient's ability to progress to PLOF and address remaining functional goals. (see flow sheet as applicable)     Details if applicable:            Details if applicable:           Details if applicable:           Details if applicable:            Details if applicable:     41     Total Total     Modalities Rationale:     decrease pain to improve patient's ability to progress to PLOF and address remaining functional goals.       min [] Estim Unattended,             type/location:       []  w/ice    []  w/heat        min [] Estim Attended,             type/location:       []  w/ice   []  w/heat         []  w/US   []  TENS insruct            min []  Mechanical

## 2025-02-27 ENCOUNTER — HOSPITAL ENCOUNTER (OUTPATIENT)
Facility: HOSPITAL | Age: 65
Setting detail: RECURRING SERIES
End: 2025-02-27
Attending: STUDENT IN AN ORGANIZED HEALTH CARE EDUCATION/TRAINING PROGRAM
Payer: MEDICAID

## 2025-02-27 PROCEDURE — 97110 THERAPEUTIC EXERCISES: CPT

## 2025-02-27 NOTE — PROGRESS NOTES
PHYSICAL THERAPY - DAILY TREATMENT NOTE (updated 3/23)      Date: 2025          Patient Name:  Nyasia Rodriguez :  1960   Medical   Diagnosis:  Pain in right knee [M25.561]  Pain in left knee [M25.562] Treatment Diagnosis:  M25.561  RIGHT KNEE PAIN and M25.562  LEFT KNEE PAIN    Referral Source:  Rosa Velasco MD Insurance:   Payor: [facility] MEDICAID / Plan: [facility] CARDINAL CARE / Product Type: *No Product type* /                     Patient  verified yes     Visit #   Current  / Total 8 24   Time   In / Out 11:30  12:26   Total Treatment Time 56   Total Timed Codes 46       SUBJECTIVE    Pain Level (0-10 scale): 2-3/10    Any medication changes, allergies to medications, adverse drug reactions, diagnosis change, or new procedure performed?: [x] No    [] Yes (see summary sheet for update)  Medications: Verified on Patient Summary List    Subjective functional status/changes:     Patient reports that she had some back pain after last session, but it is gradually improving.     OBJECTIVE      Therapeutic Procedures:  Tx Min Billable or 1:1 Min (if diff from Tx Min) Procedure, Rationale, Specifics   46  [id number] Therapeutic Exercise (timed):  increase ROM, strength, coordination, balance, and proprioception to improve patient's ability to progress to PLOF and address remaining functional goals. (see flow sheet as applicable)     Details if applicable:            Details if applicable:           Details if applicable:           Details if applicable:            Details if applicable:     46     Total Total     Modalities Rationale:     decrease pain to improve patient's ability to progress to PLOF and address remaining functional goals.       min [] Estim Unattended,             type/location:       []  w/ice    []  w/heat        min [] Estim Attended,             type/location:       []  w/ice   []  w/heat         []  w/US   []  TENS insruct            min []  Mechanical Traction,         assistance to improve functional mobility in home setting. - MET     Long Term Goals: To be accomplished in 20-24 treatments.  1) The patient will demonstrate 5/5 LE strength to allow for ambulation up and down a minimum of 4 standard stairs- progressing  2) The patient will subjectively report the ability to ambulate on uneven surfaces without fear of falling x 500 ft- progressing  3) The patient will demonstrate independence with finalized HEP without v.c. needed to allow for all transfers, ambulation x 500 ft and all in home functional mobilty- progressing  4) The patient will negotiate 12 steps reciprocally without an increase in pain from baseline level or without reported fear of falling and without UE assistance on railing to improve safety in the home.  - progressing       PLAN  Yes  Continue plan of care  Re-Cert Due: NA  [x]  Upgrade activities as tolerated  []  Discharge due to:  []  Other:      Kaleigh Dowd, PT       2/27/2025       9:27 AM

## 2025-03-04 ENCOUNTER — HOSPITAL ENCOUNTER (OUTPATIENT)
Facility: HOSPITAL | Age: 65
Setting detail: RECURRING SERIES
Discharge: HOME | End: 2025-03-07
Attending: STUDENT IN AN ORGANIZED HEALTH CARE EDUCATION/TRAINING PROGRAM
Payer: MEDICAID

## 2025-03-04 PROCEDURE — 97110 THERAPEUTIC EXERCISES: CPT

## 2025-03-04 NOTE — PROGRESS NOTES
indicate improved LE strength and endurance to improve walking tolerance. - progressing  3) The patient will transfer sit to stand without UE assistance to improve functional mobility in home setting. - MET     Long Term Goals: To be accomplished in 20-24 treatments.  1) The patient will demonstrate 5/5 LE strength to allow for ambulation up and down a minimum of 4 standard stairs- progressing  2) The patient will subjectively report the ability to ambulate on uneven surfaces without fear of falling x 500 ft- progressing  3) The patient will demonstrate independence with finalized HEP without v.c. needed to allow for all transfers, ambulation x 500 ft and all in home functional mobilty- progressing  4) The patient will negotiate 12 steps reciprocally without an increase in pain from baseline level or without reported fear of falling and without UE assistance on railing to improve safety in the home.  - progressing       PLAN  Yes  Continue plan of care  Re-Cert Due: NA  [x]  Upgrade activities as tolerated  []  Discharge due to:  []  Other:      Kaleigh Dowd, PT       3/4/2025       10:17 AM

## 2025-03-07 ENCOUNTER — HOSPITAL ENCOUNTER (OUTPATIENT)
Facility: HOSPITAL | Age: 65
Setting detail: RECURRING SERIES
Discharge: HOME | End: 2025-03-10
Attending: STUDENT IN AN ORGANIZED HEALTH CARE EDUCATION/TRAINING PROGRAM
Payer: MEDICAID

## 2025-03-07 PROCEDURE — 97110 THERAPEUTIC EXERCISES: CPT

## 2025-03-07 NOTE — PROGRESS NOTES
PHYSICAL THERAPY - DAILY TREATMENT NOTE (updated 3/23)      Date: 3/7/2025          Patient Name:  Nyasia Rodriguez :  1960   Medical   Diagnosis:  Pain in right knee [M25.561]  Pain in left knee [M25.562] Treatment Diagnosis:  M25.561  RIGHT KNEE PAIN and M25.562  LEFT KNEE PAIN    Referral Source:  Rosa Velasco MD Insurance:   Payor: [facility] MEDICAID / Plan: [facility] CARDINAL CARE / Product Type: *No Product type* /                     Patient  verified yes     Visit #   Current  / Total 10 24   Time   In / Out 11:00  11:49   Total Treatment Time 49   Total Timed Codes 49       SUBJECTIVE    Pain Level (0-10 scale): 4/10    Any medication changes, allergies to medications, adverse drug reactions, diagnosis change, or new procedure performed?: [x] No    [] Yes (see summary sheet for update)  Medications: Verified on Patient Summary List    Subjective functional status/changes:     Patient reports that her knees feel like they are coming along, but the back pain has persisted. Notes that she has an appointment today to get evaluated for her back pain.     OBJECTIVE      Therapeutic Procedures:  Tx Min Billable or 1:1 Min (if diff from Tx Min) Procedure, Rationale, Specifics   49  [id number] Therapeutic Exercise (timed):  increase ROM, strength, coordination, balance, and proprioception to improve patient's ability to progress to PLOF and address remaining functional goals. (see flow sheet as applicable)     Details if applicable:            Details if applicable:           Details if applicable:           Details if applicable:            Details if applicable:     49     Total Total     Modalities Rationale:     decrease pain to improve patient's ability to progress to PLOF and address remaining functional goals.       min [] Estim Unattended,             type/location:       []  w/ice    []  w/heat        min [] Estim Attended,             type/location:       []  w/ice   []  w/heat

## 2025-03-14 ENCOUNTER — HOSPITAL ENCOUNTER (OUTPATIENT)
Facility: HOSPITAL | Age: 65
Setting detail: RECURRING SERIES
End: 2025-03-14
Attending: STUDENT IN AN ORGANIZED HEALTH CARE EDUCATION/TRAINING PROGRAM
Payer: MEDICAID

## 2025-03-20 ENCOUNTER — HOSPITAL ENCOUNTER (OUTPATIENT)
Facility: HOSPITAL | Age: 65
Setting detail: RECURRING SERIES
End: 2025-03-20
Attending: STUDENT IN AN ORGANIZED HEALTH CARE EDUCATION/TRAINING PROGRAM
Payer: MEDICAID

## 2025-03-24 ENCOUNTER — APPOINTMENT (OUTPATIENT)
Facility: HOSPITAL | Age: 65
End: 2025-03-24
Attending: STUDENT IN AN ORGANIZED HEALTH CARE EDUCATION/TRAINING PROGRAM
Payer: MEDICAID

## 2025-03-27 ENCOUNTER — HOSPITAL ENCOUNTER (OUTPATIENT)
Facility: HOSPITAL | Age: 65
Setting detail: RECURRING SERIES
Discharge: HOME | End: 2025-03-30
Attending: STUDENT IN AN ORGANIZED HEALTH CARE EDUCATION/TRAINING PROGRAM
Payer: MEDICAID

## 2025-03-27 PROCEDURE — 97110 THERAPEUTIC EXERCISES: CPT

## 2025-03-27 NOTE — PROGRESS NOTES
PHYSICAL THERAPY - DAILY TREATMENT NOTE (updated 3/23)      Date: 3/27/2025          Patient Name:  Nyasia Rodriguez :  1960   Medical   Diagnosis:  Pain in right knee [M25.561]  Pain in left knee [M25.562] Treatment Diagnosis:  M25.561  RIGHT KNEE PAIN and M25.562  LEFT KNEE PAIN    Referral Source:  Rosa Velasco MD Insurance:   Payor: [facility] MEDICAID / Plan: [facility] CARDINAL CARE / Product Type: *No Product type* /                     Patient  verified yes     Visit #   Current  / Total 11 24   Time   In / Out 11:30  12:04   Total Treatment Time 34   Total Timed Codes 34       SUBJECTIVE    Pain Level (0-10 scale): 2-3/10    Any medication changes, allergies to medications, adverse drug reactions, diagnosis change, or new procedure performed?: [x] No    [] Yes (see summary sheet for update)  Medications: Verified on Patient Summary List    Subjective functional status/changes:     Patient reports that she was recently admitted to the hospital for severe back pain, and unfortunately found out that her cancer has spread to her spine. Patient notes that they are still forming a plan, but she should be starting treatment soon. She reports that her knee pain overall has been much better, but she does still feel that her endurance isn't where she would like to be. Reports that she feels comfortable wrapping up this POC for her knees, but would like to return for PT for general weakness as she moves forward with her cancer treatment.     OBJECTIVE      Therapeutic Procedures:  Tx Min Billable or 1:1 Min (if diff from Tx Min) Procedure, Rationale, Specifics   34  23967 Therapeutic Exercise (timed):  increase ROM, strength, coordination, balance, and proprioception to improve patient's ability to progress to PLOF and address remaining functional goals. (see flow sheet as applicable)     Details if applicable:            Details if applicable:           Details if applicable:           Details

## 2025-04-04 DIAGNOSIS — M54.50 CHRONIC BILATERAL LOW BACK PAIN, UNSPECIFIED WHETHER SCIATICA PRESENT: Primary | ICD-10-CM

## 2025-04-04 DIAGNOSIS — R53.1 GENERALIZED WEAKNESS: ICD-10-CM

## 2025-04-04 DIAGNOSIS — G89.29 CHRONIC BILATERAL LOW BACK PAIN, UNSPECIFIED WHETHER SCIATICA PRESENT: Primary | ICD-10-CM

## 2025-04-04 NOTE — PROGRESS NOTES
Referral for PT placed.    Orders Placed This Encounter    [facility] - Physical Therapy at [facility]     Referral Priority:   Routine     Referral Type:   Physical Therapy     Referral Reason:   Specialty Services Required     Requested Specialty:   Physical Therapy     Number of Visits Requested:   1         Rosa Velasco MD

## 2025-04-04 NOTE — PROGRESS NOTES
[facility] Physical Therapy  [address]  [place] [address]  Phone: [phone]   Fax: [phone]     DISCHARGE SUMMARY  Patient Name: Nyasia Rodriguez : 1960   Treatment/Medical Diagnosis: Pain in right knee [M25.561]  Pain in left knee [M25.562]   Referral Source: Rosa Velasco MD     Date of Initial Visit: 2025 Attended Visits: 11 Missed Visits: 6     SUMMARY OF TREATMENT  Patient is a 64 year old being seen for bilateral knee pain. Patient has been seen for 11 visits and has been treated using therapeutic exercise to make improvements with lower quarter strength, ROM, endurance, and gait and balance     CURRENT STATUS  Patient presents today without new knee symptoms. Since the initial evaluation patient does demonstrate overall improvements, including knee ROM, lower quarter strength, endurance, balance, and overall mobility. Patient now demonstrates full knee extension bilaterally (vs lacking 5 degrees). Her LE strength improvements include right hip flexion, knee extension, and knee flexion to 4+/5 (vs 4/5). Her right hip abduction strength also improved to 4/5 (vs 3+/5). Patient's single leg stance improved bilaterally, with right to 60 seconds (vs 8 seconds) and left to 45 seconds (vs 20 seconds). Her FOTO score improved to 48/100 (vs 45/100). Patient feels comfortable moving forward with her HEP to manage her knee pain, however as mentioned in the subjective portion of this note, patient did recently find out that her cancer has metastasized to her thoracic and lumbar spine. Because of this she would like to move forward with scheduling an evaluation to be treated for core stability and general strength and endurance. Therefore discharging today and hopefully scheduling a new evaluation for generalized weakness soon.     Short Term Goals: To be accomplished in 8-10 treatments.  1) The patient will be independent

## 2025-04-22 ENCOUNTER — ANESTHESIA (OUTPATIENT)
Facility: HOSPITAL | Age: 65
End: 2025-04-22
Payer: MEDICAID

## 2025-04-22 ENCOUNTER — HOSPITAL ENCOUNTER (OUTPATIENT)
Facility: HOSPITAL | Age: 65
Setting detail: OUTPATIENT SURGERY
Discharge: HOME | End: 2025-04-22
Attending: INTERNAL MEDICINE | Admitting: INTERNAL MEDICINE
Payer: MEDICAID

## 2025-04-22 ENCOUNTER — ANESTHESIA EVENT (OUTPATIENT)
Facility: HOSPITAL | Age: 65
End: 2025-04-22
Payer: MEDICAID

## 2025-04-22 VITALS
HEIGHT: 66 IN | HEART RATE: 110 BPM | TEMPERATURE: 97.4 F | BODY MASS INDEX: 35.36 KG/M2 | SYSTOLIC BLOOD PRESSURE: 109 MMHG | DIASTOLIC BLOOD PRESSURE: 68 MMHG | WEIGHT: 220 LBS | RESPIRATION RATE: 18 BRPM | OXYGEN SATURATION: 98 %

## 2025-04-22 PROCEDURE — 7100000011 HC PHASE II RECOVERY - ADDTL 15 MIN: Performed by: INTERNAL MEDICINE

## 2025-04-22 PROCEDURE — 3700000001 HC ADD 15 MINUTES (ANESTHESIA): Performed by: INTERNAL MEDICINE

## 2025-04-22 PROCEDURE — 3700000000 HC ANESTHESIA ATTENDED CARE: Performed by: INTERNAL MEDICINE

## 2025-04-22 PROCEDURE — 3600007512: Performed by: INTERNAL MEDICINE

## 2025-04-22 PROCEDURE — 7100000010 HC PHASE II RECOVERY - FIRST 15 MIN: Performed by: INTERNAL MEDICINE

## 2025-04-22 PROCEDURE — 2709999900 HC NON-CHARGEABLE SUPPLY: Performed by: INTERNAL MEDICINE

## 2025-04-22 PROCEDURE — 3600007502: Performed by: INTERNAL MEDICINE

## 2025-04-22 PROCEDURE — 6360000002 HC RX W HCPCS: Performed by: ANESTHESIOLOGY

## 2025-04-22 PROCEDURE — 2580000003 HC RX 258: Performed by: INTERNAL MEDICINE

## 2025-04-22 RX ORDER — DULAGLUTIDE 4.5 MG/.5ML
4.5 INJECTION, SOLUTION SUBCUTANEOUS
COMMUNITY
Start: 2025-04-07

## 2025-04-22 RX ORDER — SODIUM CHLORIDE 9 MG/ML
INJECTION, SOLUTION INTRAVENOUS CONTINUOUS
Status: DISCONTINUED | OUTPATIENT
Start: 2025-04-22 | End: 2025-04-22 | Stop reason: HOSPADM

## 2025-04-22 RX ORDER — SODIUM CHLORIDE 0.9 % (FLUSH) 0.9 %
5-40 SYRINGE (ML) INJECTION EVERY 12 HOURS SCHEDULED
Status: DISCONTINUED | OUTPATIENT
Start: 2025-04-22 | End: 2025-04-22 | Stop reason: HOSPADM

## 2025-04-22 RX ORDER — FENTANYL 50 UG/1
1 PATCH TRANSDERMAL
COMMUNITY

## 2025-04-22 RX ORDER — SODIUM CHLORIDE 0.9 % (FLUSH) 0.9 %
5-40 SYRINGE (ML) INJECTION PRN
Status: DISCONTINUED | OUTPATIENT
Start: 2025-04-22 | End: 2025-04-22 | Stop reason: HOSPADM

## 2025-04-22 RX ORDER — LIDOCAINE HYDROCHLORIDE 20 MG/ML
INJECTION, SOLUTION EPIDURAL; INFILTRATION; INTRACAUDAL; PERINEURAL
Status: DISCONTINUED | OUTPATIENT
Start: 2025-04-22 | End: 2025-04-22 | Stop reason: SDUPTHER

## 2025-04-22 RX ORDER — HYDROMORPHONE HYDROCHLORIDE 8 MG/1
8 TABLET ORAL
COMMUNITY
Start: 2025-04-21

## 2025-04-22 RX ORDER — FENTANYL 25 UG/1
1 PATCH TRANSDERMAL
COMMUNITY
Start: 2025-04-09

## 2025-04-22 RX ORDER — SODIUM CHLORIDE 9 MG/ML
INJECTION, SOLUTION INTRAVENOUS PRN
Status: DISCONTINUED | OUTPATIENT
Start: 2025-04-22 | End: 2025-04-22 | Stop reason: HOSPADM

## 2025-04-22 RX ADMIN — PROPOFOL 40 MG: 10 INJECTION, EMULSION INTRAVENOUS at 11:49

## 2025-04-22 RX ADMIN — LIDOCAINE HYDROCHLORIDE 100 MG: 20 INJECTION, SOLUTION EPIDURAL; INFILTRATION; INTRACAUDAL; PERINEURAL at 11:36

## 2025-04-22 RX ADMIN — PROPOFOL 40 MG: 10 INJECTION, EMULSION INTRAVENOUS at 11:45

## 2025-04-22 RX ADMIN — SODIUM CHLORIDE: 9 INJECTION, SOLUTION INTRAVENOUS at 11:31

## 2025-04-22 RX ADMIN — PROPOFOL 40 MG: 10 INJECTION, EMULSION INTRAVENOUS at 11:40

## 2025-04-22 RX ADMIN — PROPOFOL 40 MG: 10 INJECTION, EMULSION INTRAVENOUS at 11:47

## 2025-04-22 RX ADMIN — PROPOFOL 50 MG: 10 INJECTION, EMULSION INTRAVENOUS at 11:38

## 2025-04-22 RX ADMIN — PROPOFOL 40 MG: 10 INJECTION, EMULSION INTRAVENOUS at 11:42

## 2025-04-22 RX ADMIN — PROPOFOL 70 MG: 10 INJECTION, EMULSION INTRAVENOUS at 11:36

## 2025-04-22 RX ADMIN — PROPOFOL 30 MG: 10 INJECTION, EMULSION INTRAVENOUS at 11:44

## 2025-04-22 ASSESSMENT — PAIN SCALES - GENERAL: PAINLEVEL_OUTOF10: 0

## 2025-04-22 ASSESSMENT — PAIN - FUNCTIONAL ASSESSMENT: PAIN_FUNCTIONAL_ASSESSMENT: 0-10

## 2025-04-22 NOTE — PROGRESS NOTES
1030: Patient took trulicity on Saturday 4/19/25. Salvador LEAL made aware. Patient okay to proceed.     Verified patient name and date of birth, scheduled procedure, and informed consent. Reviewed general discharge instructions and  information.  Assessed patient. Awake, alert, and oriented per baseline. Vital signs stable (see vital sign flowsheet). Respiratory status within defined limits, abdomen soft and non tender. Skin with in defined limits.     1055: Patient has a 50mcg and 25mcg fentanyl patch on for chronic cancer pain. Salvador LEAL informed. MD stated patient is able to keep patches on for procedure. CRNA being informed by MD.     Initial RN admission and assessment performed and documented in Endoscopy navigator.     Patient evaluated by anesthesia in pre-procedure holding.     All procedural vital signs, airway assessment, and level of consciousness information monitored and recorded by anesthesia staff on the anesthesia record.     Report received from CRNA post procedure.  Patient transported to recovery area by RN.    Endoscopy post procedure time out was performed and specimens were verified with physician.    Endoscope was pre-cleaned at bedside immediately following procedure by CHESTER.     WDL

## 2025-04-22 NOTE — H&P
[facility] [address] [address] [phone]        History and Physical     NAME:  Nyasia Rodriguez   :  1960   MRN:  [id number]         HPI:  Nyasia Rodriguez is a 64 y.o. female here for colonoscopy. Patient had negative colonoscopy 7 yrs back. Has h/o metastatic breast cancer. Recently had positive cologuard testing and referred for colonoscopy. No family h/o colon cancer.     Past Surgical History:   Procedure Laterality Date    BILATERAL OOPHORECTOMY      BREAST RECONSTRUCTION Bilateral 12/3/2024    LEFT BREAST IMPLANT REMOVAL, BILATERAL CAPSULECTOMY, BILATERAL BREAST RECONSTRUCTION WITH DEEP INFERIOR EPIGASTRIC ARTERY  FLAP (JAZMIN) performed by Fabian White MD at [facility] MAIN OR    BREAST SURGERY Bilateral     expanders taken out for MRI, with implants incerted    CERVICAL FUSION       SECTION      CYST INCISION AND DRAINAGE Right 2023    RIGHT TISSUE EXPANDER REMOVAL, capsulectomy performed by Fabian White MD at [facility] MAIN OR    ENDOMETRIAL CRYOABLATION      HEENT      Howell Teeth removal    LUMBAR FUSION  2017    MASTECTOMY, BILATERAL      ORTHOPEDIC SURGERY Right     Bone spur removal & scope of shoulder    ORTHOPEDIC SURGERY Right 2017    Torn meniscus repair     Past Medical History:   Diagnosis Date    Arthritis     b/l knees, hands,shoulders and feet    Breast cancer     Followed by Dr. NEW at [facility]    Chronic back pain     Chronic migraine without aura     Chronic pain     headaches and knees    Daytime sleepiness     Depression     Fibromyalgia     Glaucoma     right eye    Hyperlipidemia     Hypertension     Increased heart rate     Insomnia     Metastatic cancer (HCC)     Murmur     present at birth    Narcolepsy     Thromboembolus (HCC)     RLE as a child after impact injury    Type 2 diabetes mellitus without complication      Social History     Tobacco Use    Smoking status: Never    Smokeless

## 2025-04-22 NOTE — ANESTHESIA PRE PROCEDURE
Cardiovascular:    (+) hypertension:        Rhythm: regular  Rate: normal                    Neuro/Psych:   (+) neuromuscular disease:, headaches:, psychiatric history:            GI/Hepatic/Renal:             Endo/Other:    (+) DiabetesType II DM.                 Abdominal:             Vascular:          Other Findings:       Anesthesia Plan      MAC     ASA 2             Anesthetic plan and risks discussed with patient.                    Binh Franco MD   4/22/2025

## 2025-04-22 NOTE — ANESTHESIA POSTPROCEDURE EVALUATION
Department of Anesthesiology  Postprocedure Note    Patient: Nyasia Rodriguez  MRN: [id number]  YOB: 1960  Date of evaluation: 4/22/2025    Procedure Summary       Date: 04/22/25 Room / Location: [facility] ENDO 03 / [facility] ENDOSCOPY    Anesthesia Start: 1121 Anesthesia Stop: 1151    Procedure: COLONOSCOPY (Lower GI Region) Diagnosis:       Positive occult stool blood test      (Positive occult stool blood test [R19.5])    Surgeons: Ca Chakraborty MD Responsible Provider: Binh Franco MD    Anesthesia Type: MAC ASA Status: 2            Anesthesia Type: MAC    Osmel Phase I: Osmel Score: 10    Osmel Phase II:      Anesthesia Post Evaluation    Patient location during evaluation: bedside  Nausea & Vomiting: no nausea  Cardiovascular status: blood pressure returned to baseline  Respiratory status: acceptable  Hydration status: euvolemic    No notable events documented.

## 2025-04-22 NOTE — OP NOTE
[facility] [address] [id number] [phone]               Colonoscopy Operative Report      Indications:  Positive cologuard, negative colonoscopy about 7 yrs back    :  Ca Chakraborty MD    Staff: Circulator: Leny Mckeon, CAROLINE; Mary Hawkins, RN     Referring Provider: Rosa Velasco MD    Sedation:  MAC anesthesia    Procedure Details:  After informed consent was obtained with all risks and benefits of procedure explained and preoperative exam completed, the patient was taken to the endoscopy suite and placed in the left lateral decubitus position.  Upon sequential sedation as per above, a digital rectal exam was performed  And was normal.  The Olympus videocolonoscope  was inserted in the rectum and carefully advanced to the cecum, which was identified by the ileocecal valve and appendiceal orifice.  The quality of preparation was poor - fair visualization obtained with extensive lavag.  The colonoscope was slowly withdrawn with careful evaluation between folds. Retroflexion in the rectum was performed and was normal..     Findings:   Rectum: normal  Sigmoid: normal  Descending Colon: normal  Transverse Colon: normal  Ascending Colon: normal  Cecum: normal  Terminal Ileum: not intubated    Interventions:  none    Specimen Removed: * No specimens in log *    EBL:  Minimal    Complications:  None; patient tolerated the procedure well.    Impression:  -Inadequate prep - fair visualization with extensive lavage - no large polyps or masses.   -Otherwise normal colon without any polyps.     Recommendations:   -Resume normal medication(s).  -Regular diet.  -No further colorectal cancer screening considering metastatic breast cancer.   -Follow up with primary care physician.       Discharge Disposition:  Home in the company of a  when able to ambulate.    Ca Chakraborty MD  4/22/2025  11:53 AM

## 2025-04-22 NOTE — DISCHARGE INSTRUCTIONS
[facility] [address] [address]          Nyasia Rodriguez  [id number]  1960    COLON DISCHARGE INSTRUCTIONS    DISCOMFORT:  Redness at IV site- apply warm compress to area; if redness or soreness persist- contact your physician  There may be a slight amount of blood passed from the rectum  Gaseous discomfort- walking, belching will help relieve any discomfort    DIET:   Regular diet.     ACTIVITY:  You may resume your normal daily activities it is recommended that you spend the remainder of the day resting -  avoid any strenuous activity.  You may not operate a vehicle for 12 hours  You may not engage in an occupation involving machinery or appliances for rest of today  You may not drink alcoholic beverages for at least 12 hours  Avoid making any critical decisions for at least 24 hour    CALL M.D.  ANY SIGN OF:   Increasing pain, nausea, vomiting  Abdominal distension (swelling)  New increased bleeding (oral or rectal)  Fever (chills)  Pain in chest area  Bloody discharge from nose or mouth  Shortness of breath     Follow-up Instructions:   Call Dr. Ca Chakraborty for any questions or problems.   Telephone # [phone]  Biopsy results will be available in  5 to 7 days    Impression:  -Inadequate prep - fair visualization with extensive lavage - no large polyps or masses.   -Otherwise normal colon without any polyps.     Recommendations:   -Resume normal medication(s).  -Regular diet.  -No further colorectal cancer screening considering metastatic breast cancer.   -Follow up with primary care physician.         Ca Chakraborty MD

## 2025-05-29 ENCOUNTER — TELEPHONE (OUTPATIENT)
Age: 65
End: 2025-05-29

## 2025-05-29 SDOH — ECONOMIC STABILITY: FOOD INSECURITY: WITHIN THE PAST 12 MONTHS, YOU WORRIED THAT YOUR FOOD WOULD RUN OUT BEFORE YOU GOT MONEY TO BUY MORE.: NEVER TRUE

## 2025-05-29 SDOH — ECONOMIC STABILITY: FOOD INSECURITY: WITHIN THE PAST 12 MONTHS, THE FOOD YOU BOUGHT JUST DIDN'T LAST AND YOU DIDN'T HAVE MONEY TO GET MORE.: NEVER TRUE

## 2025-05-29 SDOH — ECONOMIC STABILITY: INCOME INSECURITY: IN THE LAST 12 MONTHS, WAS THERE A TIME WHEN YOU WERE NOT ABLE TO PAY THE MORTGAGE OR RENT ON TIME?: NO

## 2025-05-29 NOTE — TELEPHONE ENCOUNTER
I spoke to the pt for massage,she stated she was in a hospital since Saturday till yesterday 05/28/2025 ,made a f/u appt on 06/04/2025.

## 2025-05-29 NOTE — TELEPHONE ENCOUNTER
Pt called stated she was discharged from [facility] [address] [facility] and she was told to call her pcp  To get a prescription for a different antibiotic because the one they gave her (doxycydline 100mg) was causing her to be nauseous she has a number for the nurse station at [place]  [phone]

## 2025-05-30 ENCOUNTER — OFFICE VISIT (OUTPATIENT)
Age: 65
End: 2025-05-30
Payer: MEDICAID

## 2025-05-30 VITALS
OXYGEN SATURATION: 92 % | TEMPERATURE: 98 F | WEIGHT: 229 LBS | BODY MASS INDEX: 36.96 KG/M2 | SYSTOLIC BLOOD PRESSURE: 116 MMHG | HEART RATE: 123 BPM | DIASTOLIC BLOOD PRESSURE: 69 MMHG

## 2025-05-30 DIAGNOSIS — D50.8 OTHER IRON DEFICIENCY ANEMIA: ICD-10-CM

## 2025-05-30 DIAGNOSIS — E11.9 TYPE 2 DIABETES MELLITUS WITHOUT COMPLICATION, WITHOUT LONG-TERM CURRENT USE OF INSULIN (HCC): ICD-10-CM

## 2025-05-30 DIAGNOSIS — L03.032 PARONYCHIA OF GREAT TOE OF LEFT FOOT: ICD-10-CM

## 2025-05-30 DIAGNOSIS — I26.99 OTHER ACUTE PULMONARY EMBOLISM WITHOUT ACUTE COR PULMONALE (HCC): ICD-10-CM

## 2025-05-30 DIAGNOSIS — D61.810 PANCYTOPENIA DUE TO CHEMOTHERAPY: ICD-10-CM

## 2025-05-30 DIAGNOSIS — Z09 HOSPITAL DISCHARGE FOLLOW-UP: Primary | ICD-10-CM

## 2025-05-30 DIAGNOSIS — K60.2 ANAL FISSURE: ICD-10-CM

## 2025-05-30 DIAGNOSIS — C79.51 METASTATIC CANCER TO SPINE (HCC): ICD-10-CM

## 2025-05-30 DIAGNOSIS — J90 PLEURAL EFFUSION, LEFT: ICD-10-CM

## 2025-05-30 PROBLEM — T81.30XA WOUND DEHISCENCE: Status: RESOLVED | Noted: 2023-04-04 | Resolved: 2025-05-30

## 2025-05-30 PROBLEM — C50.919 BREAST CA (HCC): Status: RESOLVED | Noted: 2023-02-07 | Resolved: 2025-05-30

## 2025-05-30 PROBLEM — T40.2X5A OPIOID-INDUCED CONSTIPATION: Status: ACTIVE | Noted: 2025-05-30

## 2025-05-30 PROBLEM — M43.10 ACQUIRED SPONDYLOLISTHESIS: Status: RESOLVED | Noted: 2017-08-30 | Resolved: 2025-05-30

## 2025-05-30 PROBLEM — M51.26 LUMBAR HERNIATED DISC: Status: RESOLVED | Noted: 2017-07-28 | Resolved: 2025-05-30

## 2025-05-30 PROBLEM — M51.369 OTHER INTERVERTEBRAL DISC DEGENERATION, LUMBAR REGION: Status: RESOLVED | Noted: 2017-06-28 | Resolved: 2025-05-30

## 2025-05-30 PROBLEM — M54.16 RADICULOPATHY, LUMBAR REGION: Status: RESOLVED | Noted: 2017-07-28 | Resolved: 2025-05-30

## 2025-05-30 PROBLEM — M54.12 CERVICAL RADICULOPATHY: Status: RESOLVED | Noted: 2023-05-01 | Resolved: 2025-05-30

## 2025-05-30 PROBLEM — E66.812 CLASS 2 SEVERE OBESITY DUE TO EXCESS CALORIES WITH SERIOUS COMORBIDITY AND BODY MASS INDEX (BMI) OF 35.0 TO 35.9 IN ADULT (HCC): Status: ACTIVE | Noted: 2024-08-22

## 2025-05-30 PROBLEM — M51.26 DISPLACEMENT OF LUMBAR INTERVERTEBRAL DISC WITHOUT MYELOPATHY: Status: RESOLVED | Noted: 2017-08-30 | Resolved: 2025-05-30

## 2025-05-30 PROBLEM — M47.817 LUMBOSACRAL SPONDYLOSIS WITHOUT MYELOPATHY: Status: RESOLVED | Noted: 2017-08-30 | Resolved: 2025-05-30

## 2025-05-30 PROBLEM — M54.16 LUMBAR NEURITIS: Status: RESOLVED | Noted: 2017-06-28 | Resolved: 2025-05-30

## 2025-05-30 PROBLEM — H25.813 COMBINED FORMS OF AGE-RELATED CATARACT, BILATERAL: Status: ACTIVE | Noted: 2025-05-30

## 2025-05-30 PROBLEM — M48.062 SPINAL STENOSIS OF LUMBAR REGION WITH NEUROGENIC CLAUDICATION: Status: RESOLVED | Noted: 2017-09-25 | Resolved: 2025-05-30

## 2025-05-30 PROBLEM — K59.03 OPIOID-INDUCED CONSTIPATION: Status: ACTIVE | Noted: 2025-05-30

## 2025-05-30 LAB
CREAT UR-MCNC: 257 MG/DL
MICROALBUMIN UR-MCNC: 3.28 MG/DL
MICROALBUMIN/CREAT UR-RTO: 13 MG/G (ref 0–30)

## 2025-05-30 PROCEDURE — 99214 OFFICE O/P EST MOD 30 MIN: CPT | Performed by: STUDENT IN AN ORGANIZED HEALTH CARE EDUCATION/TRAINING PROGRAM

## 2025-05-30 RX ORDER — DIPHENOXYLATE HYDROCHLORIDE AND ATROPINE SULFATE 2.5; .025 MG/1; MG/1
1 TABLET ORAL 4 TIMES DAILY
COMMUNITY
Start: 2025-05-29

## 2025-05-30 RX ORDER — CLOTRIMAZOLE 10 MG/1
10 LOZENGE ORAL 4 TIMES DAILY
COMMUNITY
Start: 2025-04-21

## 2025-05-30 RX ORDER — DEXAMETHASONE 4 MG/1
4 TABLET ORAL
COMMUNITY
Start: 2025-04-18

## 2025-05-30 RX ORDER — DULAGLUTIDE 4.5 MG/.5ML
4.5 INJECTION, SOLUTION SUBCUTANEOUS
Qty: 2 ML | Refills: 5 | Status: SHIPPED | OUTPATIENT
Start: 2025-05-30

## 2025-05-30 RX ORDER — CEFDINIR 300 MG/1
300 CAPSULE ORAL 2 TIMES DAILY
Qty: 10 CAPSULE | Refills: 0 | Status: SHIPPED | OUTPATIENT
Start: 2025-05-30 | End: 2025-06-04

## 2025-05-30 RX ORDER — NYSTATIN 100000 [USP'U]/ML
100 SUSPENSION ORAL 4 TIMES DAILY
COMMUNITY
Start: 2025-05-13

## 2025-05-30 NOTE — PROGRESS NOTES
Post-Discharge Transitional Care Management Progress Note      Nyasia Rodriguez   YOB: 1960    Date of Office Visit:  5/30/2025  Date of Hospital Admission: 5/24/25  Date of Hospital Discharge: 5/28/25    Care management risk score Rising risk (score 2-5) and Complex Care (Scores >=6): No Risk Score On File     Non face to face  following discharge, date last encounter closed (first attempt may have been earlier): *No documented post hospital discharge outreach found in the last 14 days *No documented post hospital discharge outreach found in the last 14 days    Call initiated 2 business days of discharge: *No response recorded in the last 14 days    ASSESSMENT/PLAN:   Hospital discharge follow-up  -     [place] DISCHARGE MEDS RECONCILED W/ CURRENT OUTPATIENT MED LIST  Pleural effusion, left  -     cefdinir (OMNICEF) 300 MG capsule; Take 1 capsule by mouth 2 times daily for 5 days, Disp-10 capsule, R-0Normal  Metastatic cancer to spine (HCC)  Pancytopenia due to chemotherapy  Other iron deficiency anemia  Other acute pulmonary embolism without acute cor pulmonale (HCC)  Type 2 diabetes mellitus without complication, without long-term current use of insulin (HCC)  -     TRULICITY 4.5 MG/0.5ML SOAJ; Inject 4.5 mg into the skin every 7 days, Disp-2 mL, R-5, DAWNormal  -     Albumin/Creatinine Ratio, Urine; Future  Paronychia of great toe of left foot  -     AFL - Gail Booth DPM, PodiatryTrae ([facility])  Anal fissure  -     AFL - Binh Escobar MD, Colorectal SurgeryTrae ([facility])    Patient presents today for hospital discharge follow-up  Patient was seen today for a transitional care visit.  -Patient/caregiver was contacted within 2 business days following discharge by her care coordinator  -Follow-up visit took place within 7 days of discharge  -All medications were reviewed and reconciled.  Patient has all necessary medications or such medications were refilled.  -I independently

## 2025-05-30 NOTE — PROGRESS NOTES
RM 14    Chief Complaint   Patient presents with    Follow-up     She stated she was in a hospital for shortness of a breath and she diagnosed with pleural effusion ,7 days ago .       Vitals:    05/30/25 0917   BP: 116/69   BP Site: Left Upper Arm   Patient Position: Sitting   Pulse: (!) 123   Temp: 98 °F (36.7 °C)   TempSrc: Oral   SpO2: 92%   Weight: 103.9 kg (229 lb)        \"Have you been to the ER, urgent care clinic since your last visit?  Hospitalized since your last visit?\"    NO    “Have you seen or consulted any other health care providers outside of [facility] since your last visit?”    NO            Click Here for Release of Records Request   AVS  education, follow up, and recommendations provided and addressed with patient.  services used to advise patient No

## 2025-06-01 ENCOUNTER — RESULTS FOLLOW-UP (OUTPATIENT)
Age: 65
End: 2025-06-01

## 2025-07-30 ENCOUNTER — OFFICE VISIT (OUTPATIENT)
Age: 65
End: 2025-07-30
Payer: MEDICAID

## 2025-07-30 VITALS
RESPIRATION RATE: 12 BRPM | HEART RATE: 119 BPM | DIASTOLIC BLOOD PRESSURE: 67 MMHG | WEIGHT: 212 LBS | OXYGEN SATURATION: 95 % | SYSTOLIC BLOOD PRESSURE: 104 MMHG | BODY MASS INDEX: 34.07 KG/M2 | HEIGHT: 66 IN | TEMPERATURE: 99.4 F

## 2025-07-30 DIAGNOSIS — E66.09 CLASS 1 OBESITY DUE TO EXCESS CALORIES WITH SERIOUS COMORBIDITY AND BODY MASS INDEX (BMI) OF 34.0 TO 34.9 IN ADULT: ICD-10-CM

## 2025-07-30 DIAGNOSIS — C79.51 METASTATIC CANCER TO SPINE (HCC): Primary | ICD-10-CM

## 2025-07-30 DIAGNOSIS — E55.9 VITAMIN D DEFICIENCY: ICD-10-CM

## 2025-07-30 DIAGNOSIS — D61.810 PANCYTOPENIA DUE TO CHEMOTHERAPY: ICD-10-CM

## 2025-07-30 DIAGNOSIS — E11.9 TYPE 2 DIABETES MELLITUS WITHOUT COMPLICATION, WITHOUT LONG-TERM CURRENT USE OF INSULIN (HCC): ICD-10-CM

## 2025-07-30 DIAGNOSIS — E66.811 CLASS 1 OBESITY DUE TO EXCESS CALORIES WITH SERIOUS COMORBIDITY AND BODY MASS INDEX (BMI) OF 34.0 TO 34.9 IN ADULT: ICD-10-CM

## 2025-07-30 DIAGNOSIS — R29.6 FREQUENT FALLS: ICD-10-CM

## 2025-07-30 DIAGNOSIS — E78.00 ELEVATED CHOLESTEROL: ICD-10-CM

## 2025-07-30 DIAGNOSIS — R29.898 BILATERAL LEG WEAKNESS: ICD-10-CM

## 2025-07-30 PROBLEM — Z82.49 FAMILY HISTORY OF CARDIAC DISORDER: Status: ACTIVE | Noted: 2025-07-30

## 2025-07-30 PROBLEM — I26.99 PULMONARY EMBOLISM (HCC): Status: RESOLVED | Noted: 2025-07-30 | Resolved: 2025-07-30

## 2025-07-30 PROBLEM — J90 PLEURAL EFFUSION: Status: RESOLVED | Noted: 2025-07-30 | Resolved: 2025-07-30

## 2025-07-30 PROCEDURE — 3074F SYST BP LT 130 MM HG: CPT | Performed by: STUDENT IN AN ORGANIZED HEALTH CARE EDUCATION/TRAINING PROGRAM

## 2025-07-30 PROCEDURE — 3078F DIAST BP <80 MM HG: CPT | Performed by: STUDENT IN AN ORGANIZED HEALTH CARE EDUCATION/TRAINING PROGRAM

## 2025-07-30 PROCEDURE — 99214 OFFICE O/P EST MOD 30 MIN: CPT | Performed by: STUDENT IN AN ORGANIZED HEALTH CARE EDUCATION/TRAINING PROGRAM

## 2025-07-30 NOTE — PROGRESS NOTES
Nyasia Rodriguez is a 65 y.o. female presenting for Diabetes, Hypertension, Follow-up, and Fall (Legs gave out twice yesterday )      /67   Pulse (!) 119   Temp 99.4 °F (37.4 °C) (Oral)   Resp 12   Ht 1.676 m (5' 6\")   Wt 96.2 kg (212 lb)   SpO2 95%   BMI 34.22 kg/m²       Current Outpatient Medications   Medication Sig Dispense Refill    clotrimazole (MYCELEX) 10 MG paxton Take 1 tablet by mouth 4 times daily (Patient not taking: Reported on 7/30/2025)      dexAMETHasone (DECADRON) 4 MG tablet Take 1 tablet by mouth daily (with breakfast) After chemo      diphenoxylate-atropine (LOMOTIL) 2.5-0.025 MG per tablet Take 1 tablet by mouth as needed.      nystatin (MYCOSTATIN) 470737 UNIT/ML suspension Take 100 Units by mouth 4 times daily      apixaban (ELIQUIS) 5 MG TABS tablet Take by mouth 2 times daily      TRULICITY 4.5 MG/0.5ML SOAJ Inject 4.5 mg into the skin every 7 days 2 mL 5    fentaNYL (DURAGESIC) 25 MCG/HR Place 1 patch onto the skin every 72 hours.      fentaNYL (DURAGESIC) 50 MCG/HR Place 1 patch onto the skin every 72 hours. (Patient not taking: Reported on 7/30/2025)      HYDROmorphone (DILAUDID) 8 MG tablet Take 1 tablet by mouth every 3 hours as needed for Pain.      rosuvastatin (CRESTOR) 10 MG tablet Take 1 tablet by mouth nightly 90 tablet 3    amLODIPine (NORVASC) 5 MG tablet Take 1 tablet by mouth every morning 90 tablet 3    OLANZapine (ZYPREXA) 5 MG tablet Take 1 tablet by mouth nightly at bedtime.      Denosumab (XGEVA SC) Inject into the skin every 30 days      Simethicone (GAS-X PO) Take by mouth 2 times daily as needed      vitamin D (VITAMIN D, CHOLECALCIFEROL,) 25 MCG (1000 UT) CAPS Take by mouth in the morning and at bedtime      CALCIUM PO Take 650 mg by mouth in the morning and at bedtime (Patient not taking: Reported on 7/30/2025)      MAGNESIUM GLYCINATE PO Take 3 capsules by mouth every evening (Patient not taking: Reported on 7/30/2025)      valACYclovir (VALTREX) 500

## 2025-07-30 NOTE — PROGRESS NOTES
Nyasia Rodriguez (:  1960) is a 65 y.o. female,Established patient, here for evaluation of the following chief complaint(s):  Diabetes, Hypertension, Follow-up, and Fall (Legs gave out twice yesterday )      Assessment & Plan   ASSESSMENT/PLAN:  Assessment & Plan  1. Metastatic spine cancer: Chronic, stable. Reviewed 2025 labs.  - Under hematologist care, receiving treatment.  - Follow up as needed.    2. Pancytopenia due to chemotherapy: Likely secondary to ongoing chemotherapy. Reviewed 2025 labs.  - Consulting Hematology Oncology, receiving transfusions.  - Follow up as needed.    3. Type 2 diabetes: Chronic, stable.  - Responding well to Trulicity.  - Maintain current regimen.  - A1c test today.    4. Elevated cholesterol: Chronic, stable.  - Continue statin.  - Lipid panel today.    5. Obesity: Chronic, improving. Weight decreased, BMI 34.22.  - Improvement may be partially due to cancer treatment.    6. Bilateral leg weakness: Chronic, worsening. Protein levels were low at 5.6 on 2025.  - Suspected deconditioning.  - Home health physical therapy appointment.  - Increase protein intake due to low levels.    7. Frequent falls: Likely secondary to weakness, deconditioning.  - Home health physical therapy recommended.    8. Vitamin D deficiency: Chronic, stable.  - Taking supplements.  - Continue supplementation.  - Recheck levels today.    Follow-up: Check in a week about air compressor and therapy. Contact for assistance.    1. Metastatic cancer to spine (HCC)  2. Pancytopenia due to chemotherapy  3. Type 2 diabetes mellitus without complication, without long-term current use of insulin (HCC)  -     Hemoglobin A1C; Future  4. Elevated cholesterol  -     Lipid Panel; Future  5. Class 1 obesity due to excess calories with serious comorbidity and body mass index (BMI) of 34.0 to 34.9 in adult  6. Bilateral leg weakness  7. Frequent falls  8. Vitamin D deficiency  -     Vitamin D 25

## 2025-07-31 LAB
25(OH)D3 SERPL-MCNC: 17.4 NG/ML (ref 30–100)
CHOLEST SERPL-MCNC: 140 MG/DL (ref 0–200)
COMMENT:: NORMAL
EST. AVERAGE GLUCOSE BLD GHB EST-MCNC: 149 MG/DL
HBA1C MFR BLD: 6.8 % (ref 4–5.6)
HDLC SERPL-MCNC: 66 MG/DL (ref 40–60)
HDLC SERPL: 2.1
LDLC SERPL CALC-MCNC: 43 MG/DL
SPECIMEN HOLD: NORMAL
TRIGL SERPL-MCNC: 156 MG/DL (ref 0–150)
VLDLC SERPL CALC-MCNC: 31 MG/DL

## (undated) LAB
ALBUMIN SERPL-MCNC: 3 G/DL (ref 3.5–5)
ALBUMIN/GLOB SERPL: 0.9 (ref 1.1–2.2)
ALP SERPL-CCNC: 97 U/L (ref 45–117)
ALT SERPL-CCNC: 18 U/L (ref 12–78)
ANION GAP SERPL CALC-SCNC: 3 MMOL/L (ref 5–15)
AST SERPL-CCNC: 14 U/L (ref 15–37)
BASOPHILS # BLD: 0 K/UL (ref 0–0.1)
BASOPHILS NFR BLD: 0 % (ref 0–1)
BILIRUB SERPL-MCNC: 0.4 MG/DL (ref 0.2–1)
BUN SERPL-MCNC: 12 MG/DL (ref 6–20)
BUN/CREAT SERPL: 16 (ref 12–20)
CALCIUM SERPL-MCNC: 9 MG/DL (ref 8.5–10.1)
CHLORIDE SERPL-SCNC: 109 MMOL/L (ref 97–108)
CO2 SERPL-SCNC: 28 MMOL/L (ref 21–32)
CREAT SERPL-MCNC: 0.75 MG/DL (ref 0.55–1.02)
DIFFERENTIAL METHOD BLD: (no result)
EOSINOPHIL # BLD: 0 K/UL (ref 0–0.4)
EOSINOPHIL NFR BLD: 0 % (ref 0–7)
ERYTHROCYTE [DISTWIDTH] IN BLOOD BY AUTOMATED COUNT: 18.6 % (ref 11.5–14.5)
GLOBULIN SER CALC-MCNC: 3.2 G/DL (ref 2–4)
GLUCOSE BLD STRIP.AUTO-MCNC: 125 MG/DL (ref 65–117)
GLUCOSE BLD STRIP.AUTO-MCNC: 88 MG/DL (ref 65–117)
GLUCOSE BLD STRIP.AUTO-MCNC: 94 MG/DL (ref 65–117)
GLUCOSE SERPL-MCNC: 141 MG/DL (ref 65–100)
HCT VFR BLD AUTO: 33.3 % (ref 35–47)
HGB BLD-MCNC: 10.3 G/DL (ref 11.5–16)
IMM GRANULOCYTES # BLD AUTO: 0 K/UL (ref 0–0.04)
IMM GRANULOCYTES NFR BLD AUTO: 0 % (ref 0–0.5)
LYMPHOCYTES # BLD: 1.2 K/UL (ref 0.8–3.5)
LYMPHOCYTES NFR BLD: 13 % (ref 12–49)
MCH RBC QN AUTO: 27.2 PG (ref 26–34)
MCHC RBC AUTO-ENTMCNC: 30.9 G/DL (ref 30–36.5)
MCV RBC AUTO: 87.9 FL (ref 80–99)
MONOCYTES # BLD: 0.6 K/UL (ref 0–1)
MONOCYTES NFR BLD: 7 % (ref 5–13)
NEUTS SEG # BLD: 7.4 K/UL (ref 1.8–8)
NEUTS SEG NFR BLD: 80 % (ref 32–75)
NRBC # BLD: 0.03 K/UL (ref 0–0.01)
NRBC BLD-RTO: 0.3 PER 100 WBC
PLATELET # BLD AUTO: 178 K/UL (ref 150–400)
PMV BLD AUTO: 10.2 FL (ref 8.9–12.9)
POTASSIUM SERPL-SCNC: 3.7 MMOL/L (ref 3.5–5.1)
PROT SERPL-MCNC: 6.2 G/DL (ref 6.4–8.2)
RBC # BLD AUTO: 3.79 M/UL (ref 3.8–5.2)
SERVICE CMNT-IMP: (no result)
SODIUM SERPL-SCNC: 140 MMOL/L (ref 136–145)
VANCOMYCIN SERPL-MCNC: 11.4 UG/ML
WBC # BLD AUTO: 9.3 K/UL (ref 3.6–11)

## (undated) DEVICE — APPLIER CLP L9.375IN APER 2.1MM CLS L3.8MM 20 SM TI CLP

## (undated) DEVICE — SOLUTION IRRIG 1000ML H2O STRL BLT

## (undated) DEVICE — BLANKET WRM W35.4XL86.6IN FULL UNDERBODY + FORC AIR

## (undated) DEVICE — 3M™ BAIR HUGGER® UNDERBODY BLANKET, SPINAL, 10 PER CASE 57501: Brand: BAIR HUGGER™

## (undated) DEVICE — COVER,TABLE,60X90,STERILE: Brand: MEDLINE

## (undated) DEVICE — SUTURE MONOCRYL + SZ 4-0 L27IN ABSRB UD L19MM PS-2 3/8 CIR MCP426H

## (undated) DEVICE — BONE MARROW KIT ASPIR 11 GA

## (undated) DEVICE — STRIP,CLOSURE,WOUND,MEDI-STRIP,1/2X4: Brand: MEDLINE

## (undated) DEVICE — TUBING, SUCTION, 1/4" X 10', STRAIGHT: Brand: MEDLINE

## (undated) DEVICE — WIPE 400300 MEROCEL 20PK INSTRUMENT: Brand: MEROCEL®

## (undated) DEVICE — ELECTRODE PT RET AD L9FT HI MOIST COND ADH HYDRGEL CORDED

## (undated) DEVICE — SUTURE STRATAFIX SZ 3-0 30CM NONABSORB UD 26MM FS 3/8 SXMP2B412

## (undated) DEVICE — TRAY CATH 16F DRN BG LTX -- CONVERT TO ITEM 363158

## (undated) DEVICE — BIPOLAR FORCEPS CORD,BANANA LEADS: Brand: VALLEYLAB

## (undated) DEVICE — KENDALL SCD EXPRESS SLEEVES, KNEE LENGTH, MEDIUM: Brand: KENDALL SCD

## (undated) DEVICE — SYRINGE FLSH IV STD 10 CC W/O CANN PREFILLED NACL POSIFLUSH 306546

## (undated) DEVICE — SUTURE STRATAFIX SYMMETRIC SZ 1 L18IN ABSRB VLT CT1 L36CM SXPP1A404

## (undated) DEVICE — COVER,MAYO STAND,STERILE: Brand: MEDLINE

## (undated) DEVICE — NEEDLE HYPO 18GA L1.5IN PNK S STL HUB POLYPR SHLD REG BVL

## (undated) DEVICE — SUTURE NONABSORBABLE MONOFILAMENT 2-0 FS 18 IN ETHILON 664H

## (undated) DEVICE — KIT JACK TBL PT CARE

## (undated) DEVICE — Device

## (undated) DEVICE — HYPODERMIC SAFETY NEEDLE: Brand: MAGELLAN

## (undated) DEVICE — NDL SPNE QNCKE 18GX3.5IN LF --

## (undated) DEVICE — GOWN,SIRUS,NONRNF,SETINSLV,2XL,18/CS: Brand: MEDLINE

## (undated) DEVICE — BLADE ES ELASTOMERIC COAT INSUL DURABLE BEND UPTO 90DEG

## (undated) DEVICE — SUTURE VICRYL + SZ 2-0 L27IN ABSRB CLR CT-1 1/2 CIR TAPERCUT VCP259H

## (undated) DEVICE — 3M™ STERI-DRAPE™ INSTRUMENT POUCH 1018: Brand: STERI-DRAPE™

## (undated) DEVICE — Z CONVERTED USE 2107985 COVER FLROSCP W36XL28IN 4 SIDE ADH

## (undated) DEVICE — SUTURE MONOCRYL SZ 3-0 L27IN ABSRB UD PS-2 3/8 CIR REV CUT NDL MCP427H

## (undated) DEVICE — (D)PREP SKN CHLRAPRP APPL 26ML -- CONVERT TO ITEM 371833

## (undated) DEVICE — APPLICATOR MEDICATED 26 CC SOLUTION HI LT ORNG CHLORAPREP

## (undated) DEVICE — CULTURETTE 2 SWB AMIES GEL --

## (undated) DEVICE — SUTURE VCRL SZ 1 L18IN ABSRB VLT CT-1 L36MM 1/2 CIR J741D

## (undated) DEVICE — PLASTICS -SFMC: Brand: MEDLINE INDUSTRIES, INC.

## (undated) DEVICE — DECANTER BAG 9": Brand: MEDLINE INDUSTRIES, INC.

## (undated) DEVICE — SYRINGE MED 10ML LUERLOCK TIP W/O SFTY DISP

## (undated) DEVICE — STERILE POLYISOPRENE POWDER-FREE SURGICAL GLOVES WITH EMOLLIENT COATING: Brand: PROTEXIS

## (undated) DEVICE — LAMINECTOMY RICHMOND-LF: Brand: MEDLINE INDUSTRIES, INC.

## (undated) DEVICE — INFECTION CONTROL KIT SYS

## (undated) DEVICE — 3000CC GUARDIAN II: Brand: GUARDIAN

## (undated) DEVICE — 3M™ TEGADERM™ TRANSPARENT FILM DRESSING FRAME STYLE, 1626W, 4 IN X 4-3/4 IN (10 CM X 12 CM), 50/CT 4CT/CASE: Brand: 3M™ TEGADERM™

## (undated) DEVICE — GAUZE SPONGES,12 PLY: Brand: CURITY

## (undated) DEVICE — APPLIER LIG CLP M L11IN TI STR RNG HNDL FOR 20 CLP DISP

## (undated) DEVICE — SOLUTION IRRIG 1000ML STRL H2O USP PLAS POUR BTL

## (undated) DEVICE — SYR 50ML LR LCK 1ML GRAD NSAF --

## (undated) DEVICE — SYSTEM SKIN CLSR 22CM DERMBND PRINEO

## (undated) DEVICE — STAPLER SKIN 35CT WD STRL DISP -- MULTIFIRE PREMIUM

## (undated) DEVICE — BLADE ES L4IN INSUL EDGE

## (undated) DEVICE — PAD,ABDOMINAL,5"X9",ST,LF,25/BX: Brand: MEDLINE INDUSTRIES, INC.

## (undated) DEVICE — BRA SURG POST-OP XL 46IN --

## (undated) DEVICE — FRAZIER SUCTION INSTRUMENT 7 FR W/CONTROL VENT & OBTURATOR: Brand: FRAZIER

## (undated) DEVICE — TOOL 14MH30 LEGEND 14CM 3MM: Brand: MIDAS REX ™

## (undated) DEVICE — CORD ELECSURG BPLR 12 FT DISP 810T818750] ADLER INSTRUMENT CO]

## (undated) DEVICE — STERILE POLYISOPRENE POWDER-FREE SURGICAL GLOVES: Brand: PROTEXIS

## (undated) DEVICE — BINDER ABD UNISX 4 PNL PREM 6INX6INX12IN L XL 4

## (undated) DEVICE — HOOK RETRACT STERIL 12MM S STL BLNT E STAY LONE STAR

## (undated) DEVICE — DRAPE MICSCP W46XL120IN FOR ZEISS MD FEATURING CLEARLENS

## (undated) DEVICE — RESERVOIR,SUCTION,100CC,SILICONE: Brand: MEDLINE

## (undated) DEVICE — SUTURE ETHILON SZ 9-0 L5IN NONABSORBABLE BLK BV130-5 L19MM 2809G

## (undated) DEVICE — SPONGE LAP W18XL18IN WHT COT 4 PLY FLD STRUNG RADPQ DISP ST 2 PER PACK

## (undated) DEVICE — SUTURE MCRYL SZ 3-0 L27IN ABSRB UD PS-2 3/8 CIR REV CUT NDL MCP427H

## (undated) DEVICE — SUTURE VICRYL + SZ 3-0 L18IN ABSRB UD SH 1/2 CIR TAPERCUT NDL VCP864D

## (undated) DEVICE — SOLUTION IRRIG 1000ML 0.9% SOD CHL USP POUR PLAS BTL

## (undated) DEVICE — 1LYRTR 16FR10ML100%SILTMPS SNP: Brand: MEDLINE INDUSTRIES, INC.

## (undated) DEVICE — SUTURE VCRL SZ 2-0 L27IN ABSRB UD L26MM SH 1/2 CIR J417H

## (undated) DEVICE — SPONGE,NEURO,.75"X.75",XR,STRL,LF,10/PK: Brand: MEDLINE

## (undated) DEVICE — MEDI-VAC NON-CONDUCTIVE SUCTION TUBING: Brand: CARDINAL HEALTH

## (undated) DEVICE — MATERIAL BKGRND W25XL50MM 1MM GRID LN BLU SIL RADPQ MERCIAN

## (undated) DEVICE — DRAPE FLD WRM W44XL66IN C6L FOR INTRATEMP SYS THERMABASIN

## (undated) DEVICE — SURGIFOAM SPNG SZ 100

## (undated) DEVICE — CANISTER, RIGID, 3000CC: Brand: MEDLINE INDUSTRIES, INC.

## (undated) DEVICE — DRAIN SURG 15FR SIL RND CHN W/ TRCR FULL FLUT DBL WRP TRAD

## (undated) DEVICE — SUPPLEMENT DIGESTIVE H2O SOL GI-EASE

## (undated) DEVICE — GLOVE SURG SZ 6 L12IN FNGR THK79MIL GRN LTX FREE

## (undated) DEVICE — COVER LT HNDL PLAS RIG 1 PER PK

## (undated) DEVICE — GEL US 20GM NONIRRITATING OVERWRAPPED FILE PCH TRNSMIT

## (undated) DEVICE — SUTURE MONOCRYL + ABSORBABLE MONOFILAMENT 2-0 CT1 12 IN UD SXMP1B412

## (undated) DEVICE — CLAMP MICROVASCULAR M CLMPING FORC 30GM/SQMM VEN SGL

## (undated) DEVICE — SUTURE VICRYL + SZ 2-0 L36IN ABSRB UD L36MM CT-1 1/2 CIR VCP945H

## (undated) DEVICE — HANDLE LT SNAP ON ULT DURABLE LENS FOR TRUMPF ALC DISPOSABLE

## (undated) DEVICE — DRAPE,LAPAROTOMY,PCH,STERILE: Brand: MEDLINE

## (undated) DEVICE — YANKAUER,TAPERED BULBOUS TIP,W/O VENT: Brand: MEDLINE

## (undated) DEVICE — BONE WAX WHITE: Brand: BONE WAX WHITE

## (undated) DEVICE — MICROVASCULAR CLAMPS ARE USED FOR END-TO-END ANASTOMOTIC PROCEDURES FOR ARTERIES AND VEINS: Brand: GEM BIOVER MICROVASCULAR CLAMP

## (undated) DEVICE — SPONGE GZ W4XL4IN COT RADPQ HIGHLY ABSRB STERILE

## (undated) DEVICE — SUTURE V-LOC 180 SZ 0 L12IN ABSRB GRN L37MM GS-21 1/2 CIR VLOCL0316

## (undated) DEVICE — SOLUTION IRRIG 3000ML 0.9% SOD CHL FLX CONT 0797208] ICU MEDICAL INC]

## (undated) DEVICE — SUTURE STRATAFIX SPRL MCRYL + SZ 2-0 L18IN ABSRB UD CT-1 SXMP1B413

## (undated) DEVICE — COVER US PRB W15XL120CM W/ GEL RUBBERBAND TAPE STRP FLD GEN

## (undated) DEVICE — SOLUTION IV 1000ML 0.9% SOD CHL

## (undated) DEVICE — STAPLER SKIN H3.9MM WIRE DIA0.58MM CRWN 6.9MM 35 STPL ROT

## (undated) DEVICE — CHEST PACK-SFMCASU: Brand: MEDLINE INDUSTRIES, INC.

## (undated) DEVICE — GLOVE SURG SZ 6 THK91MIL LTX FREE SYN POLYISOPRENE ANTI

## (undated) DEVICE — DRAIN SURG 19FR 0.25IN SIL RND W/ TRCR INDIC DOT RADPQ FULL

## (undated) DEVICE — TUBING SUCT 10FR MAL ALUM SHFT FN CAP VENT UNIV CONN W/ OBT

## (undated) DEVICE — TOWEL,OR,DSP,ST,BLUE,STD,4/PK,20PK/CS: Brand: MEDLINE

## (undated) DEVICE — SOLUTION IRRIG 500ML 0.9% SOD CHLO USP POUR PLAS BTL

## (undated) DEVICE — BLADE CLIPPER GEN PURP NS

## (undated) DEVICE — TOWEL SURG W17XL27IN STD BLU COT NONFENESTRATED PREWASHED

## (undated) DEVICE — TUBING IRRIG L77IN DIA0.241IN L BOR FOR CYSTO W/ NVENT

## (undated) DEVICE — PACK,ORTOHMAX/CVMAX,UNIVERSAL,5/CS: Brand: MEDLINE